# Patient Record
Sex: MALE | Race: WHITE | Employment: OTHER | URBAN - METROPOLITAN AREA
[De-identification: names, ages, dates, MRNs, and addresses within clinical notes are randomized per-mention and may not be internally consistent; named-entity substitution may affect disease eponyms.]

---

## 2017-04-04 ENCOUNTER — TELEPHONE (OUTPATIENT)
Dept: FAMILY MEDICINE CLINIC | Facility: CLINIC | Age: 67
End: 2017-04-04

## 2017-04-04 DIAGNOSIS — E29.1 MALE HYPOGONADISM: ICD-10-CM

## 2017-04-04 DIAGNOSIS — E87.6 HYPOKALEMIA: Primary | ICD-10-CM

## 2017-04-04 NOTE — TELEPHONE ENCOUNTER
Pt would like for Dr. Dorita Caban to placed required labs into the system. Pt must have labs done before his hormone levels can be checked. Pt would like to have these labs done before Friday please. Would like RN call back to confirm orders are ready.

## 2017-04-05 NOTE — TELEPHONE ENCOUNTER
JWVANI can you advise on message below? Pt wants to get labs drawn to check his hormone levels. Pt is not scheduled for an up coming appointment. LOV 09/08/2016. Would you like pt to follow up with you before placing labs?

## 2017-04-25 ENCOUNTER — APPOINTMENT (OUTPATIENT)
Dept: LAB | Age: 67
End: 2017-04-25
Attending: FAMILY MEDICINE
Payer: COMMERCIAL

## 2017-04-25 DIAGNOSIS — E29.1 MALE HYPOGONADISM: ICD-10-CM

## 2017-04-25 DIAGNOSIS — E87.6 HYPOKALEMIA: ICD-10-CM

## 2017-04-25 PROCEDURE — 80048 BASIC METABOLIC PNL TOTAL CA: CPT

## 2017-04-25 PROCEDURE — 36415 COLL VENOUS BLD VENIPUNCTURE: CPT

## 2017-04-25 PROCEDURE — 84403 ASSAY OF TOTAL TESTOSTERONE: CPT

## 2017-04-25 PROCEDURE — 84402 ASSAY OF FREE TESTOSTERONE: CPT

## 2017-04-27 ENCOUNTER — TELEPHONE (OUTPATIENT)
Dept: FAMILY MEDICINE CLINIC | Facility: CLINIC | Age: 67
End: 2017-04-27

## 2017-04-27 DIAGNOSIS — E87.6 HYPOKALEMIA: ICD-10-CM

## 2017-04-27 DIAGNOSIS — R73.9 HYPERGLYCEMIA: Primary | ICD-10-CM

## 2017-04-27 RX ORDER — POTASSIUM CHLORIDE 1500 MG/1
20 TABLET, FILM COATED, EXTENDED RELEASE ORAL DAILY
Qty: 30 TABLET | Refills: 5 | Status: SHIPPED | OUTPATIENT
Start: 2017-04-27 | End: 2019-01-26

## 2017-04-28 NOTE — TELEPHONE ENCOUNTER
Spoke with pt informed test results states he will  K-Dur. Made appt for annual physical on 05/09/17. BMP and HgbA1c tests ordered to be completed in one month. Pt verbalized understanding.

## 2017-04-28 NOTE — TELEPHONE ENCOUNTER
----- Message from Gregg Bates MD sent at 4/27/2017  9:18 AM CDT -----  Labs: potassium remains low at 3.1, should be 3.3 or higher and had been 3.2 8 mo ago.  He needs to be on a potassium supplement and I will send script to his pharmacy for K-Dur 2

## 2017-04-28 NOTE — TELEPHONE ENCOUNTER
[facility] [phone] today, then [phone] afterward. Thank you.

## 2017-05-09 ENCOUNTER — OFFICE VISIT (OUTPATIENT)
Dept: FAMILY MEDICINE CLINIC | Facility: CLINIC | Age: 67
End: 2017-05-09

## 2017-05-09 VITALS
TEMPERATURE: 99 F | DIASTOLIC BLOOD PRESSURE: 90 MMHG | HEIGHT: 66.5 IN | WEIGHT: 256 LBS | SYSTOLIC BLOOD PRESSURE: 154 MMHG | RESPIRATION RATE: 16 BRPM | HEART RATE: 88 BPM | BODY MASS INDEX: 40.66 KG/M2

## 2017-05-09 DIAGNOSIS — E78.00 PURE HYPERCHOLESTEROLEMIA: ICD-10-CM

## 2017-05-09 DIAGNOSIS — Z12.11 COLON CANCER SCREENING: ICD-10-CM

## 2017-05-09 DIAGNOSIS — I87.2 VENOUS INSUFFICIENCY (CHRONIC) (PERIPHERAL): ICD-10-CM

## 2017-05-09 DIAGNOSIS — I10 HTN (HYPERTENSION), BENIGN: ICD-10-CM

## 2017-05-09 DIAGNOSIS — K21.9 GASTROESOPHAGEAL REFLUX DISEASE, ESOPHAGITIS PRESENCE NOT SPECIFIED: ICD-10-CM

## 2017-05-09 DIAGNOSIS — H91.92 HEARING LOSS OF LEFT EAR, UNSPECIFIED HEARING LOSS TYPE: ICD-10-CM

## 2017-05-09 DIAGNOSIS — Z86.010 HISTORY OF COLON POLYPS: ICD-10-CM

## 2017-05-09 DIAGNOSIS — E29.1 MALE HYPOGONADISM: ICD-10-CM

## 2017-05-09 DIAGNOSIS — E66.9 OBESITY (BMI 35.0-39.9 WITHOUT COMORBIDITY): ICD-10-CM

## 2017-05-09 DIAGNOSIS — Z00.00 ANNUAL PHYSICAL EXAM: Primary | ICD-10-CM

## 2017-05-09 DIAGNOSIS — E87.6 HYPOKALEMIA: ICD-10-CM

## 2017-05-09 DIAGNOSIS — I10 ESSENTIAL HYPERTENSION: ICD-10-CM

## 2017-05-09 PROBLEM — Z86.0100 HISTORY OF COLON POLYPS: Status: ACTIVE | Noted: 2017-05-09

## 2017-05-09 PROCEDURE — 99397 PER PM REEVAL EST PAT 65+ YR: CPT | Performed by: FAMILY MEDICINE

## 2017-05-09 RX ORDER — AMLODIPINE BESYLATE 5 MG/1
TABLET ORAL
Qty: 180 TABLET | Refills: 3 | Status: SHIPPED | OUTPATIENT
Start: 2017-05-09 | End: 2018-02-20

## 2017-05-09 RX ORDER — TESTOSTERONE CYPIONATE 200 MG/ML
200 INJECTION INTRAMUSCULAR
Qty: 10 ML | Refills: 3 | Status: SHIPPED
Start: 2017-05-09 | End: 2017-07-25

## 2017-05-09 RX ORDER — HYDROCODONE BITARTRATE AND ACETAMINOPHEN 5; 325 MG/1; MG/1
1 TABLET ORAL EVERY 6 HOURS PRN
Qty: 40 TABLET | Refills: 0 | Status: SHIPPED | OUTPATIENT
Start: 2017-05-09 | End: 2017-11-21

## 2017-05-09 RX ORDER — VALSARTAN AND HYDROCHLOROTHIAZIDE 320; 25 MG/1; MG/1
1 TABLET, FILM COATED ORAL DAILY
Qty: 90 TABLET | Refills: 3 | Status: SHIPPED | OUTPATIENT
Start: 2017-05-09 | End: 2018-02-20

## 2017-05-09 RX ORDER — DIAZEPAM 5 MG/1
TABLET ORAL
Qty: 30 TABLET | Refills: 0 | Status: SHIPPED
Start: 2017-05-09 | End: 2018-02-20

## 2017-05-09 RX ORDER — METOPROLOL SUCCINATE 50 MG/1
50 TABLET, EXTENDED RELEASE ORAL
Qty: 90 TABLET | Refills: 3 | Status: SHIPPED | OUTPATIENT
Start: 2017-05-09 | End: 2018-02-20

## 2017-05-09 RX ORDER — OMEPRAZOLE 40 MG/1
40 CAPSULE, DELAYED RELEASE ORAL DAILY
Qty: 90 CAPSULE | Refills: 3 | Status: SHIPPED | OUTPATIENT
Start: 2017-05-09 | End: 2018-02-20

## 2017-05-09 RX ORDER — SIMVASTATIN 40 MG
TABLET ORAL
Qty: 90 TABLET | Refills: 3 | Status: SHIPPED | OUTPATIENT
Start: 2017-05-09 | End: 2018-02-20

## 2017-05-09 NOTE — PROGRESS NOTES
HPI:    Patient ID: Eward Ahumada is a 75year old male.   Patient presents with:  Physical  Medication Request  Patient presents with:  Physical  Medication Request    HPI  Hx allergic rhinitis, HLD, HTN, obesity, male hypogonadism, GERD, elevated BS, joe for next colonoscopy   Genitourinary: Positive for frequency and nocturia. Hx male hypogonadism, would like to resume testosterone injections  Nocturia x 1. Allergic/Immunologic: Positive for environmental allergies.    Neurological: Positive for t Rfl:    Calcium & Magnesium Carbonates 311-232 MG Oral Tab Take  by mouth. calcium 500 mg tablet Disp:  Rfl:    testosterone cypionate (DEPOTESTOTERONE) 200 MG/ML Intramuscular Oil inject 2 milliliter (400 MG)  by intramuscular route  every 2 weeks Disp: 1 (hypertension), benign  Venous insufficiency (chronic) (peripheral)  History of colon polyps  Hearing loss of left ear, unspecified hearing loss type  1. Annual physical exam  Labs already done and reviewed    2.  Essential hypertension  BP high today , but

## 2017-05-10 ENCOUNTER — TELEPHONE (OUTPATIENT)
Dept: FAMILY MEDICINE CLINIC | Facility: CLINIC | Age: 67
End: 2017-05-10

## 2017-05-10 NOTE — TELEPHONE ENCOUNTER
Medical Clarification Request received from [facility] for simvastatin and diazepam. Forms completed and signed by WERO, faxed back to [phone]. Forms sent for scanning.

## 2017-05-15 ENCOUNTER — TELEPHONE (OUTPATIENT)
Dept: FAMILY MEDICINE CLINIC | Facility: CLINIC | Age: 67
End: 2017-05-15

## 2017-05-15 NOTE — TELEPHONE ENCOUNTER
Medical Clarification request for testosterone from [facility], signed and faxed back to [phone].

## 2017-05-15 NOTE — TELEPHONE ENCOUNTER
Pt would like a refill on his testosterone medication. [facility], (listed) Pt is currently out of the mediation and states that doctor was going to send it to the pharmacy at the last appointment visit. Please, call pt with any questions.

## 2017-05-19 RX ORDER — TESTOSTERONE CYPIONATE 200 MG/ML
200 INJECTION INTRAMUSCULAR
Qty: 10 ML | Refills: 3 | Status: CANCELLED | OUTPATIENT
Start: 2017-05-19

## 2017-07-24 ENCOUNTER — TELEPHONE (OUTPATIENT)
Dept: FAMILY MEDICINE CLINIC | Facility: CLINIC | Age: 67
End: 2017-07-24

## 2017-07-24 NOTE — TELEPHONE ENCOUNTER
Genet Miguel needs a refill of:    •  Testosterone cypionate 200 MG/ML Intramuscular Solution, Inject 1 mL (200 mg total) into the muscle every 14 (fourteen) days. , Disp: 10 mL, Rfl: 3

## 2017-07-25 RX ORDER — TESTOSTERONE CYPIONATE 200 MG/ML
200 INJECTION INTRAMUSCULAR
Qty: 10 ML | Refills: 0 | Status: SHIPPED | OUTPATIENT
Start: 2017-07-25 | End: 2017-11-06

## 2017-07-25 NOTE — TELEPHONE ENCOUNTER
One refill approved. Please call to pharmacy. Patient needs to schedule office visit to establish care with new PCP for further refills.

## 2017-07-26 NOTE — TELEPHONE ENCOUNTER
Rx called into [facility] w/ pharmacist. Pt notified for further refills will need to establish with new pcp, Pt understands.

## 2017-10-19 ENCOUNTER — TELEPHONE (OUTPATIENT)
Dept: INTERNAL MEDICINE CLINIC | Facility: CLINIC | Age: 67
End: 2017-10-19

## 2017-11-02 NOTE — TELEPHONE ENCOUNTER
Called [facility] for status of prior auth for testosterone injections. Complete information given to pa rep. Decision in 4 days.

## 2017-11-06 RX ORDER — TESTOSTERONE CYPIONATE 200 MG/ML
200 INJECTION INTRAMUSCULAR
Qty: 10 ML | Refills: 0 | OUTPATIENT
Start: 2017-11-06 | End: 2018-02-20

## 2017-11-06 NOTE — TELEPHONE ENCOUNTER
Pt is requesting refill. Pt has appt on 11/21      Testosterone cypionate 200 MG/ML Intramuscular Solution Inject 1 mL (200 mg total) into the muscle every 14 (fourteen) days.  Disp: 10 mL Rfl: 0

## 2017-11-06 NOTE — TELEPHONE ENCOUNTER
Pt called in wanting to switch pharmacy to [facility] in [facility], chart has been updated. Please send medication to [facility] AND [facility] on file if approved.

## 2017-11-06 NOTE — TELEPHONE ENCOUNTER
LOV: 5/9/17 with DR. Glaser and has appt scheduled on 11/21 , LR: 7/25/17, script pended. Please advise.

## 2017-11-07 NOTE — TELEPHONE ENCOUNTER
Fax received from [facility] stating testosterone is approved for 6 months through 5/2/18. Pt contacted, stated he received 6 (1ml) vials from [employer].  Informed pt the rx is for 10 vials so he should contact [facility] when he needs additional vials

## 2017-11-21 ENCOUNTER — APPOINTMENT (OUTPATIENT)
Dept: LAB | Age: 67
End: 2017-11-21
Attending: FAMILY MEDICINE
Payer: COMMERCIAL

## 2017-11-21 ENCOUNTER — OFFICE VISIT (OUTPATIENT)
Dept: FAMILY MEDICINE CLINIC | Facility: CLINIC | Age: 67
End: 2017-11-21

## 2017-11-21 VITALS
WEIGHT: 251 LBS | BODY MASS INDEX: 40 KG/M2 | DIASTOLIC BLOOD PRESSURE: 80 MMHG | TEMPERATURE: 99 F | HEART RATE: 88 BPM | SYSTOLIC BLOOD PRESSURE: 150 MMHG

## 2017-11-21 DIAGNOSIS — Q80.0 ICHTHYOSIS VULGARIS: ICD-10-CM

## 2017-11-21 DIAGNOSIS — E87.6 HYPOKALEMIA: ICD-10-CM

## 2017-11-21 DIAGNOSIS — E78.5 HYPERLIPIDEMIA, UNSPECIFIED HYPERLIPIDEMIA TYPE: ICD-10-CM

## 2017-11-21 DIAGNOSIS — R73.9 HYPERGLYCEMIA: Primary | ICD-10-CM

## 2017-11-21 DIAGNOSIS — M54.5 CHRONIC BILATERAL LOW BACK PAIN, WITH SCIATICA PRESENCE UNSPECIFIED: ICD-10-CM

## 2017-11-21 DIAGNOSIS — R73.9 HYPERGLYCEMIA: ICD-10-CM

## 2017-11-21 DIAGNOSIS — N52.9 ERECTILE DYSFUNCTION, UNSPECIFIED ERECTILE DYSFUNCTION TYPE: ICD-10-CM

## 2017-11-21 DIAGNOSIS — G89.29 CHRONIC BILATERAL LOW BACK PAIN, WITH SCIATICA PRESENCE UNSPECIFIED: ICD-10-CM

## 2017-11-21 DIAGNOSIS — I10 ESSENTIAL HYPERTENSION: ICD-10-CM

## 2017-11-21 PROCEDURE — 99212 OFFICE O/P EST SF 10 MIN: CPT | Performed by: FAMILY MEDICINE

## 2017-11-21 PROCEDURE — 99214 OFFICE O/P EST MOD 30 MIN: CPT | Performed by: FAMILY MEDICINE

## 2017-11-21 PROCEDURE — 80048 BASIC METABOLIC PNL TOTAL CA: CPT

## 2017-11-21 PROCEDURE — 36415 COLL VENOUS BLD VENIPUNCTURE: CPT

## 2017-11-21 PROCEDURE — 83036 HEMOGLOBIN GLYCOSYLATED A1C: CPT

## 2017-11-21 RX ORDER — AMMONIUM LACTATE 12 G/100G
1 LOTION TOPICAL AS NEEDED
Qty: 1 BOTTLE | Refills: 0 | Status: SHIPPED | OUTPATIENT
Start: 2017-11-21 | End: 2017-11-21

## 2017-11-21 RX ORDER — AMMONIUM LACTATE 12 G/100G
1 LOTION TOPICAL AS NEEDED
Qty: 1 BOTTLE | Refills: 0 | Status: SHIPPED | OUTPATIENT
Start: 2017-11-21 | End: 2017-11-29

## 2017-11-21 RX ORDER — METHOCARBAMOL 500 MG/1
500 TABLET, FILM COATED ORAL 4 TIMES DAILY
Qty: 120 TABLET | Refills: 0 | Status: ON HOLD | OUTPATIENT
Start: 2017-11-21 | End: 2021-02-08

## 2017-11-21 NOTE — PATIENT INSTRUCTIONS
Monitor blood pressures and record at home. Limit salt intake. Recommend weight loss via daily exercising and consistent healthy dietary changes. Comply with medications. Medication reviewed and renewed where needed and appropriate.   Lac Hydrin 12 lotio

## 2017-11-22 ENCOUNTER — TELEPHONE (OUTPATIENT)
Dept: FAMILY MEDICINE CLINIC | Facility: CLINIC | Age: 67
End: 2017-11-22

## 2017-11-22 DIAGNOSIS — Q80.0 ICHTHYOSIS VULGARIS: ICD-10-CM

## 2017-11-22 NOTE — TELEPHONE ENCOUNTER
Pharm is calling state that they don't mix medication they received a prescription Tri-Mix Standard (PPP) Injection Solution

## 2017-11-24 NOTE — TELEPHONE ENCOUNTER
Please note/advise on pharmacy message below. Thank you.     Please respond to pool: MAHENDRA FM OPO LPN/HOLLY

## 2017-11-27 NOTE — TELEPHONE ENCOUNTER
Please contact patient and have him state specifically what he wants or tell him to check with his resource and let us know where his resource is getting this prescription from.

## 2017-11-27 NOTE — TELEPHONE ENCOUNTER
Noted Returned call and spoke with patient. She stated that she is having some major back pain that worsens when she bends over of strains and wanted to be worked in this afternoon. Informed patient that provider not her this afternoon and advised of urgent care. Patient stated that she may just wait till her appt Monday but if not she will def go to the urgent care when she gets off work today.

## 2017-11-27 NOTE — TELEPHONE ENCOUNTER
Reviewed pharm message and doctor's message with pt. Pt states he had talked to someone about this med several years ago. Pt stated put med on hold at this time.

## 2017-11-29 RX ORDER — AMMONIUM LACTATE 12 G/100G
1 LOTION TOPICAL 2 TIMES DAILY
Qty: 1 BOTTLE | Refills: 0 | Status: ON HOLD
Start: 2017-11-29 | End: 2021-02-08

## 2017-11-29 NOTE — TELEPHONE ENCOUNTER
Reviewed updated Ammonium Lactate 12% instructions with pharmacist, nothing further needed at this time.

## 2017-11-29 NOTE — PROGRESS NOTES
HPI:    Patient ID: Manish Diallo is a 75year old male. 75year old CM here for follow up on low potassium and for borderline blood sugar. Hypertension   This is a chronic problem. The current episode started more than 1 year ago.  The problem is methocarbamol 500 MG Oral Tab Take 1 tablet (500 mg total) by mouth 4 (four) times daily. Disp: 120 tablet Rfl: 0   ammonium lactate 12 % External Lotion Apply 1 Application topically as needed for Dry Skin.  Disp: 1 Bottle Rfl: 0   Tri-Mix Standard (PPP) I VIAGRA 100 MG Oral Tab TAKE 1 TABLET AS NEEDED DAILY 1 HOUR BEFORE SEXUAL ACTIVITY Disp: 15 tablet Rfl: 2   testosterone cypionate (DEPOTESTOTERONE) 200 MG/ML Intramuscular Oil inject 2 milliliter (400 MG)  by intramuscular route  every 2 weeks Disp: 10 mL Medication reviewed and renewed where needed and appropriate. - methocarbamol 500 MG Oral Tab; Take 1 tablet (500 mg total) by mouth 4 (four) times daily. Dispense: 120 tablet; Refill: 0    6.  Ichthyosis vulgaris  Prescribed  - ammonium lactate 12 % Exte

## 2017-12-07 ENCOUNTER — TELEPHONE (OUTPATIENT)
Dept: FAMILY MEDICINE CLINIC | Facility: CLINIC | Age: 67
End: 2017-12-07

## 2017-12-07 NOTE — TELEPHONE ENCOUNTER
----- Message from Stoney Egan DO sent at 11/29/2017 11:40 AM CST -----  A1c does not support diagnosis of diabetes, however it appears that he may be heading in that direction. Recommend dietary. See if patient is agreeable.  Also potassium is cons

## 2017-12-08 ENCOUNTER — OFFICE VISIT (OUTPATIENT)
Dept: FAMILY MEDICINE CLINIC | Facility: CLINIC | Age: 67
End: 2017-12-08

## 2017-12-08 ENCOUNTER — NURSE TRIAGE (OUTPATIENT)
Dept: FAMILY MEDICINE CLINIC | Facility: CLINIC | Age: 67
End: 2017-12-08

## 2017-12-08 VITALS
WEIGHT: 252.19 LBS | BODY MASS INDEX: 40.05 KG/M2 | TEMPERATURE: 99 F | HEART RATE: 96 BPM | RESPIRATION RATE: 18 BRPM | HEIGHT: 66.5 IN | DIASTOLIC BLOOD PRESSURE: 90 MMHG | SYSTOLIC BLOOD PRESSURE: 148 MMHG

## 2017-12-08 DIAGNOSIS — R04.0 EPISTAXIS: ICD-10-CM

## 2017-12-08 DIAGNOSIS — R04.2 COUGH WITH HEMOPTYSIS: ICD-10-CM

## 2017-12-08 DIAGNOSIS — J06.9 UPPER RESPIRATORY TRACT INFECTION, UNSPECIFIED TYPE: Primary | ICD-10-CM

## 2017-12-08 PROCEDURE — 99213 OFFICE O/P EST LOW 20 MIN: CPT | Performed by: FAMILY MEDICINE

## 2017-12-08 PROCEDURE — 99212 OFFICE O/P EST SF 10 MIN: CPT | Performed by: FAMILY MEDICINE

## 2017-12-08 NOTE — PROGRESS NOTES
HPI:    Patient ID: Priyanka Bhatt is a 75year old male. Cough   This is a new problem. The current episode started in the past 7 days. The problem has been waxing and waning. The cough is productive of sputum.  Associated symptoms include hemoptysis, n daily. Disp: 90 tablet Rfl: 3   Levocetirizine Dihydrochloride 5 MG Oral Tab Take 1 tablet (5 mg total) by mouth every evening.  Disp: 90 tablet Rfl: 3   BD LUER-KIM SYRINGE 22G X 1-1/2\" 3 ML Does not apply Misc USE 2X A MONTH FOR SELF INJECTION WITH TESTO antibiotic for sinusitis. No orders of the defined types were placed in this encounter.       Meds This Visit:  No prescriptions requested or ordered in this encounter    Imaging & Referrals:  None       PO#[id number]

## 2017-12-08 NOTE — TELEPHONE ENCOUNTER
Action Requested: Summary for Provider     []  Critical Lab, Recommendations Needed  [] Need Additional Advice  []   FYI    []   Need Orders  [] Need Medications Sent to Pharmacy  []  Other     SUMMARY: OV CREATED, cough, congestion, blood tinged sputum

## 2017-12-08 NOTE — TELEPHONE ENCOUNTER
Patient calling for acute encounter - advised pt of Dr Roth's note. Patient verbalized understanding and had no further questions.

## 2018-02-20 ENCOUNTER — OFFICE VISIT (OUTPATIENT)
Dept: FAMILY MEDICINE CLINIC | Facility: CLINIC | Age: 68
End: 2018-02-20

## 2018-02-20 VITALS — WEIGHT: 252 LBS | DIASTOLIC BLOOD PRESSURE: 90 MMHG | BODY MASS INDEX: 40 KG/M2 | SYSTOLIC BLOOD PRESSURE: 160 MMHG

## 2018-02-20 DIAGNOSIS — F41.9 ANXIETY: ICD-10-CM

## 2018-02-20 DIAGNOSIS — E29.1 HYPOTESTOSTERONEMIA IN MALE: ICD-10-CM

## 2018-02-20 DIAGNOSIS — I10 ESSENTIAL HYPERTENSION: Primary | ICD-10-CM

## 2018-02-20 DIAGNOSIS — N52.9 ERECTILE DYSFUNCTION, UNSPECIFIED ERECTILE DYSFUNCTION TYPE: ICD-10-CM

## 2018-02-20 DIAGNOSIS — E66.9 OBESITY (BMI 35.0-39.9 WITHOUT COMORBIDITY): ICD-10-CM

## 2018-02-20 DIAGNOSIS — E78.00 HYPERCHOLESTEROLEMIA: ICD-10-CM

## 2018-02-20 DIAGNOSIS — K21.9 GASTROESOPHAGEAL REFLUX DISEASE, ESOPHAGITIS PRESENCE NOT SPECIFIED: ICD-10-CM

## 2018-02-20 PROCEDURE — 99214 OFFICE O/P EST MOD 30 MIN: CPT | Performed by: FAMILY MEDICINE

## 2018-02-20 PROCEDURE — G0463 HOSPITAL OUTPT CLINIC VISIT: HCPCS | Performed by: FAMILY MEDICINE

## 2018-02-20 RX ORDER — METOPROLOL SUCCINATE 50 MG/1
50 TABLET, EXTENDED RELEASE ORAL
Qty: 90 TABLET | Refills: 3 | Status: SHIPPED | OUTPATIENT
Start: 2018-02-20 | End: 2018-06-14

## 2018-02-20 RX ORDER — SIMVASTATIN 40 MG
TABLET ORAL
Qty: 90 TABLET | Refills: 3 | Status: SHIPPED | OUTPATIENT
Start: 2018-02-20 | End: 2021-01-28 | Stop reason: DRUGHIGH

## 2018-02-20 RX ORDER — OMEPRAZOLE 40 MG/1
40 CAPSULE, DELAYED RELEASE ORAL DAILY
Qty: 90 CAPSULE | Refills: 3 | Status: SHIPPED | OUTPATIENT
Start: 2018-02-20 | End: 2019-02-22

## 2018-02-20 RX ORDER — AMLODIPINE BESYLATE 5 MG/1
TABLET ORAL
Qty: 180 TABLET | Refills: 3 | Status: SHIPPED | OUTPATIENT
Start: 2018-02-20 | End: 2018-06-14

## 2018-02-20 RX ORDER — DIAZEPAM 5 MG/1
TABLET ORAL
Qty: 30 TABLET | Refills: 0 | Status: SHIPPED | OUTPATIENT
Start: 2018-02-20 | End: 2018-11-08

## 2018-02-20 RX ORDER — TESTOSTERONE CYPIONATE 200 MG/ML
200 INJECTION INTRAMUSCULAR
Qty: 6 VIAL | Refills: 3 | Status: SHIPPED | OUTPATIENT
Start: 2018-02-20 | End: 2018-03-02

## 2018-02-20 RX ORDER — VALSARTAN AND HYDROCHLOROTHIAZIDE 320; 25 MG/1; MG/1
1 TABLET, FILM COATED ORAL DAILY
Qty: 90 TABLET | Refills: 3 | Status: SHIPPED | OUTPATIENT
Start: 2018-02-20 | End: 2018-06-27

## 2018-02-20 NOTE — PATIENT INSTRUCTIONS
Medication reviewed and renewed where needed and appropriate. Comply with medications. Recommend weight loss via daily exercising and consistent healthy dietary changes. Monitor blood pressures and record at home. Limit salt intake.

## 2018-02-23 ENCOUNTER — TELEPHONE (OUTPATIENT)
Dept: FAMILY MEDICINE CLINIC | Facility: CLINIC | Age: 68
End: 2018-02-23

## 2018-02-23 RX ORDER — SIMVASTATIN 20 MG
20 TABLET ORAL NIGHTLY
Qty: 90 TABLET | Refills: 1 | Status: SHIPPED
Start: 2018-02-23 | End: 2018-06-17

## 2018-02-23 NOTE — TELEPHONE ENCOUNTER
Per pharmacy on file they have question on rx med diazepam 5 MG Oral Tab sig, Ref# [id number]. Pls advise. ,

## 2018-02-23 NOTE — TELEPHONE ENCOUNTER
Disregard note below. Maximus Dalton from [facility] called indicated that per FDA recommended guidelines maximum dosage of 20mg is recommended for simvastatin when combined with amlodipine.  Wanted to know if still ok to dispense the simvastatin 40mg or if you wanted t

## 2018-02-28 NOTE — PROGRESS NOTES
HPI:    Patient ID: Rose Tinajero is a 75year old male. Hypertension   This is a chronic problem. The current episode started more than 1 year ago. The problem has been waxing and waning since onset. The problem is uncontrolled.  Associated symptoms in Prescriptions:  Testosterone cypionate 200 MG/ML Intramuscular Solution Inject 1 mL (200 mg total) into the muscle every 14 (fourteen) days.  Disp: 6 vial Rfl: 3   Syringe/Needle, Disp, (BD LUER-KIM SYRINGE) 22G X 1-1/2\" 3 ML Does not apply Misc USE 2X A M Oral Tab Take 1 tablet (20 mg total) by mouth nightly. Disp: 90 tablet Rfl: 1   Levocetirizine Dihydrochloride 5 MG Oral Tab Take 1 tablet (5 mg total) by mouth every evening.  Disp: 90 tablet Rfl: 3     Allergies:  Seasonal                     02/20/18  15 male  Prescribed  - Testosterone cypionate 200 MG/ML Intramuscular Solution; Inject 1 mL (200 mg total) into the muscle every 14 (fourteen) days.   Dispense: 6 vial; Refill: 3  - Syringe/Needle, Disp, (BD LUER-KIM SYRINGE) 22G X 1-1/2\" 3 ML Does not apply 0.6 mg/ ml      simvastatin 40 MG Oral Tab 90 tablet 3      Sig: TAKE 1 TABLET EVERY EVENING      Omeprazole 40 MG Oral Capsule Delayed Release 90 capsule 3      Sig: Take 1 capsule (40 mg total) by mouth daily.       Metoprolol Succinate ER 50 MG Oral Tabl

## 2018-03-02 ENCOUNTER — TELEPHONE (OUTPATIENT)
Dept: OTHER | Age: 68
End: 2018-03-02

## 2018-03-02 DIAGNOSIS — E29.1 HYPOTESTOSTERONEMIA IN MALE: ICD-10-CM

## 2018-03-02 RX ORDER — TESTOSTERONE CYPIONATE 200 MG/ML
200 INJECTION INTRAMUSCULAR
Qty: 6 VIAL | Refills: 3 | Status: SHIPPED | OUTPATIENT
Start: 2018-03-02 | End: 2018-11-08

## 2018-03-02 NOTE — TELEPHONE ENCOUNTER
Harshad Sainz from [employer] called to verify prescriptions for Testosterone, needles, and syringes was faxed from the [address] [place] [address] office.  States prescriptions were received with a blank fax cover sheet, no indication prescriptions were faxed from the office or by th

## 2018-05-09 ENCOUNTER — PATIENT OUTREACH (OUTPATIENT)
Dept: CASE MANAGEMENT | Age: 68
End: 2018-05-09

## 2018-05-09 NOTE — PROGRESS NOTES
Outreached to patient in regards to our Chronic Care Management program. Patient stated he has a lot going on right now and to check back with him within one month.  I informed patient I will mail out letter and follow up within one month and Patient agreed

## 2018-06-11 ENCOUNTER — TELEPHONE (OUTPATIENT)
Dept: FAMILY MEDICINE CLINIC | Facility: CLINIC | Age: 68
End: 2018-06-11

## 2018-06-11 DIAGNOSIS — I10 ESSENTIAL HYPERTENSION: ICD-10-CM

## 2018-06-11 RX ORDER — AMLODIPINE BESYLATE 5 MG/1
TABLET ORAL
Qty: 180 TABLET | Refills: 3 | OUTPATIENT
Start: 2018-06-11

## 2018-06-11 RX ORDER — VALSARTAN AND HYDROCHLOROTHIAZIDE 320; 25 MG/1; MG/1
1 TABLET, FILM COATED ORAL DAILY
Qty: 90 TABLET | Refills: 3 | OUTPATIENT
Start: 2018-06-11

## 2018-06-11 NOTE — TELEPHONE ENCOUNTER
Mariola Rdz needs a refill of:      •  Valsartan-Hydrochlorothiazide 320-25 MG Oral Tab, Take 1 tablet by mouth daily. , Disp: 90 tablet, Rfl: 3    •  AmLODIPine Besylate 5 MG Oral Tab, TAKE 1 TABLET TWICE DAILY, Disp: 180 tablet, Rfl: 3

## 2018-06-12 NOTE — PROGRESS NOTES
Outreached to patient in regards to letter mailed for enrollment to Chronic Care Management program. Left message for patient to return my call at ext. [phone]. Thank you.

## 2018-06-13 ENCOUNTER — TELEPHONE (OUTPATIENT)
Dept: INTERNAL MEDICINE CLINIC | Facility: CLINIC | Age: 68
End: 2018-06-13

## 2018-06-13 NOTE — TELEPHONE ENCOUNTER
Current Outpatient Prescriptions:   •  Testosterone cypionate 200 MG/ML Intramuscular Solution, Inject 1 mL (200 mg total) into the muscle every 14 (fourteen) days. , Disp: 6 vial, Rfl: 3

## 2018-06-14 DIAGNOSIS — I10 ESSENTIAL HYPERTENSION: ICD-10-CM

## 2018-06-14 RX ORDER — AMLODIPINE BESYLATE 5 MG/1
TABLET ORAL
Qty: 180 TABLET | Refills: 3 | Status: SHIPPED | OUTPATIENT
Start: 2018-06-14 | End: 2019-04-12

## 2018-06-14 RX ORDER — METOPROLOL SUCCINATE 50 MG/1
50 TABLET, EXTENDED RELEASE ORAL
Qty: 90 TABLET | Refills: 3 | Status: SHIPPED | OUTPATIENT
Start: 2018-06-14 | End: 2019-05-20

## 2018-06-14 NOTE — TELEPHONE ENCOUNTER
Patient received script on 03/02/18 for a one year supply. Contacted patient and he stated he does not need a refill for his testosterone. States he needs a refill for metoprolol and amlodipine.

## 2018-06-14 NOTE — TELEPHONE ENCOUNTER
Please advise on refill request.     Hypertensive Medications  Protocol Criteria:  · Appointment scheduled in the past 6 months or in the next 3 months  · BMP or CMP in the past 12 months  · Creatinine result < 2  Recent Outpatient Visits            3 adrianna

## 2018-06-18 RX ORDER — SIMVASTATIN 20 MG
TABLET ORAL
Qty: 90 TABLET | Refills: 1 | Status: SHIPPED | OUTPATIENT
Start: 2018-06-18 | End: 2018-12-07

## 2018-06-27 DIAGNOSIS — I10 ESSENTIAL HYPERTENSION: ICD-10-CM

## 2018-06-27 RX ORDER — VALSARTAN AND HYDROCHLOROTHIAZIDE 320; 25 MG/1; MG/1
1 TABLET, FILM COATED ORAL DAILY
Qty: 7 TABLET | Refills: 0 | Status: SHIPPED | OUTPATIENT
Start: 2018-06-27 | End: 2019-04-12

## 2018-09-04 DIAGNOSIS — Q80.0 ICHTHYOSIS VULGARIS: ICD-10-CM

## 2018-09-05 RX ORDER — AMMONIUM LACTATE 12 G/100G
LOTION TOPICAL
Qty: 225 G | Refills: 0 | Status: SHIPPED | OUTPATIENT
Start: 2018-09-05 | End: 2019-05-20

## 2018-09-05 NOTE — TELEPHONE ENCOUNTER
LOV: 2-20-18 Last Rx: 11-29-17    No protocol     Please advise in regards to refill request. Thank You

## 2018-11-01 ENCOUNTER — TELEPHONE (OUTPATIENT)
Dept: FAMILY MEDICINE CLINIC | Facility: CLINIC | Age: 68
End: 2018-11-01

## 2018-11-01 DIAGNOSIS — E29.1 HYPOTESTOSTERONEMIA IN MALE: ICD-10-CM

## 2018-11-01 NOTE — TELEPHONE ENCOUNTER
[facility] called, testosterone refill, sending to fax #[phone], gave them correct fax # for pharmacies for [facility] [phone] (verified twice), advised to send fax for refill and person agreed. Reference # [id number].

## 2018-11-08 DIAGNOSIS — E29.1 HYPOTESTOSTERONEMIA IN MALE: ICD-10-CM

## 2018-11-08 DIAGNOSIS — F41.9 ANXIETY: ICD-10-CM

## 2018-11-08 RX ORDER — TESTOSTERONE CYPIONATE 200 MG/ML
200 INJECTION INTRAMUSCULAR
Qty: 6 VIAL | Refills: 3 | Status: CANCELLED
Start: 2018-11-08

## 2018-11-10 NOTE — TELEPHONE ENCOUNTER
Review pended refill request as it does not fall under a protocol.     Last Rx: 3/2/18  LOV: 2/20/18

## 2018-11-12 RX ORDER — DIAZEPAM 5 MG/1
TABLET ORAL
Qty: 30 TABLET | Refills: 2 | Status: SHIPPED
Start: 2018-11-12 | End: 2021-05-28

## 2018-11-12 RX ORDER — TESTOSTERONE CYPIONATE 200 MG/ML
200 INJECTION INTRAMUSCULAR
Qty: 6 VIAL | Refills: 3 | Status: SHIPPED
Start: 2018-11-12 | End: 2019-05-23

## 2018-11-14 ENCOUNTER — PATIENT MESSAGE (OUTPATIENT)
Dept: FAMILY MEDICINE CLINIC | Facility: CLINIC | Age: 68
End: 2018-11-14

## 2018-11-15 ENCOUNTER — TELEPHONE (OUTPATIENT)
Dept: FAMILY MEDICINE CLINIC | Facility: CLINIC | Age: 68
End: 2018-11-15

## 2018-11-15 NOTE — TELEPHONE ENCOUNTER
Dr. Kankia Johnson:     Please advise on frequency of Diazepam, script currently states to take 1/2 tab as needed.

## 2018-11-15 NOTE — TELEPHONE ENCOUNTER
[facility] called. Need to know the frequency as how Pt Should take the   Diazepam 5 mg.     Contact them at [phone]    Reference # [id number]

## 2018-11-15 NOTE — TELEPHONE ENCOUNTER
From: Jolene Sainz  To: Wilfrido Lewis DO  Sent: 11/14/2018 9:52 AM CST  Subject: Prescription Question    Dr Springer Braxton,  I regret to inform you that 2 of the medications that you approved for refill [facility] did not receive.  The two meds not receiv

## 2018-11-15 NOTE — TELEPHONE ENCOUNTER
Contacted patient to clarify how he takes diazepam.   Per patient, he takes diazepam 1/2 tab daily as needed. Patient states he takes diazepam not often unless he really needs medication.      Called [facility] and informed pharmacy that diazepam 1/2 tab sh

## 2018-12-05 ENCOUNTER — TELEPHONE (OUTPATIENT)
Dept: FAMILY MEDICINE CLINIC | Facility: CLINIC | Age: 68
End: 2018-12-05

## 2018-12-06 NOTE — TELEPHONE ENCOUNTER
Pt is requesting an order for labs and Pneumococcal Ppsv23/Pcv13 65+ Years / Low And Medium Risk via [employer]. Please advise.

## 2018-12-06 NOTE — TELEPHONE ENCOUNTER
LMTCB pt requesting labs and vaccines. He has an upcoming [facility] appointment in January. Not sure if he is requesting this done prior to OV. Can transfer to samantha Patinos 106.

## 2018-12-07 ENCOUNTER — TELEPHONE (OUTPATIENT)
Dept: FAMILY MEDICINE CLINIC | Facility: CLINIC | Age: 68
End: 2018-12-07

## 2018-12-10 RX ORDER — SIMVASTATIN 20 MG
TABLET ORAL
Qty: 90 TABLET | Refills: 1 | Status: SHIPPED | OUTPATIENT
Start: 2018-12-10 | End: 2019-11-11

## 2019-01-07 ENCOUNTER — TELEPHONE (OUTPATIENT)
Dept: FAMILY MEDICINE CLINIC | Facility: CLINIC | Age: 69
End: 2019-01-07

## 2019-01-07 DIAGNOSIS — E29.1 HYPOTESTOSTERONEMIA IN MALE: ICD-10-CM

## 2019-01-07 DIAGNOSIS — E78.5 HYPERLIPIDEMIA, UNSPECIFIED HYPERLIPIDEMIA TYPE: ICD-10-CM

## 2019-01-07 DIAGNOSIS — Z00.00 ROUTINE PHYSICAL EXAMINATION: Primary | ICD-10-CM

## 2019-01-07 DIAGNOSIS — Z13.29 THYROID DISORDER SCREEN: ICD-10-CM

## 2019-01-07 DIAGNOSIS — Z12.5 PROSTATE CANCER SCREENING: ICD-10-CM

## 2019-01-07 DIAGNOSIS — I10 ESSENTIAL HYPERTENSION: ICD-10-CM

## 2019-01-07 NOTE — TELEPHONE ENCOUNTER
Patient states he wants his lab orders for his yearly exam.   He doesn't know what orders he needs, he states Dr. Carmen Rivera gives him order to have blood drawn so he can discuss results at his appointment on 1/17.      Also, would like to get the pneumonia i

## 2019-01-14 ENCOUNTER — LAB ENCOUNTER (OUTPATIENT)
Dept: LAB | Age: 69
End: 2019-01-14
Attending: FAMILY MEDICINE
Payer: MEDICARE

## 2019-01-14 DIAGNOSIS — E29.1 HYPOTESTOSTERONEMIA IN MALE: ICD-10-CM

## 2019-01-14 DIAGNOSIS — I10 ESSENTIAL HYPERTENSION: ICD-10-CM

## 2019-01-14 DIAGNOSIS — Z12.5 PROSTATE CANCER SCREENING: ICD-10-CM

## 2019-01-14 DIAGNOSIS — Z13.29 THYROID DISORDER SCREEN: ICD-10-CM

## 2019-01-14 DIAGNOSIS — E78.5 HYPERLIPIDEMIA, UNSPECIFIED HYPERLIPIDEMIA TYPE: ICD-10-CM

## 2019-01-14 LAB
ALBUMIN SERPL BCP-MCNC: 3.9 G/DL (ref 3.5–4.8)
ALBUMIN/GLOB SERPL: 1.3 {RATIO} (ref 1–2)
ALP SERPL-CCNC: 64 U/L (ref 32–100)
ALT SERPL-CCNC: 18 U/L (ref 17–63)
ANION GAP SERPL CALC-SCNC: 13 MMOL/L (ref 0–18)
AST SERPL-CCNC: 22 U/L (ref 15–41)
BASOPHILS # BLD: 0.1 K/UL (ref 0–0.2)
BASOPHILS NFR BLD: 1 %
BILIRUB SERPL-MCNC: 1.8 MG/DL (ref 0.3–1.2)
BILIRUB UR QL: NEGATIVE
BUN SERPL-MCNC: 17 MG/DL (ref 8–20)
BUN/CREAT SERPL: 15.2 (ref 10–20)
CALCIUM SERPL-MCNC: 8.9 MG/DL (ref 8.5–10.5)
CHLORIDE SERPL-SCNC: 95 MMOL/L (ref 95–110)
CHOLEST SERPL-MCNC: 215 MG/DL (ref 110–200)
CLARITY UR: CLEAR
CO2 SERPL-SCNC: 25 MMOL/L (ref 22–32)
COLOR UR: YELLOW
COMPLEXED PSA SERPL-MCNC: 2.38 NG/ML (ref 0.01–4)
CREAT SERPL-MCNC: 1.12 MG/DL (ref 0.5–1.5)
EOSINOPHIL # BLD: 0.2 K/UL (ref 0–0.7)
EOSINOPHIL NFR BLD: 2 %
ERYTHROCYTE [DISTWIDTH] IN BLOOD BY AUTOMATED COUNT: 14.2 % (ref 11–15)
GLOBULIN PLAS-MCNC: 3 G/DL (ref 2.5–3.7)
GLUCOSE SERPL-MCNC: 119 MG/DL (ref 70–99)
GLUCOSE UR-MCNC: NEGATIVE MG/DL
HCT VFR BLD AUTO: 51.1 % (ref 41–52)
HDLC SERPL-MCNC: 37 MG/DL
HGB BLD-MCNC: 17 G/DL (ref 13.5–17.5)
HGB UR QL STRIP.AUTO: NEGATIVE
LDLC SERPL CALC-MCNC: 140 MG/DL (ref 0–99)
LEUKOCYTE ESTERASE UR QL STRIP.AUTO: NEGATIVE
LYMPHOCYTES # BLD: 2.1 K/UL (ref 1–4)
LYMPHOCYTES NFR BLD: 17 %
MCH RBC QN AUTO: 28.4 PG (ref 27–32)
MCHC RBC AUTO-ENTMCNC: 33.2 G/DL (ref 32–37)
MCV RBC AUTO: 85.5 FL (ref 80–100)
MONOCYTES # BLD: 0.7 K/UL (ref 0–1)
MONOCYTES NFR BLD: 6 %
NEUTROPHILS # BLD AUTO: 8.9 K/UL (ref 1.8–7.7)
NEUTROPHILS NFR BLD: 75 %
NITRITE UR QL STRIP.AUTO: NEGATIVE
NONHDLC SERPL-MCNC: 178 MG/DL
OSMOLALITY UR CALC.SUM OF ELEC: 279 MOSM/KG (ref 275–295)
PATIENT FASTING: YES
PH UR: 6 [PH] (ref 5–8)
PLATELET # BLD AUTO: 203 K/UL (ref 140–400)
PMV BLD AUTO: 9.3 FL (ref 7.4–10.3)
POTASSIUM SERPL-SCNC: 3 MMOL/L (ref 3.3–5.1)
PROT SERPL-MCNC: 6.9 G/DL (ref 5.9–8.4)
PROT UR-MCNC: NEGATIVE MG/DL
PSA SERPL-MCNC: 2 NG/ML (ref 0–4)
RBC # BLD AUTO: 5.98 M/UL (ref 4.5–5.9)
SODIUM SERPL-SCNC: 133 MMOL/L (ref 136–144)
SP GR UR STRIP: 1.02 (ref 1–1.03)
TRIGL SERPL-MCNC: 192 MG/DL (ref 1–149)
TSH SERPL-ACNC: 1.14 UIU/ML (ref 0.45–5.33)
UROBILINOGEN UR STRIP-ACNC: <2
VIT C UR-MCNC: NEGATIVE MG/DL
WBC # BLD AUTO: 12 K/UL (ref 4–11)

## 2019-01-14 PROCEDURE — 85025 COMPLETE CBC W/AUTO DIFF WBC: CPT

## 2019-01-14 PROCEDURE — 84402 ASSAY OF FREE TESTOSTERONE: CPT

## 2019-01-14 PROCEDURE — 80053 COMPREHEN METABOLIC PANEL: CPT

## 2019-01-14 PROCEDURE — 81003 URINALYSIS AUTO W/O SCOPE: CPT

## 2019-01-14 PROCEDURE — 84403 ASSAY OF TOTAL TESTOSTERONE: CPT

## 2019-01-14 PROCEDURE — 80061 LIPID PANEL: CPT

## 2019-01-14 PROCEDURE — 36415 COLL VENOUS BLD VENIPUNCTURE: CPT

## 2019-01-14 PROCEDURE — 84443 ASSAY THYROID STIM HORMONE: CPT

## 2019-01-17 ENCOUNTER — OFFICE VISIT (OUTPATIENT)
Dept: FAMILY MEDICINE CLINIC | Facility: CLINIC | Age: 69
End: 2019-01-17
Payer: MEDICARE

## 2019-01-17 VITALS
BODY MASS INDEX: 38.75 KG/M2 | SYSTOLIC BLOOD PRESSURE: 146 MMHG | WEIGHT: 244 LBS | DIASTOLIC BLOOD PRESSURE: 86 MMHG | HEART RATE: 97 BPM | HEIGHT: 66.5 IN

## 2019-01-17 DIAGNOSIS — E78.5 HYPERLIPIDEMIA, UNSPECIFIED HYPERLIPIDEMIA TYPE: ICD-10-CM

## 2019-01-17 DIAGNOSIS — Z86.010 HISTORY OF COLON POLYPS: ICD-10-CM

## 2019-01-17 DIAGNOSIS — Z00.00 PHYSICAL EXAM, ANNUAL: Primary | ICD-10-CM

## 2019-01-17 DIAGNOSIS — Z00.00 ENCOUNTER FOR PREVENTIVE CARE: ICD-10-CM

## 2019-01-17 DIAGNOSIS — R73.9 HYPERGLYCEMIA: ICD-10-CM

## 2019-01-17 DIAGNOSIS — I10 ESSENTIAL HYPERTENSION: ICD-10-CM

## 2019-01-17 LAB
TESTOSTERONE, FREE, S: 15.7 NG/DL
TESTOSTERONE, TOTAL, S: 449 NG/DL

## 2019-01-17 PROCEDURE — G0463 HOSPITAL OUTPT CLINIC VISIT: HCPCS | Performed by: FAMILY MEDICINE

## 2019-01-17 PROCEDURE — 99214 OFFICE O/P EST MOD 30 MIN: CPT | Performed by: FAMILY MEDICINE

## 2019-01-17 PROCEDURE — 93005 ELECTROCARDIOGRAM TRACING: CPT | Performed by: FAMILY MEDICINE

## 2019-01-17 PROCEDURE — 93010 ELECTROCARDIOGRAM REPORT: CPT | Performed by: FAMILY MEDICINE

## 2019-01-17 RX ORDER — SIMVASTATIN 40 MG
40 TABLET ORAL NIGHTLY
Qty: 90 TABLET | Refills: 1 | Status: SHIPPED | OUTPATIENT
Start: 2019-01-17 | End: 2019-01-23

## 2019-01-17 NOTE — PROGRESS NOTES
HPI:    Patient ID: Betty lAlen is a 70year old male.     70year old  male here for complete preventive care physical and for status update on any confirmed chronic medical illnesses and follow up on any previous labs or procedures that were s Omeprazole 40 MG Oral Capsule Delayed Release Take 1 capsule (40 mg total) by mouth daily. Disp: 90 capsule Rfl: 3   Calcium & Magnesium Carbonates 311-232 MG Oral Tab Take 1 tablet by mouth daily.  calcium 500 mg tablet Disp: 90 tablet Rfl: 3   methocarb Nose: Nose normal.   Mouth/Throat: Oropharynx is clear and moist.   Neck: No thyromegaly present. Cardiovascular: Normal rate and regular rhythm. Exam reveals no gallop. Edema not present. Carotid bruit not present.   Pulmonary/Chest: Effort normal or if symptoms worsen or fail to improve.          PRISCILLA#[id number]

## 2019-01-17 NOTE — PATIENT INSTRUCTIONS
Potassium recommended (already on it and must comply). A1c sent to further assessment of elevated blood sugar. Medication reviewed and renewed where needed and appropriate. Comply with medications. Monitor blood pressures and record at home.  Limit salt

## 2019-01-22 ENCOUNTER — TELEPHONE (OUTPATIENT)
Dept: FAMILY MEDICINE CLINIC | Facility: CLINIC | Age: 69
End: 2019-01-22

## 2019-01-22 NOTE — TELEPHONE ENCOUNTER
Pharmacy calling back to ask if there is duplication of therapy they have filled simvastatin 20mg and have now received rx for simvastatin 40mg.  Chart reviewed, patient was seen in office 1/17/19 and simvastatin was increased to 40mg, directed them to disc

## 2019-01-23 ENCOUNTER — TELEPHONE (OUTPATIENT)
Dept: OTHER | Age: 69
End: 2019-01-23

## 2019-01-23 NOTE — TELEPHONE ENCOUNTER
Pt informed of FJR's recommendations below and pt agrees to stay on simvastatin 20 mg and work on diet and exercise. Called [facility] and informed to Emery Martinez., RANGEL, NIKITAR actually wants to keep  Simvastatin 20 mg active and d/c 40 mg. Med list updated.

## 2019-01-23 NOTE — TELEPHONE ENCOUNTER
Hold off on the increase. If the patient still has his 20 mg strength, then encourage him to continue with that and improve exercise frequency as well as improve dietary choices more consistently.

## 2019-01-23 NOTE — TELEPHONE ENCOUNTER
[facility] pharmacy calling for Clarification. Pt has order for Simvastatin 20 mg and Simvastatin 40 mg.    Per OV notes pt was recently increased to Simvastatin 40 mg. Ok to d/c Simvastatin 20 mg off of Med Record. Please advise.

## 2019-01-26 ENCOUNTER — PATIENT MESSAGE (OUTPATIENT)
Dept: FAMILY MEDICINE CLINIC | Facility: CLINIC | Age: 69
End: 2019-01-26

## 2019-01-26 NOTE — TELEPHONE ENCOUNTER
Please call patient on this, last on med list in 2017. Me   to Adwoa Dupont           1/26/19 10:15 AM   Lazaro Albert, We got a refill request from your pharmacy for potassium. It is not current on your medication list for us. Are you taking this?  Pl

## 2019-01-28 RX ORDER — POTASSIUM CHLORIDE 1500 MG/1
TABLET, FILM COATED, EXTENDED RELEASE ORAL
Qty: 90 TABLET | Refills: 1 | Status: SHIPPED | OUTPATIENT
Start: 2019-01-28 | End: 2021-01-28 | Stop reason: ALTCHOICE

## 2019-01-28 NOTE — TELEPHONE ENCOUNTER
Verified Potassium Cl refill request with pt. Pt states Dr. Jody Rascon informed him his potassium level was border line low and advised him to eat bananas. Pt thought he should be taking potassium so called pharmacy for a refill. Please advise.

## 2019-02-22 DIAGNOSIS — K21.9 GASTROESOPHAGEAL REFLUX DISEASE, ESOPHAGITIS PRESENCE NOT SPECIFIED: ICD-10-CM

## 2019-02-22 RX ORDER — OMEPRAZOLE 40 MG/1
CAPSULE, DELAYED RELEASE ORAL
Qty: 90 CAPSULE | Refills: 0 | Status: SHIPPED | OUTPATIENT
Start: 2019-02-22 | End: 2019-04-12

## 2019-04-12 DIAGNOSIS — K21.9 GASTROESOPHAGEAL REFLUX DISEASE, ESOPHAGITIS PRESENCE NOT SPECIFIED: ICD-10-CM

## 2019-04-12 DIAGNOSIS — I10 ESSENTIAL HYPERTENSION: ICD-10-CM

## 2019-04-13 RX ORDER — VALSARTAN AND HYDROCHLOROTHIAZIDE 320; 25 MG/1; MG/1
1 TABLET, FILM COATED ORAL DAILY
Qty: 90 TABLET | Refills: 1 | Status: SHIPPED | OUTPATIENT
Start: 2019-04-13 | End: 2019-11-11

## 2019-04-13 RX ORDER — OMEPRAZOLE 40 MG/1
CAPSULE, DELAYED RELEASE ORAL
Qty: 90 CAPSULE | Refills: 1 | Status: SHIPPED | OUTPATIENT
Start: 2019-04-13 | End: 2019-11-11

## 2019-04-13 RX ORDER — AMLODIPINE BESYLATE 5 MG/1
TABLET ORAL
Qty: 180 TABLET | Refills: 1 | Status: SHIPPED | OUTPATIENT
Start: 2019-04-13 | End: 2019-11-11

## 2019-05-20 DIAGNOSIS — I10 ESSENTIAL HYPERTENSION: ICD-10-CM

## 2019-05-20 DIAGNOSIS — Q80.0 ICHTHYOSIS VULGARIS: ICD-10-CM

## 2019-05-20 DIAGNOSIS — G89.29 CHRONIC BILATERAL LOW BACK PAIN: ICD-10-CM

## 2019-05-20 DIAGNOSIS — M54.50 CHRONIC BILATERAL LOW BACK PAIN: ICD-10-CM

## 2019-05-20 RX ORDER — METOPROLOL SUCCINATE 50 MG/1
TABLET, EXTENDED RELEASE ORAL
Qty: 90 TABLET | Refills: 1 | Status: SHIPPED | OUTPATIENT
Start: 2019-05-20 | End: 2020-02-11

## 2019-05-21 RX ORDER — AMMONIUM LACTATE 12 G/100G
LOTION TOPICAL
Qty: 225 G | Refills: 0 | Status: SHIPPED | OUTPATIENT
Start: 2019-05-21 | End: 2019-09-23

## 2019-05-21 NOTE — TELEPHONE ENCOUNTER
Review pended refill request as it does not fall under a protocol.   Requested Prescriptions     Pending Prescriptions Disp Refills   • METHOCARBAMOL 500 MG Oral Tab [Pharmacy Med Name: METHOCARBAMOL  500MG  TAB] 120 tablet 0     Sig: TAKE 1 TABLET BY MOUTH

## 2019-05-21 NOTE — TELEPHONE ENCOUNTER
Refill passed per [facility], [facility] protocol.   Hypertensive Medications  Protocol Criteria:  · Appointment scheduled in the past 6 months or in the next 3 months  · BMP or CMP in the past 12 months  · Creatinine result < 2  Recent Outpatient Visits

## 2019-05-21 NOTE — TELEPHONE ENCOUNTER
You can refill the ammonium lactate and Metoprolol. I am not comfortable refilling the muscle relaxer. He has not had a refill since 2017.   He would need to be seen

## 2019-05-22 RX ORDER — METHOCARBAMOL 500 MG/1
TABLET, FILM COATED ORAL
Qty: 120 TABLET | Refills: 0 | OUTPATIENT
Start: 2019-05-22

## 2019-05-22 NOTE — TELEPHONE ENCOUNTER
Spoke with pt and MD message given. Pt states \"I don't use it frequently, it is one of those drugs I like to have on hand in case I need it\"    Pt states no symptoms at this moment, will call to schedule OV if feels he needs the medication.

## 2019-05-22 NOTE — TELEPHONE ENCOUNTER
From: Faith Espinoza RN  To: Manish Diallo  Sent: 1/26/2019 10:15 AM CST  Subject: Refill request    Jed Clemens, We got a refill request from your pharmacy for potassium. It is not current on your medication list for us. Are you taking this?  Please

## 2019-05-23 ENCOUNTER — TELEPHONE (OUTPATIENT)
Dept: FAMILY MEDICINE CLINIC | Facility: CLINIC | Age: 69
End: 2019-05-23

## 2019-05-23 DIAGNOSIS — E29.1 HYPOTESTOSTERONEMIA IN MALE: ICD-10-CM

## 2019-05-23 RX ORDER — TESTOSTERONE CYPIONATE 200 MG/ML
200 INJECTION INTRAMUSCULAR
Qty: 6 VIAL | Refills: 0 | OUTPATIENT
Start: 2019-05-23 | End: 2019-09-23

## 2019-05-23 NOTE — TELEPHONE ENCOUNTER
Review pended refill request.  It does not fall under a protocol.      Requested Prescriptions     Pending Prescriptions Disp Refills   • Testosterone cypionate 200 MG/ML Intramuscular Solution 6 vial 3     Sig: Inject 1 mL (200 mg total) into the muscle ev

## 2019-05-23 NOTE — TELEPHONE ENCOUNTER
Pharmacy requesting verbal approval to to fill     Testosterone cypionate 200 MG/ML Intramuscular Solution Inject 1 mL (200 mg total) into the muscle every 14 (fourteen) days.  Disp: 6 vial Rfl: 3       Please advise

## 2019-09-23 DIAGNOSIS — E29.1 HYPOTESTOSTERONEMIA IN MALE: ICD-10-CM

## 2019-09-23 DIAGNOSIS — Q80.0 ICHTHYOSIS VULGARIS: ICD-10-CM

## 2019-09-23 RX ORDER — TESTOSTERONE CYPIONATE 200 MG/ML
INJECTION INTRAMUSCULAR
Qty: 6 ML | Refills: 0 | Status: SHIPPED | OUTPATIENT
Start: 2019-09-23 | End: 2019-12-25

## 2019-09-23 RX ORDER — AMMONIUM LACTATE 12 G/100G
LOTION TOPICAL
Qty: 225 G | Refills: 0 | Status: SHIPPED | OUTPATIENT
Start: 2019-09-23 | End: 2021-01-28

## 2019-09-24 NOTE — TELEPHONE ENCOUNTER
Review pended refill request as it does not fall under a protocol.     Requested Prescriptions     Pending Prescriptions Disp Refills   • AMMONIUM LACTATE 12 % External Lotion [Pharmacy Med Name: AMMONIUM  12%  LOT  LACTATE] 225 g 0     Sig: APPLY TO AFFECT

## 2019-09-24 NOTE — TELEPHONE ENCOUNTER
Review pended refill request as it does not fall under a protocol.     Requested Prescriptions     Pending Prescriptions Disp Refills   • TESTOSTERONE CYPIONATE 200 MG/ML Intramuscular Solution [Pharmacy Med Name: TESTOSTERONE CYP SDV 200MG/ML] 6 mL      Si

## 2019-10-06 ENCOUNTER — PATIENT MESSAGE (OUTPATIENT)
Dept: FAMILY MEDICINE CLINIC | Facility: CLINIC | Age: 69
End: 2019-10-06

## 2019-10-07 NOTE — TELEPHONE ENCOUNTER
From: Tommy Moore  To: Jose Delgadillo DO  Sent: 10/6/2019 4:05 PM CDT  Subject: Prescription Question    Blairlo Dr Christian Mcleod,  I have requested a refill for TESTOSTERONE CYPIONATE 200 MG/ML Intramuscular Solution.  And in the past 3 times this med wa

## 2019-10-10 NOTE — TELEPHONE ENCOUNTER
Called [facility] and spoke with Tyler Godoy. He advised that when this was faxed, it had no cover or information of whether it was from a doctor's office. Advised that Dr. Thalia Gonzales has ordered this and Tyler Godoy advised he would send it to patient as a rush order.  I have

## 2019-11-11 DIAGNOSIS — K21.9 GASTROESOPHAGEAL REFLUX DISEASE, ESOPHAGITIS PRESENCE NOT SPECIFIED: ICD-10-CM

## 2019-11-11 DIAGNOSIS — I10 ESSENTIAL HYPERTENSION: ICD-10-CM

## 2019-11-13 ENCOUNTER — LAB ENCOUNTER (OUTPATIENT)
Dept: LAB | Age: 69
End: 2019-11-13
Attending: FAMILY MEDICINE
Payer: MEDICARE

## 2019-11-13 DIAGNOSIS — Z13.220 LIPID SCREENING: ICD-10-CM

## 2019-11-13 DIAGNOSIS — I10 HYPERTENSION, ESSENTIAL: ICD-10-CM

## 2019-11-13 DIAGNOSIS — Z12.5 PROSTATE CANCER SCREENING: ICD-10-CM

## 2019-11-13 DIAGNOSIS — R73.9 HYPERGLYCEMIA: ICD-10-CM

## 2019-11-13 DIAGNOSIS — Z13.29 THYROID DISORDER SCREEN: ICD-10-CM

## 2019-11-13 DIAGNOSIS — Z00.00 ROUTINE PHYSICAL EXAMINATION: ICD-10-CM

## 2019-11-13 PROCEDURE — 83036 HEMOGLOBIN GLYCOSYLATED A1C: CPT

## 2019-11-13 PROCEDURE — 80053 COMPREHEN METABOLIC PANEL: CPT

## 2019-11-13 PROCEDURE — 85025 COMPLETE CBC W/AUTO DIFF WBC: CPT

## 2019-11-13 PROCEDURE — 36415 COLL VENOUS BLD VENIPUNCTURE: CPT

## 2019-11-13 PROCEDURE — 81003 URINALYSIS AUTO W/O SCOPE: CPT

## 2019-11-13 PROCEDURE — 84443 ASSAY THYROID STIM HORMONE: CPT

## 2019-11-13 PROCEDURE — 80061 LIPID PANEL: CPT

## 2019-11-14 RX ORDER — SIMVASTATIN 20 MG
TABLET ORAL
Qty: 90 TABLET | Refills: 1 | Status: SHIPPED | OUTPATIENT
Start: 2019-11-14 | End: 2020-05-19

## 2019-11-14 RX ORDER — OMEPRAZOLE 40 MG/1
CAPSULE, DELAYED RELEASE ORAL
Qty: 90 CAPSULE | Refills: 1 | Status: SHIPPED | OUTPATIENT
Start: 2019-11-14 | End: 2020-05-19

## 2019-11-14 RX ORDER — AMLODIPINE BESYLATE 5 MG/1
TABLET ORAL
Qty: 180 TABLET | Refills: 1 | Status: SHIPPED | OUTPATIENT
Start: 2019-11-14 | End: 2020-05-19

## 2019-11-14 RX ORDER — VALSARTAN AND HYDROCHLOROTHIAZIDE 320; 25 MG/1; MG/1
1 TABLET, FILM COATED ORAL DAILY
Qty: 90 TABLET | Refills: 1 | Status: SHIPPED | OUTPATIENT
Start: 2019-11-14 | End: 2020-05-19

## 2019-11-14 NOTE — TELEPHONE ENCOUNTER
Hypertensive Medications Refill passed per [facility], [facility] protocol.     Protocol Criteria:  · Appointment scheduled in the past 6 months or in the next 3 months  · BMP or CMP in the past 12 months  · Creatinine result < 2  Recent Outpatient Visits

## 2019-11-14 NOTE — TELEPHONE ENCOUNTER
Cholesterol Medications Refill passed per [facility], [facility] protocol.     Protocol Criteria:  · Appointment scheduled in the past 12 months or in the next 3 months  · ALT & LDL on file in the past 12 months  · ALT result < 80  · LDL result <130   Recent Outpa

## 2019-11-14 NOTE — TELEPHONE ENCOUNTER
Refill Protocol Appointment Criteria-  GI med Refill passed per [facility], [facility] protocol.       · Appointment scheduled in the past 12 months or in the next 3 months  Recent Outpatient Visits            10 months ago Physical exam, annual    [facility]

## 2019-11-15 ENCOUNTER — OFFICE VISIT (OUTPATIENT)
Dept: FAMILY MEDICINE CLINIC | Facility: CLINIC | Age: 69
End: 2019-11-15
Payer: MEDICARE

## 2019-11-15 VITALS
WEIGHT: 248 LBS | HEIGHT: 66.5 IN | SYSTOLIC BLOOD PRESSURE: 160 MMHG | RESPIRATION RATE: 17 BRPM | DIASTOLIC BLOOD PRESSURE: 90 MMHG | BODY MASS INDEX: 39.39 KG/M2

## 2019-11-15 DIAGNOSIS — E66.9 OBESITY (BMI 30-39.9): ICD-10-CM

## 2019-11-15 DIAGNOSIS — Z23 FLU VACCINE NEED: ICD-10-CM

## 2019-11-15 DIAGNOSIS — I10 ESSENTIAL HYPERTENSION: ICD-10-CM

## 2019-11-15 DIAGNOSIS — Z12.11 COLON CANCER SCREENING: ICD-10-CM

## 2019-11-15 DIAGNOSIS — E78.5 HYPERLIPIDEMIA, UNSPECIFIED HYPERLIPIDEMIA TYPE: ICD-10-CM

## 2019-11-15 DIAGNOSIS — R94.31 ABNORMAL FINDING ON EKG: Primary | ICD-10-CM

## 2019-11-15 DIAGNOSIS — Z23 NEED FOR TDAP VACCINATION: ICD-10-CM

## 2019-11-15 DIAGNOSIS — Z00.00 MEDICARE ANNUAL WELLNESS VISIT, INITIAL: ICD-10-CM

## 2019-11-15 PROCEDURE — G0438 PPPS, INITIAL VISIT: HCPCS | Performed by: FAMILY MEDICINE

## 2019-11-15 PROCEDURE — 99214 OFFICE O/P EST MOD 30 MIN: CPT | Performed by: FAMILY MEDICINE

## 2019-11-15 NOTE — PROGRESS NOTES
HPI:    Patient ID: Eloy Byrd is a 78year old male.     78year old  male here for medicare physical and for status update on any confirmed chronic medical illnesses and follow up on any previous labs or procedures that were suggestive or in Intramuscular Solution INJECT 1 ML (200MG)  INTRAMUSCULARLY EVERY 14  DAYS.  DISCARD UNUSED PORTION  AFTER FIRST USE 6 mL 0   • AMMONIUM LACTATE 12 % External Lotion APPLY TO AFFECTED AREA(S)  TOPICALLY TWO TIMES DAILY 225 g 0   • POTASSIUM CHLORIDE ER 20 M Update Health Maintenance if applicable    Flex Sigmoidoscopy Screen every 5 years No results found for this or any previous visit. No flowsheet data found. Fecal Occult Blood Annually No results found for: FOB, OCCULTSTOOL No flowsheet data found. Testing Annually No results found for this or any previous visit. No flowsheet data found.         General Health     In the past six months, have you lost more than 10 pounds without trying?: 2 - No    Has your appetite been poor?: No    Type of Diet: Othe problem hearing over the telephone:  Sometimes I have trouble following the conversations when two or more people are talking at the same time:  No   I have trouble understanding things on the TV:  Sometimes I have to strain to understand conversations:  N No bruits in the carotids. Carotid bruit not present. Pulmonary/Chest: Effort normal and breath sounds normal. No respiratory distress. Abdominal: Soft. Bowel sounds are normal. He exhibits no distension. There is no tenderness.    Neurological: He is

## 2019-11-15 NOTE — PATIENT INSTRUCTIONS
Medication reviewed and renewed where needed and appropriate. Comply with medications. Monitor blood pressures and record at home. Limit salt intake. Encouraged physical fitness and daily physical activity daily. Aqua exercises have been recommended.

## 2019-12-24 DIAGNOSIS — E29.1 HYPOTESTOSTERONEMIA IN MALE: ICD-10-CM

## 2019-12-25 NOTE — TELEPHONE ENCOUNTER
Review pended refill request as it does not fall under a protocol.   Requested Prescriptions     Pending Prescriptions Disp Refills   • TESTOSTERONE CYPIONATE 200 MG/ML Intramuscular Solution [Pharmacy Med Name: TESTOSTERONE CYP SDV 200MG/ML] 6 mL 0     Sig

## 2019-12-26 RX ORDER — TESTOSTERONE CYPIONATE 200 MG/ML
INJECTION INTRAMUSCULAR
Qty: 6 ML | Refills: 1 | Status: SHIPPED | OUTPATIENT
Start: 2019-12-26 | End: 2020-06-09

## 2020-02-11 DIAGNOSIS — I10 ESSENTIAL HYPERTENSION: ICD-10-CM

## 2020-02-11 RX ORDER — METOPROLOL SUCCINATE 50 MG/1
TABLET, EXTENDED RELEASE ORAL
Qty: 90 TABLET | Refills: 1 | Status: SHIPPED | OUTPATIENT
Start: 2020-02-11 | End: 2020-07-27

## 2020-03-14 DIAGNOSIS — E29.1 HYPOTESTOSTERONEMIA IN MALE: ICD-10-CM

## 2020-03-15 NOTE — TELEPHONE ENCOUNTER
Refill passed per [facility], [facility] protocol.     Requested Prescriptions   Pending Prescriptions Disp Refills   • Syringe/Needle, Disp, (BD LUER-KIM SYRINGE) 22G X 1-1/2\" 3 ML Does not apply Misc [Pharmacy Med Name: [id number]\"] 6 each 0

## 2020-05-19 DIAGNOSIS — I10 ESSENTIAL HYPERTENSION: ICD-10-CM

## 2020-05-19 DIAGNOSIS — K21.9 GASTROESOPHAGEAL REFLUX DISEASE, ESOPHAGITIS PRESENCE NOT SPECIFIED: ICD-10-CM

## 2020-05-19 RX ORDER — VALSARTAN AND HYDROCHLOROTHIAZIDE 320; 25 MG/1; MG/1
1 TABLET, FILM COATED ORAL DAILY
Qty: 90 TABLET | Refills: 1 | Status: SHIPPED | OUTPATIENT
Start: 2020-05-19 | End: 2020-10-25

## 2020-05-19 RX ORDER — SIMVASTATIN 20 MG
TABLET ORAL
Qty: 90 TABLET | Refills: 1 | Status: SHIPPED | OUTPATIENT
Start: 2020-05-19 | End: 2020-10-25

## 2020-05-19 RX ORDER — AMLODIPINE BESYLATE 5 MG/1
TABLET ORAL
Qty: 180 TABLET | Refills: 1 | Status: SHIPPED | OUTPATIENT
Start: 2020-05-19 | End: 2020-10-25

## 2020-05-19 RX ORDER — OMEPRAZOLE 40 MG/1
CAPSULE, DELAYED RELEASE ORAL
Qty: 90 CAPSULE | Refills: 1 | Status: SHIPPED | OUTPATIENT
Start: 2020-05-19 | End: 2020-10-25

## 2020-06-08 DIAGNOSIS — E29.1 HYPOTESTOSTERONEMIA IN MALE: ICD-10-CM

## 2020-06-09 RX ORDER — TESTOSTERONE CYPIONATE 200 MG/ML
INJECTION INTRAMUSCULAR
Qty: 6 ML | Refills: 0 | Status: SHIPPED | OUTPATIENT
Start: 2020-06-09 | End: 2020-09-07

## 2020-07-27 DIAGNOSIS — I10 ESSENTIAL HYPERTENSION: ICD-10-CM

## 2020-07-27 RX ORDER — METOPROLOL SUCCINATE 50 MG/1
TABLET, EXTENDED RELEASE ORAL
Qty: 90 TABLET | Refills: 3 | Status: SHIPPED | OUTPATIENT
Start: 2020-07-27 | End: 2021-01-28

## 2020-09-06 DIAGNOSIS — E29.1 HYPOTESTOSTERONEMIA IN MALE: ICD-10-CM

## 2020-09-07 RX ORDER — TESTOSTERONE CYPIONATE 200 MG/ML
INJECTION INTRAMUSCULAR
Qty: 6 ML | Refills: 0 | Status: SHIPPED | OUTPATIENT
Start: 2020-09-07 | End: 2020-12-08

## 2020-10-25 DIAGNOSIS — I10 ESSENTIAL HYPERTENSION: ICD-10-CM

## 2020-10-25 DIAGNOSIS — K21.9 GASTROESOPHAGEAL REFLUX DISEASE: ICD-10-CM

## 2020-10-25 RX ORDER — SIMVASTATIN 20 MG
TABLET ORAL
Qty: 90 TABLET | Refills: 3 | Status: ON HOLD | OUTPATIENT
Start: 2020-10-25 | End: 2021-02-08

## 2020-10-25 RX ORDER — OMEPRAZOLE 40 MG/1
CAPSULE, DELAYED RELEASE ORAL
Qty: 90 CAPSULE | Refills: 3 | Status: SHIPPED | OUTPATIENT
Start: 2020-10-25 | End: 2021-10-10

## 2020-10-25 RX ORDER — AMLODIPINE BESYLATE 5 MG/1
TABLET ORAL
Qty: 180 TABLET | Refills: 3 | Status: ON HOLD | OUTPATIENT
Start: 2020-10-25 | End: 2021-02-08

## 2020-10-25 RX ORDER — VALSARTAN AND HYDROCHLOROTHIAZIDE 320; 25 MG/1; MG/1
1 TABLET, FILM COATED ORAL DAILY
Qty: 90 TABLET | Refills: 3 | Status: SHIPPED | OUTPATIENT
Start: 2020-10-25 | End: 2021-10-10

## 2020-11-02 ENCOUNTER — HOSPITAL ENCOUNTER (OUTPATIENT)
Dept: CV DIAGNOSTICS | Facility: HOSPITAL | Age: 70
Discharge: HOME | End: 2020-11-02
Attending: FAMILY MEDICINE
Payer: MEDICARE

## 2020-11-02 DIAGNOSIS — R94.31 ABNORMAL FINDING ON EKG: ICD-10-CM

## 2020-11-02 DIAGNOSIS — I51.7 LVH (LEFT VENTRICULAR HYPERTROPHY): ICD-10-CM

## 2020-11-02 DIAGNOSIS — I77.89 ASCENDING AORTA ENLARGEMENT (HCC): ICD-10-CM

## 2020-11-02 DIAGNOSIS — I10 ESSENTIAL HYPERTENSION: Primary | ICD-10-CM

## 2020-11-02 PROCEDURE — 93306 TTE W/DOPPLER COMPLETE: CPT | Performed by: FAMILY MEDICINE

## 2020-12-02 DIAGNOSIS — E29.1 HYPOTESTOSTERONEMIA IN MALE: ICD-10-CM

## 2020-12-02 RX ORDER — SYRINGE WITH NEEDLE, 1 ML 25GX5/8"
SYRINGE, EMPTY DISPOSABLE MISCELLANEOUS
Qty: 6 EACH | Refills: 1 | Status: SHIPPED | OUTPATIENT
Start: 2020-12-02

## 2020-12-08 DIAGNOSIS — E29.1 HYPOTESTOSTERONEMIA IN MALE: ICD-10-CM

## 2020-12-08 RX ORDER — TESTOSTERONE CYPIONATE 200 MG/ML
INJECTION INTRAMUSCULAR
Qty: 6 ML | Refills: 0 | Status: ON HOLD | OUTPATIENT
Start: 2020-12-08 | End: 2021-02-08

## 2021-01-28 ENCOUNTER — OFFICE VISIT (OUTPATIENT)
Dept: CARDIOLOGY CLINIC | Facility: CLINIC | Age: 71
End: 2021-01-28
Payer: MEDICARE

## 2021-01-28 ENCOUNTER — TELEPHONE (OUTPATIENT)
Dept: CARDIOLOGY CLINIC | Facility: CLINIC | Age: 71
End: 2021-01-28

## 2021-01-28 VITALS
WEIGHT: 242 LBS | HEIGHT: 66.5 IN | BODY MASS INDEX: 38.43 KG/M2 | DIASTOLIC BLOOD PRESSURE: 98 MMHG | SYSTOLIC BLOOD PRESSURE: 169 MMHG | HEART RATE: 102 BPM

## 2021-01-28 DIAGNOSIS — E66.9 OBESITY (BMI 35.0-39.9 WITHOUT COMORBIDITY): ICD-10-CM

## 2021-01-28 DIAGNOSIS — E78.00 HYPERCHOLESTEROLEMIA: ICD-10-CM

## 2021-01-28 DIAGNOSIS — R94.31 ABNORMAL ECG: ICD-10-CM

## 2021-01-28 DIAGNOSIS — I42.9 CARDIOMYOPATHY, UNSPECIFIED TYPE (HCC): Primary | ICD-10-CM

## 2021-01-28 DIAGNOSIS — I10 ESSENTIAL HYPERTENSION: ICD-10-CM

## 2021-01-28 PROCEDURE — 93000 ELECTROCARDIOGRAM COMPLETE: CPT | Performed by: INTERNAL MEDICINE

## 2021-01-28 PROCEDURE — 99205 OFFICE O/P NEW HI 60 MIN: CPT | Performed by: INTERNAL MEDICINE

## 2021-01-28 RX ORDER — ASPIRIN 325 MG
325 TABLET ORAL
Status: ON HOLD | COMMUNITY
End: 2021-02-01

## 2021-01-28 RX ORDER — METOPROLOL SUCCINATE 50 MG/1
50 TABLET, EXTENDED RELEASE ORAL 2 TIMES DAILY
Qty: 90 TABLET | Refills: 3 | Status: ON HOLD | COMMUNITY
Start: 2021-01-28 | End: 2021-02-08

## 2021-01-28 NOTE — TELEPHONE ENCOUNTER
.. Procedure left heart cath w/ possible PCI  scheduled for 2/1/21  with Dr Heide Griffiths (ordered by Dr Corby Cole). Betasept given to patient with instructions. Pre Admission Testing instructions were provided. Directions to go to interventional radiology given.  Inf

## 2021-01-28 NOTE — H&P
The above referenced H&P was reviewed by Gala Guzman MD on 2/1/2021, the patient was examined and no significant changes have occurred in the patient's condition since the H&P was performed.   Risks and benefits were discussed, proceed with procedure as

## 2021-01-28 NOTE — H&P
[facility], [facility]    Cardiac Electrophysiology Consultation  2021    Name:  Hermilo Holt  : 1950    Date of consultation:   2021    Referring physician: Dr. Rajendra Jensen    Reason for Consultation:  Abnormal ECG, abnormal echocardiogram TESTOSTERONE CYPIONATE 200 MG/ML Intramuscular Solution, INJECT INTRAMUSCULARLY 1 ML (200 MG) EVERY 14 DAYS  (DISCARD UNUSED PORTION  AFTER FIRST USE), Disp: 6 mL, Rfl: 0    •  OMEPRAZOLE 40 MG Oral Capsule Delayed Release, TAKE 1 CAPSULE BY MOUTH  DAILY, E1 5.88 ug/ml  - Papaverine HCI 18mg/ ml  - Phentolamine Mesylate 0.6 mg/ ml, Disp: 1 mL, Rfl: 3    •  ammonium lactate 12 % External Lotion, Apply 1 Application topically 2 (two) times daily.  Apply to the affected area of skin., Disp: 1 Bottle, Rfl: 0 motor deficits. Laboratory Data:    ECG performed today, personally reviewed:  Sinus rhythm 95 bpm, right bundle branch block, nonspecific ST/T change    Echocardiogram November 2, 2020:  1. Left ventricle:  The cavity size was at the upper limits of     ECG  3. Essential hypertension  4. Hypercholesterolemia  5. Obesity (BMI 35.0-39.9 without comorbidity)    Mary Boyle is a 69year old with hypertension, hyperlipidemia, and severe obesity.   He has no prior history of myocardial infarction or heart f

## 2021-01-30 ENCOUNTER — NURSE ONLY (OUTPATIENT)
Dept: LAB | Facility: HOSPITAL | Age: 71
End: 2021-01-30
Attending: INTERNAL MEDICINE
Payer: MEDICARE

## 2021-01-30 ENCOUNTER — HOSPITAL ENCOUNTER (OUTPATIENT)
Dept: GENERAL RADIOLOGY | Facility: HOSPITAL | Age: 71
Discharge: HOME | End: 2021-01-30
Attending: INTERNAL MEDICINE
Payer: MEDICARE

## 2021-01-30 DIAGNOSIS — I42.9 CARDIOMYOPATHY, UNSPECIFIED TYPE (HCC): ICD-10-CM

## 2021-01-30 DIAGNOSIS — I10 ESSENTIAL HYPERTENSION: ICD-10-CM

## 2021-01-30 DIAGNOSIS — Z01.818 PREOP TESTING: ICD-10-CM

## 2021-01-30 DIAGNOSIS — R94.31 ABNORMAL ECG: ICD-10-CM

## 2021-01-30 DIAGNOSIS — E66.9 OBESITY (BMI 35.0-39.9 WITHOUT COMORBIDITY): ICD-10-CM

## 2021-01-30 DIAGNOSIS — E78.00 HYPERCHOLESTEROLEMIA: ICD-10-CM

## 2021-01-30 LAB
ANION GAP SERPL CALC-SCNC: 5 MMOL/L (ref 0–18)
BUN BLD-MCNC: 16 MG/DL (ref 7–18)
BUN/CREAT SERPL: 13.1 (ref 10–20)
CALCIUM BLD-MCNC: 9.3 MG/DL (ref 8.5–10.1)
CHLORIDE SERPL-SCNC: 102 MMOL/L (ref 98–112)
CO2 SERPL-SCNC: 34 MMOL/L (ref 21–32)
CREAT BLD-MCNC: 1.22 MG/DL
DEPRECATED RDW RBC AUTO: 41 FL (ref 35.1–46.3)
ERYTHROCYTE [DISTWIDTH] IN BLOOD BY AUTOMATED COUNT: 12.8 % (ref 11–15)
GLUCOSE BLD-MCNC: 109 MG/DL (ref 70–99)
HCT VFR BLD AUTO: 54.6 %
HGB BLD-MCNC: 18 G/DL
MCH RBC QN AUTO: 28.8 PG (ref 26–34)
MCHC RBC AUTO-ENTMCNC: 33 G/DL (ref 31–37)
MCV RBC AUTO: 87.5 FL
OSMOLALITY SERPL CALC.SUM OF ELEC: 294 MOSM/KG (ref 275–295)
PATIENT FASTING Y/N/NP: NO
PLATELET # BLD AUTO: 214 10(3)UL (ref 150–450)
POTASSIUM SERPL-SCNC: 3.4 MMOL/L (ref 3.5–5.1)
RBC # BLD AUTO: 6.24 X10(6)UL
SODIUM SERPL-SCNC: 141 MMOL/L (ref 136–145)
WBC # BLD AUTO: 9.3 X10(3) UL (ref 4–11)

## 2021-01-30 PROCEDURE — 71046 X-RAY EXAM CHEST 2 VIEWS: CPT | Performed by: INTERNAL MEDICINE

## 2021-01-30 PROCEDURE — 85060 BLOOD SMEAR INTERPRETATION: CPT

## 2021-01-30 PROCEDURE — 80048 BASIC METABOLIC PNL TOTAL CA: CPT

## 2021-01-30 PROCEDURE — 36415 COLL VENOUS BLD VENIPUNCTURE: CPT

## 2021-01-30 PROCEDURE — 85027 COMPLETE CBC AUTOMATED: CPT

## 2021-01-31 LAB — SARS-COV-2 RNA RESP QL NAA+PROBE: NOT DETECTED

## 2021-01-31 RX ORDER — POTASSIUM CHLORIDE 14.9 MG/ML
20 INJECTION INTRAVENOUS ONCE
Status: DISCONTINUED | OUTPATIENT
Start: 2021-01-31 | End: 2021-01-31

## 2021-01-31 RX ORDER — POTASSIUM CHLORIDE 14.9 MG/ML
20 INJECTION INTRAVENOUS ONCE
Status: COMPLETED | OUTPATIENT
Start: 2021-02-01 | End: 2021-02-01

## 2021-02-01 ENCOUNTER — APPOINTMENT (OUTPATIENT)
Dept: ULTRASOUND IMAGING | Facility: HOSPITAL | Age: 71
DRG: 229 | End: 2021-02-01
Attending: PHYSICIAN ASSISTANT
Payer: MEDICARE

## 2021-02-01 ENCOUNTER — HOSPITAL ENCOUNTER (OUTPATIENT)
Dept: INTERVENTIONAL RADIOLOGY/VASCULAR | Facility: HOSPITAL | Age: 71
Discharge: HOME | DRG: 229 | End: 2021-02-01
Attending: INTERNAL MEDICINE | Admitting: INTERNAL MEDICINE
Payer: MEDICARE

## 2021-02-01 ENCOUNTER — APPOINTMENT (OUTPATIENT)
Dept: CV DIAGNOSTICS | Facility: HOSPITAL | Age: 71
DRG: 229 | End: 2021-02-01
Attending: PHYSICIAN ASSISTANT
Payer: MEDICARE

## 2021-02-01 VITALS
SYSTOLIC BLOOD PRESSURE: 150 MMHG | HEART RATE: 75 BPM | OXYGEN SATURATION: 94 % | WEIGHT: 243 LBS | DIASTOLIC BLOOD PRESSURE: 95 MMHG | TEMPERATURE: 98 F | RESPIRATION RATE: 21 BRPM | BODY MASS INDEX: 39 KG/M2

## 2021-02-01 DIAGNOSIS — I21.9 MI (MYOCARDIAL INFARCTION) (HCC): ICD-10-CM

## 2021-02-01 DIAGNOSIS — Z23 NEED FOR VACCINATION: ICD-10-CM

## 2021-02-01 DIAGNOSIS — R07.9 CHEST PAIN: ICD-10-CM

## 2021-02-01 DIAGNOSIS — Z01.818 PREOP TESTING: Primary | ICD-10-CM

## 2021-02-01 PROCEDURE — B2151ZZ FLUOROSCOPY OF LEFT HEART USING LOW OSMOLAR CONTRAST: ICD-10-PCS | Performed by: INTERNAL MEDICINE

## 2021-02-01 PROCEDURE — 99152 MOD SED SAME PHYS/QHP 5/>YRS: CPT | Performed by: INTERNAL MEDICINE

## 2021-02-01 PROCEDURE — 93458 L HRT ARTERY/VENTRICLE ANGIO: CPT | Performed by: INTERNAL MEDICINE

## 2021-02-01 PROCEDURE — 93306 TTE W/DOPPLER COMPLETE: CPT | Performed by: PHYSICIAN ASSISTANT

## 2021-02-01 PROCEDURE — 99152 MOD SED SAME PHYS/QHP 5/>YRS: CPT

## 2021-02-01 PROCEDURE — 4A023N7 MEASUREMENT OF CARDIAC SAMPLING AND PRESSURE, LEFT HEART, PERCUTANEOUS APPROACH: ICD-10-PCS | Performed by: INTERNAL MEDICINE

## 2021-02-01 PROCEDURE — 93880 EXTRACRANIAL BILAT STUDY: CPT | Performed by: PHYSICIAN ASSISTANT

## 2021-02-01 PROCEDURE — 93458 L HRT ARTERY/VENTRICLE ANGIO: CPT

## 2021-02-01 PROCEDURE — 36415 COLL VENOUS BLD VENIPUNCTURE: CPT

## 2021-02-01 PROCEDURE — B2111ZZ FLUOROSCOPY OF MULTIPLE CORONARY ARTERIES USING LOW OSMOLAR CONTRAST: ICD-10-PCS | Performed by: INTERNAL MEDICINE

## 2021-02-01 RX ORDER — ASPIRIN 81 MG/1
81 TABLET ORAL DAILY
Qty: 30 TABLET | Refills: 3 | Status: SHIPPED | OUTPATIENT
Start: 2021-02-02

## 2021-02-01 RX ORDER — MIDAZOLAM HYDROCHLORIDE 1 MG/ML
INJECTION INTRAMUSCULAR; INTRAVENOUS
Status: COMPLETED
Start: 2021-02-01 | End: 2021-02-01

## 2021-02-01 RX ORDER — MIDAZOLAM HYDROCHLORIDE 1 MG/ML
INJECTION INTRAMUSCULAR; INTRAVENOUS
Status: DISCONTINUED
Start: 2021-02-01 | End: 2021-02-01 | Stop reason: WASHOUT

## 2021-02-01 RX ORDER — HEPARIN SODIUM 1000 [USP'U]/ML
INJECTION, SOLUTION INTRAVENOUS; SUBCUTANEOUS
Status: COMPLETED
Start: 2021-02-01 | End: 2021-02-01

## 2021-02-01 RX ORDER — NITROGLYCERIN 20 MG/100ML
INJECTION INTRAVENOUS
Status: COMPLETED
Start: 2021-02-01 | End: 2021-02-01

## 2021-02-01 RX ORDER — SODIUM CHLORIDE 9 MG/ML
INJECTION, SOLUTION INTRAVENOUS
Status: COMPLETED | OUTPATIENT
Start: 2021-02-01 | End: 2021-02-01

## 2021-02-01 RX ORDER — ASPIRIN 81 MG/1
81 TABLET ORAL DAILY
Status: DISCONTINUED | OUTPATIENT
Start: 2021-02-01 | End: 2021-02-01

## 2021-02-01 RX ORDER — LIDOCAINE HYDROCHLORIDE 20 MG/ML
INJECTION, SOLUTION EPIDURAL; INFILTRATION; INTRACAUDAL; PERINEURAL
Status: COMPLETED
Start: 2021-02-01 | End: 2021-02-01

## 2021-02-01 RX ORDER — VERAPAMIL HYDROCHLORIDE 2.5 MG/ML
INJECTION, SOLUTION INTRAVENOUS
Status: COMPLETED
Start: 2021-02-01 | End: 2021-02-01

## 2021-02-01 RX ADMIN — POTASSIUM CHLORIDE 20 MEQ: 14.9 INJECTION INTRAVENOUS at 09:41:00

## 2021-02-01 RX ADMIN — SODIUM CHLORIDE: 9 INJECTION, SOLUTION INTRAVENOUS at 09:30:00

## 2021-02-01 NOTE — H&P (VIEW-ONLY)
[facility]    Report of Consultation    Reggie Lopez Patient Status:  Outpatient in a Bed    1950 MRN [id number]   Location [facility] Attending Gary Arshad MD   Hosp Day # 0 PCP Gaby Marshall Maternal Grandmother    • Stroke Maternal Grandmother         CVA (stroke)       Social History  Social History    Tobacco Use      Smoking status: Never Smoker      Smokeless tobacco: Never Used    Alcohol use: Yes      Comment: wine, 1 glass, occasionall methocarbamol 500 MG Oral Tab, Take 1 tablet (500 mg total) by mouth 4 (four) times daily.     •  VIAGRA 100 MG Oral Tab, TAKE 1 TABLET AS NEEDED DAILY 1 HOUR BEFORE SEXUAL ACTIVITY        Allergies    Seasonal                    Review of Systems:   Kermit pathological lesions ulcerations or wounds  Neurologic exam is grossly normal with motor and sensory intact and no obvious cranial nerve abnormalities.     Results:     Laboratory Data:  Lab Results   Component Value Date    WBC 9.3 01/30/2021    HGB 18.0 ( the mammary to this and will plan to bypass the LAD with a vein in multiple locations. His RCA and OM targets appear appropriate. He needs a repeat echo and a carotid duplex to complete his preoperative workup.   I will plan for his operation this Jonny

## 2021-02-01 NOTE — CONSULTS
[facility] HOSP - [facility]    Report of Consultation    Lianet Reaves Patient Status:  Outpatient in a Bed    1950 MRN [id number]   Location [facility] Attending Mary Grace Apodaca MD   Hosp Day # 0 PCP Yam[facility]x [facility] Maternal Grandmother    • Stroke Maternal Grandmother         CVA (stroke)       Social History  Social History    Tobacco Use      Smoking status: Never Smoker      Smokeless tobacco: Never Used    Alcohol use: Yes      Comment: wine, 1 glass, occasionall methocarbamol 500 MG Oral Tab, Take 1 tablet (500 mg total) by mouth 4 (four) times daily.     •  VIAGRA 100 MG Oral Tab, TAKE 1 TABLET AS NEEDED DAILY 1 HOUR BEFORE SEXUAL ACTIVITY        Allergies    Seasonal                    Review of Systems:   Kermit pathological lesions ulcerations or wounds  Neurologic exam is grossly normal with motor and sensory intact and no obvious cranial nerve abnormalities.     Results:     Laboratory Data:  Lab Results   Component Value Date    WBC 9.3 01/30/2021    HGB 18.0 ( the mammary to this and will plan to bypass the LAD with a vein in multiple locations. His RCA and OM targets appear appropriate. He needs a repeat echo and a carotid duplex to complete his preoperative workup.   I will plan for his operation this Jonny

## 2021-02-01 NOTE — PROCEDURES
Cardiologist: Jayleen Khanna MD  Primary Proceduralist: Jayleen Khanna MD  Procedure Performed: LHC, COR and LV  Date of Procedure: 2/1/2021   Indication: Unstable angina, LV dysfunction    Summary of findings: Severe triple-vessel disease, diffuse disease versed and fentanyl was given. Patient was assessed and monitoring of oxygen, heart rate and blood pressure by nurse and myself during the exam 17 minutes.       Elian May MD  02/01/21

## 2021-02-02 ENCOUNTER — TELEPHONE (OUTPATIENT)
Dept: CARDIOLOGY CLINIC | Facility: CLINIC | Age: 71
End: 2021-02-02

## 2021-02-02 NOTE — TELEPHONE ENCOUNTER
Pre procedure labs CBC, BMP  Remigio Flores, MODE  P Em Cardio Clinical Staff             Pre cath labs, low potassium eat high potassium foods      K+ 3.4 CBC stable for patient  Procedure was done 2/1. IV potassium was given.  mychart message to anuj

## 2021-02-03 ENCOUNTER — APPOINTMENT (OUTPATIENT)
Dept: LAB | Facility: HOSPITAL | Age: 71
End: 2021-02-03
Attending: THORACIC SURGERY (CARDIOTHORACIC VASCULAR SURGERY)
Payer: MEDICARE

## 2021-02-03 ENCOUNTER — ANESTHESIA EVENT (OUTPATIENT)
Dept: SURGERY | Facility: HOSPITAL | Age: 71
DRG: 229 | End: 2021-02-03
Payer: MEDICARE

## 2021-02-03 LAB
ALBUMIN SERPL-MCNC: 3.9 G/DL (ref 3.4–5)
ALBUMIN/GLOB SERPL: 1 {RATIO} (ref 1–2)
ALP LIVER SERPL-CCNC: 85 U/L
ALT SERPL-CCNC: 21 U/L
ANION GAP SERPL CALC-SCNC: 6 MMOL/L (ref 0–18)
ANTIBODY SCREEN: NEGATIVE
APTT PPP: 32.4 SECONDS (ref 23.2–35.3)
AST SERPL-CCNC: 21 U/L (ref 15–37)
BACTERIA UR QL AUTO: NEGATIVE /HPF
BASOPHILS # BLD AUTO: 0.07 X10(3) UL (ref 0–0.2)
BASOPHILS NFR BLD AUTO: 0.7 %
BILIRUB SERPL-MCNC: 1.4 MG/DL (ref 0.1–2)
BILIRUB UR QL: NEGATIVE
BUN BLD-MCNC: 15 MG/DL (ref 7–18)
BUN/CREAT SERPL: 12.7 (ref 10–20)
CALCIUM BLD-MCNC: 8.9 MG/DL (ref 8.5–10.1)
CHLORIDE SERPL-SCNC: 104 MMOL/L (ref 98–112)
CLARITY UR: CLEAR
CO2 SERPL-SCNC: 28 MMOL/L (ref 21–32)
COLOR UR: YELLOW
CREAT BLD-MCNC: 1.18 MG/DL
DEPRECATED RDW RBC AUTO: 40.7 FL (ref 35.1–46.3)
EOSINOPHIL # BLD AUTO: 0.14 X10(3) UL (ref 0–0.7)
EOSINOPHIL NFR BLD AUTO: 1.4 %
ERYTHROCYTE [DISTWIDTH] IN BLOOD BY AUTOMATED COUNT: 13.1 % (ref 11–15)
EST. AVERAGE GLUCOSE BLD GHB EST-MCNC: 114 MG/DL (ref 68–126)
GLOBULIN PLAS-MCNC: 4 G/DL (ref 2.8–4.4)
GLUCOSE BLD-MCNC: 136 MG/DL (ref 70–99)
GLUCOSE UR-MCNC: NEGATIVE MG/DL
HAV IGM SER QL: 2 MG/DL (ref 1.6–2.6)
HBA1C MFR BLD HPLC: 5.6 % (ref ?–5.7)
HCT VFR BLD AUTO: 56.4 %
HGB BLD-MCNC: 18.8 G/DL
HGB UR QL STRIP.AUTO: NEGATIVE
IMM GRANULOCYTES # BLD AUTO: 0.04 X10(3) UL (ref 0–1)
IMM GRANULOCYTES NFR BLD: 0.4 %
INR BLD: 0.91 (ref 0.9–1.2)
KETONES UR-MCNC: NEGATIVE MG/DL
LEUKOCYTE ESTERASE UR QL STRIP.AUTO: NEGATIVE
LYMPHOCYTES # BLD AUTO: 1.82 X10(3) UL (ref 1–4)
LYMPHOCYTES NFR BLD AUTO: 17.8 %
M PROTEIN MFR SERPL ELPH: 7.9 G/DL (ref 6.4–8.2)
MCH RBC QN AUTO: 29.1 PG (ref 26–34)
MCHC RBC AUTO-ENTMCNC: 33.3 G/DL (ref 31–37)
MCV RBC AUTO: 87.4 FL
MONOCYTES # BLD AUTO: 0.64 X10(3) UL (ref 0.1–1)
MONOCYTES NFR BLD AUTO: 6.3 %
MRSA DNA SPEC QL NAA+PROBE: NEGATIVE
NEUTROPHILS # BLD AUTO: 7.53 X10 (3) UL (ref 1.5–7.7)
NEUTROPHILS # BLD AUTO: 7.53 X10(3) UL (ref 1.5–7.7)
NEUTROPHILS NFR BLD AUTO: 73.4 %
NITRITE UR QL STRIP.AUTO: NEGATIVE
OSMOLALITY SERPL CALC.SUM OF ELEC: 289 MOSM/KG (ref 275–295)
PH UR: 7 [PH] (ref 5–8)
PLATELET # BLD AUTO: 108 10(3)UL (ref 150–450)
POTASSIUM SERPL-SCNC: 3.4 MMOL/L (ref 3.5–5.1)
PROT UR-MCNC: 30 MG/DL
PROTHROMBIN TIME: 12.1 SECONDS (ref 11.8–14.5)
RBC # BLD AUTO: 6.45 X10(6)UL
RBC #/AREA URNS AUTO: 3 /HPF
RH BLOOD TYPE: POSITIVE
SARS-COV-2 RNA RESP QL NAA+PROBE: NOT DETECTED
SODIUM SERPL-SCNC: 138 MMOL/L (ref 136–145)
SP GR UR STRIP: 1.02 (ref 1–1.03)
UROBILINOGEN UR STRIP-ACNC: 2
WBC # BLD AUTO: 10.2 X10(3) UL (ref 4–11)
WBC #/AREA URNS AUTO: <1 /HPF

## 2021-02-03 NOTE — CM/SW NOTE
02/03/21 1000   CM/SW Referral Data   Referral Source Physician   Reason for Referral Discharge planning   Informant Patient   Patient Info   Advanced directives?  Yes   Patient's Mental Status Alert;Oriented   Patient's [address]   Number o

## 2021-02-03 NOTE — ANESTHESIA PREPROCEDURE EVALUATION
Anesthesia PreOp Note    HPI:     Olivia Ohara is a 69year old male who presents for preoperative consultation requested by: Rancho Valenzuela MD    Date of Surgery: 2/4/2021    Procedure(s):   HEART CORONARY ARTERY BYPASS GRAFT  Indication: Atherosclerosis hyperlipidemia    • Pneumonia due to organism     15-20 yrs ago   • Problems with swallowing    • Unspecified essential hypertension    • Visual impairment     glasses       Past Surgical History:   Procedure Laterality Date   • CARPAL TUNNEL RELEASE Garrett Phentolamine Mesylate 0.6 mg/ ml, Disp: 1 mL, Rfl: 3    •  ammonium lactate 12 % External Lotion, Apply 1 Application topically 2 (two) times daily. Apply to the affected area of skin. (Patient taking differently: Apply 1 Application topically as needed.  A connections        Talks on phone: Not on file        Gets together: Not on file        Attends [employer] service: Not on file        Active member of club or organization: Not on file        Attends meetings of clubs or organizations: Not on file        R distance: >3 FB  Neck ROM: full  Dental - normal exam     Pulmonary - negative ROS and normal exam   Cardiovascular - normal exam  (+) hypertension, CAD, angina with exertion,     Neuro/Psych    (+)  anxiety/panic attacks,        GI/Hepatic/Renal    (+) GE

## 2021-02-03 NOTE — PROGRESS NOTES
Pt having CABG on 2/4 with Dr. Rose Mary Marrero. Preadmission testing performed today with pt and life partner. Written and verbal information provided re:perioperative expectations with all questions answered. Consents obtained. Dr. Rose Mary Marrero informed of CBC results.  No

## 2021-02-04 ENCOUNTER — APPOINTMENT (OUTPATIENT)
Dept: GENERAL RADIOLOGY | Facility: HOSPITAL | Age: 71
DRG: 229 | End: 2021-02-04
Attending: NURSE PRACTITIONER
Payer: MEDICARE

## 2021-02-04 ENCOUNTER — HOSPITAL ENCOUNTER (INPATIENT)
Facility: HOSPITAL | Age: 71
LOS: 4 days | Discharge: HOME HEALTH | DRG: 229 | End: 2021-02-08
Attending: THORACIC SURGERY (CARDIOTHORACIC VASCULAR SURGERY) | Admitting: THORACIC SURGERY (CARDIOTHORACIC VASCULAR SURGERY)
Payer: MEDICARE

## 2021-02-04 ENCOUNTER — ANESTHESIA (OUTPATIENT)
Dept: SURGERY | Facility: HOSPITAL | Age: 71
DRG: 229 | End: 2021-02-04
Payer: MEDICARE

## 2021-02-04 DIAGNOSIS — M54.50 CHRONIC BILATERAL LOW BACK PAIN: ICD-10-CM

## 2021-02-04 DIAGNOSIS — Q80.0 ICHTHYOSIS VULGARIS: ICD-10-CM

## 2021-02-04 DIAGNOSIS — I25.119 ATHEROSCLEROSIS OF NATIVE CORONARY ARTERY OF NATIVE HEART WITH ANGINA PECTORIS (HCC): ICD-10-CM

## 2021-02-04 DIAGNOSIS — E29.1 HYPOTESTOSTERONEMIA IN MALE: ICD-10-CM

## 2021-02-04 DIAGNOSIS — G89.29 CHRONIC BILATERAL LOW BACK PAIN: ICD-10-CM

## 2021-02-04 PROBLEM — I25.10 CORONARY ARTERY DISEASE INVOLVING NATIVE CORONARY ARTERY OF NATIVE HEART: Chronic | Status: ACTIVE | Noted: 2021-02-04

## 2021-02-04 LAB
ANION GAP SERPL CALC-SCNC: 5 MMOL/L (ref 0–18)
APTT PPP: 37.4 SECONDS (ref 23.2–35.3)
BASE EXCESS BLD CALC-SCNC: -1.5 MMOL/L (ref ?–2)
BASE EXCESS BLD CALC-SCNC: -1.6 MMOL/L (ref ?–2)
BUN BLD-MCNC: 18 MG/DL (ref 7–18)
BUN/CREAT SERPL: 12.4 (ref 10–20)
CA-I BLD-SCNC: 1.05 MMOL/L (ref 0.95–1.32)
CA-I BLD-SCNC: 1.12 MMOL/L (ref 0.95–1.32)
CALCIUM BLD-MCNC: 7.7 MG/DL (ref 8.5–10.1)
CHLORIDE SERPL-SCNC: 109 MMOL/L (ref 98–112)
CO2 SERPL-SCNC: 26 MMOL/L (ref 21–32)
COHGB MFR BLD: 1.8 % (ref 0–1.5)
COHGB MFR BLD: 2.1 % (ref 0–1.5)
CREAT BLD-MCNC: 1.45 MG/DL
DEPRECATED RDW RBC AUTO: 39.8 FL (ref 35.1–46.3)
ERYTHROCYTE [DISTWIDTH] IN BLOOD BY AUTOMATED COUNT: 12.7 % (ref 11–15)
FIBRINOGEN PPP-MCNC: 232 MG/DL (ref 176–491)
GLUCOSE BLD-MCNC: 123 MG/DL (ref 70–99)
GLUCOSE BLD-MCNC: 143 MG/DL (ref 70–99)
GLUCOSE BLD-MCNC: 159 MG/DL (ref 70–99)
GLUCOSE BLD-MCNC: 161 MG/DL (ref 70–99)
GLUCOSE BLD-MCNC: 172 MG/DL (ref 70–99)
GLUCOSE BLDC GLUCOMTR-MCNC: 102 MG/DL (ref 70–99)
GLUCOSE BLDC GLUCOMTR-MCNC: 104 MG/DL (ref 70–99)
GLUCOSE BLDC GLUCOMTR-MCNC: 107 MG/DL (ref 70–99)
GLUCOSE BLDC GLUCOMTR-MCNC: 113 MG/DL (ref 70–99)
GLUCOSE BLDC GLUCOMTR-MCNC: 114 MG/DL (ref 70–99)
GLUCOSE BLDC GLUCOMTR-MCNC: 120 MG/DL (ref 70–99)
GLUCOSE BLDC GLUCOMTR-MCNC: 121 MG/DL (ref 70–99)
GLUCOSE BLDC GLUCOMTR-MCNC: 124 MG/DL (ref 70–99)
GLUCOSE BLDC GLUCOMTR-MCNC: 127 MG/DL (ref 70–99)
GLUCOSE BLDC GLUCOMTR-MCNC: 128 MG/DL (ref 70–99)
GLUCOSE BLDC GLUCOMTR-MCNC: 136 MG/DL (ref 70–99)
GLUCOSE BLDC GLUCOMTR-MCNC: 138 MG/DL (ref 70–99)
GLUCOSE BLDC GLUCOMTR-MCNC: 154 MG/DL (ref 70–99)
HAV IGM SER QL: 3.1 MG/DL (ref 1.6–2.6)
HCO3 BLDA-SCNC: 23.6 MEQ/L (ref 21–27)
HCO3 BLDA-SCNC: 23.7 MEQ/L (ref 21–27)
HCT VFR BLD AUTO: 45.3 %
HGB BLD-MCNC: 14.4 G/DL (ref 13.5–17.5)
HGB BLD-MCNC: 15 G/DL
HGB BLD-MCNC: 15.5 G/DL (ref 13.5–17.5)
INR BLD: 1.34 (ref 0.9–1.2)
ISTAT ACTIVATED CLOTTING TIME: 125 SECONDS (ref 125–137)
ISTAT ACTIVATED CLOTTING TIME: 125 SECONDS (ref 125–137)
ISTAT ACTIVATED CLOTTING TIME: 422 SECONDS (ref 125–137)
ISTAT ACTIVATED CLOTTING TIME: 472 SECONDS (ref 125–137)
ISTAT ACTIVATED CLOTTING TIME: 489 SECONDS (ref 125–137)
ISTAT ACTIVATED CLOTTING TIME: <125 SECONDS (ref 125–137)
ISTAT ACTIVATED CLOTTING TIME: <125 SECONDS (ref 125–137)
ISTAT BLOOD GAS BASE EXCESS: 0 MMOL/L (ref ?–30)
ISTAT BLOOD GAS BASE EXCESS: 1 MMOL/L (ref ?–30)
ISTAT BLOOD GAS BASE EXCESS: 1 MMOL/L (ref ?–30)
ISTAT BLOOD GAS BASE EXCESS: 5 MMOL/L (ref ?–30)
ISTAT BLOOD GAS HCO3: 25.5 MEQ/L (ref 22–26)
ISTAT BLOOD GAS HCO3: 26 MEQ/L (ref 22–26)
ISTAT BLOOD GAS HCO3: 26.1 MEQ/L (ref 22–26)
ISTAT BLOOD GAS HCO3: 29.3 MEQ/L (ref 22–26)
ISTAT BLOOD GAS O2 SATURATION: 100 % (ref 92–100)
ISTAT BLOOD GAS PCO2: 44.2 MMHG (ref 35–45)
ISTAT BLOOD GAS PCO2: 44.9 MMHG (ref 35–45)
ISTAT BLOOD GAS PCO2: 45.9 MMHG (ref 35–45)
ISTAT BLOOD GAS PCO2: 46.8 MMHG (ref 35–45)
ISTAT BLOOD GAS PH: 7.36 (ref 7.35–7.45)
ISTAT BLOOD GAS PH: 7.36 (ref 7.35–7.45)
ISTAT BLOOD GAS PH: 7.38 (ref 7.35–7.45)
ISTAT BLOOD GAS PH: 7.41 (ref 7.35–7.45)
ISTAT BLOOD GAS PO2: 211 MMHG (ref 80–105)
ISTAT BLOOD GAS PO2: 266 MMHG (ref 80–105)
ISTAT BLOOD GAS PO2: 351 MMHG (ref 80–105)
ISTAT BLOOD GAS PO2: 363 MMHG (ref 80–105)
ISTAT BLOOD GAS TCO2: 27 MMOL/L (ref 22–32)
ISTAT BLOOD GAS TCO2: 27 MMOL/L (ref 22–32)
ISTAT BLOOD GAS TCO2: 28 MMOL/L (ref 22–32)
ISTAT BLOOD GAS TCO2: 31 MMOL/L (ref 22–32)
ISTAT HEMATOCRIT: 36 %
ISTAT HEMATOCRIT: 37 %
ISTAT HEMATOCRIT: 38 %
ISTAT HEMATOCRIT: 52 %
ISTAT IONIZED CALCIUM: 1.08 MMOL/L (ref 1.12–1.32)
ISTAT IONIZED CALCIUM: 1.09 MMOL/L (ref 1.12–1.32)
ISTAT IONIZED CALCIUM: 1.09 MMOL/L (ref 1.12–1.32)
ISTAT IONIZED CALCIUM: 1.16 MMOL/L (ref 1.12–1.32)
ISTAT POTASSIUM: 3 MMOL/L (ref 3.6–5.1)
ISTAT POTASSIUM: 4.2 MMOL/L (ref 3.6–5.1)
ISTAT POTASSIUM: 4.3 MMOL/L (ref 3.6–5.1)
ISTAT POTASSIUM: 4.6 MMOL/L (ref 3.6–5.1)
ISTAT SODIUM: 139 MMOL/L (ref 136–145)
ISTAT SODIUM: 139 MMOL/L (ref 136–145)
ISTAT SODIUM: 140 MMOL/L (ref 136–145)
ISTAT SODIUM: 140 MMOL/L (ref 136–145)
LACTIC ACID (BLOOD GAS): 1.9 MMOL/L (ref 0.5–2.2)
LACTIC ACID (BLOOD GAS): 2 MMOL/L (ref 0.5–2.2)
MCH RBC QN AUTO: 28.7 PG (ref 26–34)
MCHC RBC AUTO-ENTMCNC: 33.1 G/DL (ref 31–37)
MCV RBC AUTO: 86.6 FL
METHGB MFR BLD: 1 % SAT (ref 0.4–1.5)
METHGB MFR BLD: 1.2 % SAT (ref 0.4–1.5)
O2 CT BLD-SCNC: 19.6 VOL% (ref 15–23)
O2 CT BLD-SCNC: 20.4 VOL% (ref 15–23)
O2/TOTAL GAS SETTING VFR VENT: 40 %
O2/TOTAL GAS SETTING VFR VENT: 40 %
OSMOLALITY SERPL CALC.SUM OF ELEC: 296 MOSM/KG (ref 275–295)
PCO2 BLDA: 37 MM HG (ref 35–45)
PCO2 BLDA: 44 MM HG (ref 35–45)
PEEP SETTING VENT: 10 CM H2O
PEEP SETTING VENT: 5 CM H2O
PH BLDA: 7.35 [PH] (ref 7.35–7.45)
PH BLDA: 7.4 [PH] (ref 7.35–7.45)
PLATELET # BLD AUTO: 197 10(3)UL (ref 150–450)
PO2 BLDA: 128 MM HG (ref 80–100)
PO2 BLDA: 86 MM HG (ref 80–100)
POTASSIUM (BLOOD GAS): 3.9 MMOL/L (ref 3.3–5.1)
POTASSIUM (BLOOD GAS): 4.1 MMOL/L (ref 3.3–5.1)
POTASSIUM SERPL-SCNC: 3.6 MMOL/L (ref 3.5–5.1)
PRESSURE SUPPORT SETTING VENT: 10 CM H2O
PRESSURE SUPPORT SETTING VENT: 5 CM H2O
PROTHROMBIN TIME: 16.3 SECONDS (ref 11.8–14.5)
PUNCTURE CHARGE: NO
PUNCTURE CHARGE: NO
RBC # BLD AUTO: 5.23 X10(6)UL
RESP RATE: 14 BPM
SAO2 % BLDA: 96.7 % (ref 94–100)
SAO2 % BLDA: 98.7 % (ref 94–100)
SODIUM (BLOOD GAS): 135 MMOL/L (ref 135–145)
SODIUM (BLOOD GAS): 138 MMOL/L (ref 135–145)
SODIUM SERPL-SCNC: 140 MMOL/L (ref 136–145)
SPECIMEN VOL 24H UR: 550 ML
WBC # BLD AUTO: 26.5 X10(3) UL (ref 4–11)

## 2021-02-04 PROCEDURE — B24BZZ4 ULTRASONOGRAPHY OF HEART WITH AORTA, TRANSESOPHAGEAL: ICD-10-PCS | Performed by: STUDENT IN AN ORGANIZED HEALTH CARE EDUCATION/TRAINING PROGRAM

## 2021-02-04 PROCEDURE — 99221 1ST HOSP IP/OBS SF/LOW 40: CPT | Performed by: INTERNAL MEDICINE

## 2021-02-04 PROCEDURE — 5A1221Z PERFORMANCE OF CARDIAC OUTPUT, CONTINUOUS: ICD-10-PCS | Performed by: THORACIC SURGERY (CARDIOTHORACIC VASCULAR SURGERY)

## 2021-02-04 PROCEDURE — 36430 TRANSFUSION BLD/BLD COMPNT: CPT | Performed by: STUDENT IN AN ORGANIZED HEALTH CARE EDUCATION/TRAINING PROGRAM

## 2021-02-04 PROCEDURE — 02C00ZZ EXTIRPATION OF MATTER FROM CORONARY ARTERY, ONE ARTERY, OPEN APPROACH: ICD-10-PCS | Performed by: THORACIC SURGERY (CARDIOTHORACIC VASCULAR SURGERY)

## 2021-02-04 PROCEDURE — 76942 ECHO GUIDE FOR BIOPSY: CPT | Performed by: STUDENT IN AN ORGANIZED HEALTH CARE EDUCATION/TRAINING PROGRAM

## 2021-02-04 PROCEDURE — 021309W BYPASS CORONARY ARTERY, FOUR OR MORE ARTERIES FROM AORTA WITH AUTOLOGOUS VENOUS TISSUE, OPEN APPROACH: ICD-10-PCS | Performed by: THORACIC SURGERY (CARDIOTHORACIC VASCULAR SURGERY)

## 2021-02-04 PROCEDURE — 99232 SBSQ HOSP IP/OBS MODERATE 35: CPT | Performed by: INTERNAL MEDICINE

## 2021-02-04 PROCEDURE — 93312 ECHO TRANSESOPHAGEAL: CPT | Performed by: STUDENT IN AN ORGANIZED HEALTH CARE EDUCATION/TRAINING PROGRAM

## 2021-02-04 PROCEDURE — 71045 X-RAY EXAM CHEST 1 VIEW: CPT | Performed by: NURSE PRACTITIONER

## 2021-02-04 PROCEDURE — 06BQ4ZZ EXCISION OF LEFT SAPHENOUS VEIN, PERCUTANEOUS ENDOSCOPIC APPROACH: ICD-10-PCS | Performed by: THORACIC SURGERY (CARDIOTHORACIC VASCULAR SURGERY)

## 2021-02-04 PROCEDURE — 02100Z9 BYPASS CORONARY ARTERY, ONE ARTERY FROM LEFT INTERNAL MAMMARY, OPEN APPROACH: ICD-10-PCS | Performed by: THORACIC SURGERY (CARDIOTHORACIC VASCULAR SURGERY)

## 2021-02-04 DEVICE — IMPLANTABLE DEVICE
Type: IMPLANTABLE DEVICE | Site: CHEST | Status: FUNCTIONAL
Brand: STERNALOCK® BLU SYSTEM

## 2021-02-04 RX ORDER — DOBUTAMINE HYDROCHLORIDE 100 MG/100ML
INJECTION INTRAVENOUS CONTINUOUS PRN
Status: DISCONTINUED | OUTPATIENT
Start: 2021-02-04 | End: 2021-02-04 | Stop reason: SURG

## 2021-02-04 RX ORDER — SODIUM PHOSPHATE, DIBASIC AND SODIUM PHOSPHATE, MONOBASIC 7; 19 G/133ML; G/133ML
1 ENEMA RECTAL ONCE AS NEEDED
Status: DISCONTINUED | OUTPATIENT
Start: 2021-02-04 | End: 2021-02-04

## 2021-02-04 RX ORDER — ENOXAPARIN SODIUM 100 MG/ML
40 INJECTION SUBCUTANEOUS DAILY
Status: DISCONTINUED | OUTPATIENT
Start: 2021-02-05 | End: 2021-02-04

## 2021-02-04 RX ORDER — CHLORHEXIDINE GLUCONATE 0.12 MG/ML
15 RINSE ORAL
Status: DISCONTINUED | OUTPATIENT
Start: 2021-02-04 | End: 2021-02-04

## 2021-02-04 RX ORDER — DEXMEDETOMIDINE HYDROCHLORIDE 4 UG/ML
INJECTION, SOLUTION INTRAVENOUS CONTINUOUS
Status: DISCONTINUED | OUTPATIENT
Start: 2021-02-04 | End: 2021-02-04

## 2021-02-04 RX ORDER — BISACODYL 10 MG
10 SUPPOSITORY, RECTAL RECTAL
Status: DISCONTINUED | OUTPATIENT
Start: 2021-02-04 | End: 2021-02-04

## 2021-02-04 RX ORDER — DEXMEDETOMIDINE HYDROCHLORIDE 4 UG/ML
INJECTION, SOLUTION INTRAVENOUS CONTINUOUS PRN
Status: DISCONTINUED | OUTPATIENT
Start: 2021-02-04 | End: 2021-02-04 | Stop reason: SURG

## 2021-02-04 RX ORDER — ACETAMINOPHEN 325 MG/1
650 TABLET ORAL EVERY 4 HOURS PRN
Status: DISCONTINUED | OUTPATIENT
Start: 2021-02-04 | End: 2021-02-08

## 2021-02-04 RX ORDER — ASCORBIC ACID 500 MG
1000 TABLET ORAL ONCE
Status: COMPLETED | OUTPATIENT
Start: 2021-02-04 | End: 2021-02-04

## 2021-02-04 RX ORDER — ACETAMINOPHEN 160 MG/5ML
650 SOLUTION ORAL EVERY 6 HOURS PRN
Status: DISCONTINUED | OUTPATIENT
Start: 2021-02-04 | End: 2021-02-08

## 2021-02-04 RX ORDER — SODIUM CHLORIDE 9 MG/ML
INJECTION, SOLUTION INTRAVENOUS CONTINUOUS
Status: DISCONTINUED | OUTPATIENT
Start: 2021-02-04 | End: 2021-02-08

## 2021-02-04 RX ORDER — CEFAZOLIN SODIUM/WATER 2 G/20 ML
2 SYRINGE (ML) INTRAVENOUS
Status: COMPLETED | OUTPATIENT
Start: 2021-02-04 | End: 2021-02-04

## 2021-02-04 RX ORDER — NEOSTIGMINE METHYLSULFATE 1 MG/ML
3 INJECTION INTRAVENOUS ONCE
Status: COMPLETED | OUTPATIENT
Start: 2021-02-04 | End: 2021-02-04

## 2021-02-04 RX ORDER — METOCLOPRAMIDE 10 MG/1
10 TABLET ORAL ONCE
Status: COMPLETED | OUTPATIENT
Start: 2021-02-04 | End: 2021-02-04

## 2021-02-04 RX ORDER — CHLORHEXIDINE GLUCONATE 0.12 MG/ML
15 RINSE ORAL 2 TIMES DAILY
Status: DISCONTINUED | OUTPATIENT
Start: 2021-02-04 | End: 2021-02-08

## 2021-02-04 RX ORDER — GLYCOPYRROLATE 0.2 MG/ML
0.6 INJECTION, SOLUTION INTRAMUSCULAR; INTRAVENOUS ONCE
Status: COMPLETED | OUTPATIENT
Start: 2021-02-04 | End: 2021-02-04

## 2021-02-04 RX ORDER — MORPHINE SULFATE 4 MG/ML
2 INJECTION, SOLUTION INTRAMUSCULAR; INTRAVENOUS EVERY 2 HOUR PRN
Status: DISCONTINUED | OUTPATIENT
Start: 2021-02-04 | End: 2021-02-08

## 2021-02-04 RX ORDER — LORAZEPAM 1 MG/1
1 TABLET ORAL ONCE
Status: COMPLETED | OUTPATIENT
Start: 2021-02-04 | End: 2021-02-04

## 2021-02-04 RX ORDER — BISACODYL 10 MG
10 SUPPOSITORY, RECTAL RECTAL
Status: DISCONTINUED | OUTPATIENT
Start: 2021-02-04 | End: 2021-02-08

## 2021-02-04 RX ORDER — MILRINONE LACTATE 0.2 MG/ML
INJECTION, SOLUTION INTRAVENOUS AS NEEDED
Status: DISCONTINUED | OUTPATIENT
Start: 2021-02-04 | End: 2021-02-08

## 2021-02-04 RX ORDER — MORPHINE SULFATE 4 MG/ML
8 INJECTION, SOLUTION INTRAMUSCULAR; INTRAVENOUS EVERY 2 HOUR PRN
Status: DISCONTINUED | OUTPATIENT
Start: 2021-02-04 | End: 2021-02-08

## 2021-02-04 RX ORDER — DEXTROSE AND SODIUM CHLORIDE 5; .9 G/100ML; G/100ML
INJECTION, SOLUTION INTRAVENOUS CONTINUOUS
Status: DISCONTINUED | OUTPATIENT
Start: 2021-02-04 | End: 2021-02-05

## 2021-02-04 RX ORDER — DEXMEDETOMIDINE HYDROCHLORIDE 4 UG/ML
INJECTION, SOLUTION INTRAVENOUS CONTINUOUS
Status: DISCONTINUED | OUTPATIENT
Start: 2021-02-04 | End: 2021-02-06

## 2021-02-04 RX ORDER — MAGNESIUM SULFATE 1 G/100ML
1 INJECTION INTRAVENOUS AS NEEDED
Status: DISCONTINUED | OUTPATIENT
Start: 2021-02-04 | End: 2021-02-08

## 2021-02-04 RX ORDER — PHENYLEPHRINE HCL 10 MG/ML
VIAL (ML) INJECTION AS NEEDED
Status: DISCONTINUED | OUTPATIENT
Start: 2021-02-04 | End: 2021-02-04 | Stop reason: SURG

## 2021-02-04 RX ORDER — DOBUTAMINE HYDROCHLORIDE 200 MG/100ML
INJECTION INTRAVENOUS CONTINUOUS PRN
Status: DISCONTINUED | OUTPATIENT
Start: 2021-02-04 | End: 2021-02-08

## 2021-02-04 RX ORDER — ASCORBIC ACID 500 MG
500 TABLET ORAL 3 TIMES DAILY
Status: DISCONTINUED | OUTPATIENT
Start: 2021-02-05 | End: 2021-02-08

## 2021-02-04 RX ORDER — CLOPIDOGREL BISULFATE 75 MG/1
75 TABLET ORAL DAILY
Status: DISCONTINUED | OUTPATIENT
Start: 2021-02-05 | End: 2021-02-08

## 2021-02-04 RX ORDER — ROCURONIUM BROMIDE 10 MG/ML
INJECTION, SOLUTION INTRAVENOUS AS NEEDED
Status: DISCONTINUED | OUTPATIENT
Start: 2021-02-04 | End: 2021-02-04 | Stop reason: SURG

## 2021-02-04 RX ORDER — ONDANSETRON 2 MG/ML
4 INJECTION INTRAMUSCULAR; INTRAVENOUS EVERY 6 HOURS PRN
Status: DISCONTINUED | OUTPATIENT
Start: 2021-02-04 | End: 2021-02-08

## 2021-02-04 RX ORDER — MIDAZOLAM HYDROCHLORIDE 1 MG/ML
INJECTION INTRAMUSCULAR; INTRAVENOUS AS NEEDED
Status: DISCONTINUED | OUTPATIENT
Start: 2021-02-04 | End: 2021-02-04 | Stop reason: SURG

## 2021-02-04 RX ORDER — FAMOTIDINE 10 MG/ML
20 INJECTION, SOLUTION INTRAVENOUS 2 TIMES DAILY
Status: DISCONTINUED | OUTPATIENT
Start: 2021-02-04 | End: 2021-02-05

## 2021-02-04 RX ORDER — HEPARIN SODIUM 5000 [USP'U]/ML
5000 INJECTION, SOLUTION INTRAVENOUS; SUBCUTANEOUS EVERY 12 HOURS SCHEDULED
Status: DISCONTINUED | OUTPATIENT
Start: 2021-02-05 | End: 2021-02-08

## 2021-02-04 RX ORDER — ACETAMINOPHEN 10 MG/ML
1000 INJECTION, SOLUTION INTRAVENOUS EVERY 8 HOURS
Status: COMPLETED | OUTPATIENT
Start: 2021-02-04 | End: 2021-02-05

## 2021-02-04 RX ORDER — POLYETHYLENE GLYCOL 3350 17 G/17G
17 POWDER, FOR SOLUTION ORAL DAILY PRN
Status: DISCONTINUED | OUTPATIENT
Start: 2021-02-04 | End: 2021-02-08

## 2021-02-04 RX ORDER — POLYETHYLENE GLYCOL 3350 17 G/17G
17 POWDER, FOR SOLUTION ORAL DAILY PRN
Status: DISCONTINUED | OUTPATIENT
Start: 2021-02-04 | End: 2021-02-04

## 2021-02-04 RX ORDER — VANCOMYCIN HYDROCHLORIDE 1 G/20ML
INJECTION, POWDER, LYOPHILIZED, FOR SOLUTION INTRAVENOUS AS NEEDED
Status: DISCONTINUED | OUTPATIENT
Start: 2021-02-04 | End: 2021-02-04 | Stop reason: HOSPADM

## 2021-02-04 RX ORDER — ACETAMINOPHEN 650 MG/1
650 SUPPOSITORY RECTAL EVERY 6 HOURS PRN
Status: DISCONTINUED | OUTPATIENT
Start: 2021-02-04 | End: 2021-02-08

## 2021-02-04 RX ORDER — CALCIUM CHLORIDE 100 MG/ML
INJECTION INTRAVENOUS; INTRAVENTRICULAR AS NEEDED
Status: DISCONTINUED | OUTPATIENT
Start: 2021-02-04 | End: 2021-02-04 | Stop reason: SURG

## 2021-02-04 RX ORDER — POTASSIUM CHLORIDE 29.8 MG/ML
40 INJECTION INTRAVENOUS AS NEEDED
Status: DISCONTINUED | OUTPATIENT
Start: 2021-02-04 | End: 2021-02-08

## 2021-02-04 RX ORDER — DEXTROSE MONOHYDRATE 25 G/50ML
50 INJECTION, SOLUTION INTRAVENOUS
Status: DISCONTINUED | OUTPATIENT
Start: 2021-02-04 | End: 2021-02-08

## 2021-02-04 RX ORDER — DOXEPIN HYDROCHLORIDE 50 MG/1
1 CAPSULE ORAL DAILY
Status: DISCONTINUED | OUTPATIENT
Start: 2021-02-05 | End: 2021-02-08

## 2021-02-04 RX ORDER — SENNOSIDES 8.6 MG
8.6 TABLET ORAL 2 TIMES DAILY
Status: DISCONTINUED | OUTPATIENT
Start: 2021-02-04 | End: 2021-02-08

## 2021-02-04 RX ORDER — DOCUSATE SODIUM 100 MG/1
100 CAPSULE, LIQUID FILLED ORAL 2 TIMES DAILY
Status: DISCONTINUED | OUTPATIENT
Start: 2021-02-04 | End: 2021-02-04

## 2021-02-04 RX ORDER — LIDOCAINE HYDROCHLORIDE 10 MG/ML
INJECTION, SOLUTION EPIDURAL; INFILTRATION; INTRACAUDAL; PERINEURAL AS NEEDED
Status: DISCONTINUED | OUTPATIENT
Start: 2021-02-04 | End: 2021-02-04 | Stop reason: SURG

## 2021-02-04 RX ORDER — MORPHINE SULFATE 2 MG/ML
2 INJECTION, SOLUTION INTRAMUSCULAR; INTRAVENOUS ONCE
Status: COMPLETED | OUTPATIENT
Start: 2021-02-04 | End: 2021-02-04

## 2021-02-04 RX ORDER — ASPIRIN 81 MG/1
81 TABLET ORAL DAILY
Status: DISCONTINUED | OUTPATIENT
Start: 2021-02-05 | End: 2021-02-08

## 2021-02-04 RX ORDER — ACETAMINOPHEN 325 MG/1
650 TABLET ORAL EVERY 6 HOURS PRN
Status: DISCONTINUED | OUTPATIENT
Start: 2021-02-04 | End: 2021-02-08

## 2021-02-04 RX ORDER — METOCLOPRAMIDE HYDROCHLORIDE 5 MG/ML
10 INJECTION INTRAMUSCULAR; INTRAVENOUS EVERY 6 HOURS
Status: DISCONTINUED | OUTPATIENT
Start: 2021-02-04 | End: 2021-02-05

## 2021-02-04 RX ORDER — FAMOTIDINE 20 MG/1
20 TABLET ORAL 2 TIMES DAILY
Status: DISCONTINUED | OUTPATIENT
Start: 2021-02-04 | End: 2021-02-05

## 2021-02-04 RX ORDER — MAGNESIUM SULFATE HEPTAHYDRATE 40 MG/ML
2 INJECTION, SOLUTION INTRAVENOUS AS NEEDED
Status: DISCONTINUED | OUTPATIENT
Start: 2021-02-04 | End: 2021-02-08

## 2021-02-04 RX ORDER — PROTAMINE SULFATE 10 MG/ML
INJECTION, SOLUTION INTRAVENOUS AS NEEDED
Status: DISCONTINUED | OUTPATIENT
Start: 2021-02-04 | End: 2021-02-04 | Stop reason: SURG

## 2021-02-04 RX ORDER — MORPHINE SULFATE 4 MG/ML
4 INJECTION, SOLUTION INTRAMUSCULAR; INTRAVENOUS EVERY 2 HOUR PRN
Status: DISCONTINUED | OUTPATIENT
Start: 2021-02-04 | End: 2021-02-08

## 2021-02-04 RX ORDER — HYDROCODONE BITARTRATE AND ACETAMINOPHEN 5; 325 MG/1; MG/1
1 TABLET ORAL EVERY 4 HOURS PRN
Status: DISCONTINUED | OUTPATIENT
Start: 2021-02-04 | End: 2021-02-08

## 2021-02-04 RX ORDER — ALBUMIN, HUMAN INJ 5% 5 %
250 SOLUTION INTRAVENOUS ONCE AS NEEDED
Status: DISCONTINUED | OUTPATIENT
Start: 2021-02-04 | End: 2021-02-08

## 2021-02-04 RX ORDER — HYDROCODONE BITARTRATE AND ACETAMINOPHEN 5; 325 MG/1; MG/1
2 TABLET ORAL EVERY 4 HOURS PRN
Status: DISCONTINUED | OUTPATIENT
Start: 2021-02-04 | End: 2021-02-08

## 2021-02-04 RX ORDER — FAMOTIDINE 20 MG/1
20 TABLET ORAL ONCE
Status: COMPLETED | OUTPATIENT
Start: 2021-02-04 | End: 2021-02-04

## 2021-02-04 RX ORDER — SODIUM CHLORIDE 9 MG/ML
83 INJECTION, SOLUTION INTRAVENOUS CONTINUOUS
Status: DISCONTINUED | OUTPATIENT
Start: 2021-02-04 | End: 2021-02-08

## 2021-02-04 RX ORDER — MELATONIN
3 NIGHTLY PRN
Status: DISCONTINUED | OUTPATIENT
Start: 2021-02-04 | End: 2021-02-08

## 2021-02-04 RX ORDER — SODIUM PHOSPHATE, DIBASIC AND SODIUM PHOSPHATE, MONOBASIC 7; 19 G/133ML; G/133ML
1 ENEMA RECTAL ONCE AS NEEDED
Status: DISCONTINUED | OUTPATIENT
Start: 2021-02-04 | End: 2021-02-08

## 2021-02-04 RX ORDER — HEPARIN SODIUM 1000 [USP'U]/ML
INJECTION, SOLUTION INTRAVENOUS; SUBCUTANEOUS AS NEEDED
Status: DISCONTINUED | OUTPATIENT
Start: 2021-02-04 | End: 2021-02-04 | Stop reason: SURG

## 2021-02-04 RX ORDER — CEFAZOLIN SODIUM/WATER 2 G/20 ML
2 SYRINGE (ML) INTRAVENOUS EVERY 8 HOURS
Status: COMPLETED | OUTPATIENT
Start: 2021-02-04 | End: 2021-02-05

## 2021-02-04 RX ORDER — POTASSIUM CHLORIDE 14.9 MG/ML
20 INJECTION INTRAVENOUS AS NEEDED
Status: DISCONTINUED | OUTPATIENT
Start: 2021-02-04 | End: 2021-02-08

## 2021-02-04 RX ORDER — NITROGLYCERIN 20 MG/100ML
INJECTION INTRAVENOUS CONTINUOUS PRN
Status: DISCONTINUED | OUTPATIENT
Start: 2021-02-04 | End: 2021-02-08

## 2021-02-04 RX ORDER — SODIUM CHLORIDE 9 MG/ML
INJECTION, SOLUTION INTRAVENOUS CONTINUOUS PRN
Status: DISCONTINUED | OUTPATIENT
Start: 2021-02-04 | End: 2021-02-04 | Stop reason: SURG

## 2021-02-04 RX ADMIN — HEPARIN SODIUM 43000 UNITS: 1000 INJECTION, SOLUTION INTRAVENOUS; SUBCUTANEOUS at 08:50:00

## 2021-02-04 RX ADMIN — SODIUM CHLORIDE: 9 INJECTION, SOLUTION INTRAVENOUS at 08:01:00

## 2021-02-04 RX ADMIN — SODIUM CHLORIDE: 9 INJECTION, SOLUTION INTRAVENOUS at 09:12:00

## 2021-02-04 RX ADMIN — ROCURONIUM BROMIDE 50 MG: 10 INJECTION, SOLUTION INTRAVENOUS at 10:44:00

## 2021-02-04 RX ADMIN — DEXMEDETOMIDINE HYDROCHLORIDE 1 MCG/KG/HR: 4 INJECTION, SOLUTION INTRAVENOUS at 07:41:00

## 2021-02-04 RX ADMIN — MIDAZOLAM HYDROCHLORIDE 2 MG: 1 INJECTION INTRAMUSCULAR; INTRAVENOUS at 06:57:00

## 2021-02-04 RX ADMIN — MIDAZOLAM HYDROCHLORIDE 2 MG: 1 INJECTION INTRAMUSCULAR; INTRAVENOUS at 10:44:00

## 2021-02-04 RX ADMIN — PROTAMINE SULFATE 450 MG: 10 INJECTION, SOLUTION INTRAVENOUS at 11:02:00

## 2021-02-04 RX ADMIN — CALCIUM CHLORIDE 0.25 G: 100 INJECTION INTRAVENOUS; INTRAVENTRICULAR at 11:26:00

## 2021-02-04 RX ADMIN — CEFAZOLIN SODIUM/WATER 2 G: 2 G/20 ML SYRINGE (ML) INTRAVENOUS at 07:21:00

## 2021-02-04 RX ADMIN — DOBUTAMINE HYDROCHLORIDE 2.5 MCG/KG/MIN: 100 INJECTION INTRAVENOUS at 11:41:00

## 2021-02-04 RX ADMIN — PHENYLEPHRINE HCL 100 MCG: 10 MG/ML VIAL (ML) INJECTION at 07:09:00

## 2021-02-04 RX ADMIN — ROCURONIUM BROMIDE 100 MG: 10 INJECTION, SOLUTION INTRAVENOUS at 07:04:00

## 2021-02-04 RX ADMIN — PHENYLEPHRINE HCL 100 MCG: 10 MG/ML VIAL (ML) INJECTION at 07:51:00

## 2021-02-04 RX ADMIN — PHENYLEPHRINE HCL 200 MCG: 10 MG/ML VIAL (ML) INJECTION at 09:13:00

## 2021-02-04 RX ADMIN — LIDOCAINE HYDROCHLORIDE 50 MG: 10 INJECTION, SOLUTION EPIDURAL; INFILTRATION; INTRACAUDAL; PERINEURAL at 07:03:00

## 2021-02-04 RX ADMIN — SODIUM CHLORIDE: 9 INJECTION, SOLUTION INTRAVENOUS at 07:18:00

## 2021-02-04 RX ADMIN — ROCURONIUM BROMIDE 50 MG: 10 INJECTION, SOLUTION INTRAVENOUS at 08:33:00

## 2021-02-04 RX ADMIN — PHENYLEPHRINE HCL 100 MCG: 10 MG/ML VIAL (ML) INJECTION at 09:11:00

## 2021-02-04 RX ADMIN — DOBUTAMINE HYDROCHLORIDE 5 MCG/KG/MIN: 100 INJECTION INTRAVENOUS at 10:15:00

## 2021-02-04 NOTE — ANESTHESIA PROCEDURE NOTES
Procedure Performed: GUSTAVO     Start Time:  2/4/2021 7:20 AM       End Time:      Preanesthesia Checklist:  Patient identified, IV assessed, risks and benefits discussed, monitors and equipment assessed, procedure being performed at surgeon's request and ane

## 2021-02-04 NOTE — PLAN OF CARE
Pt extubated 1840. Tolerated well. Weaning dobutamine. Saturating well on 3L nc. Neuro intact. No c/o pain.      Problem: Patient Centered Care  Goal: Patient preferences are identified and integrated in the patient's plan of care  Description: Intervention indicated by assessment.  - Educate pt/family on patient safety including physical limitations  - Instruct pt to call for assistance with activity based on assessment  - Modify environment to reduce risk of injury  - Provide assistive devices as appropriat

## 2021-02-04 NOTE — ANESTHESIA PROCEDURE NOTES
Arterial Line  Performed by: Gracie Stanley MD  Authorized by: Gracie Stanley MD     General Information and Staff    Procedure Start:  2/4/2021 7:00 AM  Procedure End:  2/4/2021 7:05 AM  Anesthesiologist:  Gracie Stanley MD  Performed By:  Katharine Bai

## 2021-02-04 NOTE — OPERATIVE REPORT
[facility] OPERATING ROOM  Operative Note     Ana Robledo Location: OR   CSN [id number] MRN [id number]   Admission Date 2/4/2021 Operation Date 2/4/2021   Attending Physician Lachelle Richards MD Operating Physician Lashay Hernandez MD      Preoperative D INC   N/A 16 Implanted   SCREW BN 2.4MM 20MM STERNALOCK - [id number]  SCREW BN 2.4MM 20MM STERNALOCK   BI[employer]   N/A 8 Implanted        Specimen: None     Drains: CT # 32 x 2     Condition: Critical     Estimated Blood Loss: N/A  All cell saver was re throughout the cross-clamp. The inferior wall was exposed and the PDA identified and dissected out with a [place] blade. An arteriotomy was created and extended with [place]aylor scissors.   The vein was brought onto the field, trimmed and spatulated, and an end- scissors. The mammary was retrieved from the left chest, trimmed and spatulated, and an end-to-side anastomosis between the mammary and diagonal was completed using 7-0 Prolene.   The anastomosis was tested for flow with a Doppler probe on the distal diago

## 2021-02-04 NOTE — ANESTHESIA PROCEDURE NOTES
Central Line  Performed by: Danny Coronado MD  Authorized by: Danny Coronado MD     General Information and Staff    Procedure Start:  2/4/2021 7:10 AM  Procedure End:  2/4/2021 7:25 AM  Anesthesiologist:  Danny Coronado MD  Performed by:   Anesthesiolog

## 2021-02-04 NOTE — PROGRESS NOTES
[facility]  Progress Note    Meldon Music Patient Status:  Inpatient    1950 MRN [id number]   Location [facility] 2W/SW Attending Cruz Keller MD   Hosp Day # 0 PCP Yobany Campos DO     Assessment:    1.  CAD.   Presentation Regular rate and rhythm, S1, S2 normal, no murmur or rub. Lungs: Clear anterolaterally without wheezes, rales, rhonchi. Abdomen: Soft, non-tender. Extremities: Without clubbing, cyanosis or edema. Peripheral pulses are 2+. Skin: Warm and dry.

## 2021-02-04 NOTE — ANESTHESIA POSTPROCEDURE EVALUATION
Patient: Jocelynn Ramos    Procedure Summary     Date: 02/04/21 Room / Location: [facility] OR 18 / [facility] OR    Anesthesia Start: 3217 Anesthesia Stop: 6272    Procedure: HEART CORONARY ARTERY BYPASS GRAFT (N/A ) Diagnosis:       Atherosclerosis of fish

## 2021-02-04 NOTE — RESPIRATORY THERAPY NOTE
Received pt open heart pt from OR intubated with 8.0 secured at [email or web address]. Pt place on the vent with the following settings: SIMV/14/550/+10, PS 10, 40%. ABG was done, see the results below. Pt is sedated, no weaning for now.        Results for Costa Koroma

## 2021-02-04 NOTE — ANESTHESIA PROCEDURE NOTES
Airway  Date/Time: 2/4/2021 7:05 AM  Urgency: Elective    Airway not difficult    General Information and Staff    Patient location during procedure: OR  Anesthesiologist: Ronni Hughes MD  Performed: anesthesiologist     Indications and Patient Conditio

## 2021-02-04 NOTE — CONSULTS
[facility] HOSP - [facility]    Report of Consultation    Kalina Mckeon Patient Status:  Inpatient    1950 MRN [id number]   Location [facility] 2W/SW Attending oYrdy Redd MD   Hosp Day # 0 PCP Cynthia Vázquez DO     Date of Admission: Concern  Caffeine Concern        Yes    Comment:coffee, soda, 3 cups daily    Social History Narrative    None on file            Current Medications:    •  0.9% NaCl infusion, , Intravenous, Continuous    •  0.9% NaCl infusion, 83 mL/hr, Intravenous, Cont Enema (FLEET) 7-19 GM/118ML enema 133 mL, 1 enema, Rectal, Once PRN    •  ondansetron HCl (ZOFRAN) injection 4 mg, 4 mg, Intravenous, Q6H PRN    •  Metoclopramide HCl (REGLAN) injection 10 mg, 10 mg, Intravenous, Q6H    •  famoTIDine (PEPCID) tab 20 mg, 20 HYDROcodone-acetaminophen (NORCO) 5-325 MG per tab 1 tablet, 1 tablet, Oral, Q4H PRN    Or    •  HYDROcodone-acetaminophen (NORCO) 5-325 MG per tab 2 tablet, 2 tablet, Oral, Q4H PRN    •  morphINE sulfate (PF) 4 MG/ML injection 2 mg, 2 mg, Intravenous, Q2H differently: Apply 1 Application topically as needed. Apply to the affected area of skin. )    •  methocarbamol 500 MG Oral Tab, Take 1 tablet (500 mg total) by mouth 4 (four) times daily.     •  VIAGRA 100 MG Oral Tab, TAKE 1 TABLET AS NEEDED DAILY 1 HOUR left lower lobe chest tube. Mild bibasilar atelectasis. No pneumothorax. Dictated by (CST): Courtney Evans MD on 2/04/2021 at 12:55 PM     Finalized by (CST): Courtney Evans MD on 2/04/2021 at 12:58 PM            Assessment   1.   POD #0 status post CA

## 2021-02-04 NOTE — PROGRESS NOTES
Pt received from OR approx 1240. Sedated on ventilator. Pt connected to monitor. VSS. Drips on arrival: dobutamine, precedex, Amicar, Propofol, levo paused, insulin paused . BPs above MD parameters Nitroglycerin gtt started.   CT output reported to CV surg

## 2021-02-05 ENCOUNTER — APPOINTMENT (OUTPATIENT)
Dept: GENERAL RADIOLOGY | Facility: HOSPITAL | Age: 71
DRG: 229 | End: 2021-02-05
Attending: NURSE PRACTITIONER
Payer: MEDICARE

## 2021-02-05 LAB
ANION GAP SERPL CALC-SCNC: 6 MMOL/L (ref 0–18)
BASOPHILS # BLD AUTO: 0.02 X10(3) UL (ref 0–0.2)
BASOPHILS NFR BLD AUTO: 0.1 %
BLOOD TYPE BARCODE: 5100
BUN BLD-MCNC: 28 MG/DL (ref 7–18)
BUN/CREAT SERPL: 18.2 (ref 10–20)
CALCIUM BLD-MCNC: 7.6 MG/DL (ref 8.5–10.1)
CHLORIDE SERPL-SCNC: 114 MMOL/L (ref 98–112)
CO2 SERPL-SCNC: 25 MMOL/L (ref 21–32)
CREAT BLD-MCNC: 1.54 MG/DL
DEPRECATED RDW RBC AUTO: 41.2 FL (ref 35.1–46.3)
EOSINOPHIL # BLD AUTO: 0 X10(3) UL (ref 0–0.7)
EOSINOPHIL NFR BLD AUTO: 0 %
ERYTHROCYTE [DISTWIDTH] IN BLOOD BY AUTOMATED COUNT: 12.8 % (ref 11–15)
GLUCOSE BLD-MCNC: 131 MG/DL (ref 70–99)
GLUCOSE BLDC GLUCOMTR-MCNC: 114 MG/DL (ref 70–99)
GLUCOSE BLDC GLUCOMTR-MCNC: 118 MG/DL (ref 70–99)
GLUCOSE BLDC GLUCOMTR-MCNC: 120 MG/DL (ref 70–99)
GLUCOSE BLDC GLUCOMTR-MCNC: 120 MG/DL (ref 70–99)
GLUCOSE BLDC GLUCOMTR-MCNC: 124 MG/DL (ref 70–99)
GLUCOSE BLDC GLUCOMTR-MCNC: 125 MG/DL (ref 70–99)
GLUCOSE BLDC GLUCOMTR-MCNC: 125 MG/DL (ref 70–99)
GLUCOSE BLDC GLUCOMTR-MCNC: 126 MG/DL (ref 70–99)
GLUCOSE BLDC GLUCOMTR-MCNC: 129 MG/DL (ref 70–99)
GLUCOSE BLDC GLUCOMTR-MCNC: 129 MG/DL (ref 70–99)
GLUCOSE BLDC GLUCOMTR-MCNC: 130 MG/DL (ref 70–99)
GLUCOSE BLDC GLUCOMTR-MCNC: 131 MG/DL (ref 70–99)
GLUCOSE BLDC GLUCOMTR-MCNC: 131 MG/DL (ref 70–99)
GLUCOSE BLDC GLUCOMTR-MCNC: 133 MG/DL (ref 70–99)
GLUCOSE BLDC GLUCOMTR-MCNC: 134 MG/DL (ref 70–99)
GLUCOSE BLDC GLUCOMTR-MCNC: 135 MG/DL (ref 70–99)
GLUCOSE BLDC GLUCOMTR-MCNC: 138 MG/DL (ref 70–99)
GLUCOSE BLDC GLUCOMTR-MCNC: 147 MG/DL (ref 70–99)
HAV IGM SER QL: 2.6 MG/DL (ref 1.6–2.6)
HCT VFR BLD AUTO: 41.7 %
HGB BLD-MCNC: 13.7 G/DL
IMM GRANULOCYTES # BLD AUTO: 0.12 X10(3) UL (ref 0–1)
IMM GRANULOCYTES NFR BLD: 0.6 %
LYMPHOCYTES # BLD AUTO: 0.67 X10(3) UL (ref 1–4)
LYMPHOCYTES NFR BLD AUTO: 3.1 %
MCH RBC QN AUTO: 28.7 PG (ref 26–34)
MCHC RBC AUTO-ENTMCNC: 32.9 G/DL (ref 31–37)
MCV RBC AUTO: 87.4 FL
MONOCYTES # BLD AUTO: 1.24 X10(3) UL (ref 0.1–1)
MONOCYTES NFR BLD AUTO: 5.7 %
NEUTROPHILS # BLD AUTO: 19.65 X10 (3) UL (ref 1.5–7.7)
NEUTROPHILS # BLD AUTO: 19.65 X10(3) UL (ref 1.5–7.7)
NEUTROPHILS NFR BLD AUTO: 90.5 %
OSMOLALITY SERPL CALC.SUM OF ELEC: 307 MOSM/KG (ref 275–295)
PLATELET # BLD AUTO: 174 10(3)UL (ref 150–450)
POTASSIUM SERPL-SCNC: 3.7 MMOL/L (ref 3.5–5.1)
RBC # BLD AUTO: 4.77 X10(6)UL
SODIUM SERPL-SCNC: 145 MMOL/L (ref 136–145)
WBC # BLD AUTO: 21.7 X10(3) UL (ref 4–11)

## 2021-02-05 PROCEDURE — 71045 X-RAY EXAM CHEST 1 VIEW: CPT | Performed by: NURSE PRACTITIONER

## 2021-02-05 PROCEDURE — 99232 SBSQ HOSP IP/OBS MODERATE 35: CPT | Performed by: INTERNAL MEDICINE

## 2021-02-05 RX ORDER — VALSARTAN 320 MG/1
320 TABLET ORAL DAILY
Status: DISCONTINUED | OUTPATIENT
Start: 2021-02-05 | End: 2021-02-08

## 2021-02-05 RX ORDER — FAMOTIDINE 20 MG/1
20 TABLET ORAL DAILY
Status: DISCONTINUED | OUTPATIENT
Start: 2021-02-06 | End: 2021-02-08

## 2021-02-05 RX ORDER — METOCLOPRAMIDE HYDROCHLORIDE 5 MG/ML
5 INJECTION INTRAMUSCULAR; INTRAVENOUS EVERY 6 HOURS
Status: DISCONTINUED | OUTPATIENT
Start: 2021-02-05 | End: 2021-02-08

## 2021-02-05 RX ORDER — ATORVASTATIN CALCIUM 40 MG/1
40 TABLET, FILM COATED ORAL NIGHTLY
Status: DISCONTINUED | OUTPATIENT
Start: 2021-02-05 | End: 2021-02-08

## 2021-02-05 RX ORDER — ATORVASTATIN CALCIUM 10 MG/1
10 TABLET, FILM COATED ORAL NIGHTLY
Refills: 3 | Status: DISCONTINUED | OUTPATIENT
Start: 2021-02-05 | End: 2021-02-05

## 2021-02-05 RX ORDER — FAMOTIDINE 10 MG/ML
20 INJECTION, SOLUTION INTRAVENOUS DAILY
Status: DISCONTINUED | OUTPATIENT
Start: 2021-02-06 | End: 2021-02-08

## 2021-02-05 NOTE — RESPIRATORY THERAPY NOTE
Received intubated pt, on following vent settings SIMV/VC 12/550/+10/PS 10/40%. Pt is stable and tolerating well. @8806 RN wean down PEEP to 5 cmH2O  @ 1747 pt was placed on CPAP/PS 5/5/40%, tolerating well.   @1825 spontaneous parameters obtained:    RR 1

## 2021-02-05 NOTE — CARDIAC REHAB
Cardiac Rehab Phase I    Activity:   Chair: Yes   Ambulation:    Assistive Device:    Distance:  feet   Assistance needed:    Patient tolerated activity: . Shower Date:  Tolerated Shower Activity .     Education:  Handouts provided and reviewed: [address]

## 2021-02-05 NOTE — PROGRESS NOTES
[facility] Pharmacy Note:  Renal Dose Adjustment    Mary Boyle has been prescribed famotidine (PEPCID) 20 mg intravenously every 12 hours. Estimated Creatinine Clearance: 39.7 mL/min (A) (based on SCr of 1.54 mg/dL (H)).     Calculated creatinine clearanc

## 2021-02-05 NOTE — PLAN OF CARE
Problem: Patient Centered Care  Goal: Patient preferences are identified and integrated in the patient's plan of care  Description: Interventions:  - What would you like us to know as we care for you?   - Provide timely, complete, and accurate informatio 197.0        Problem: SAFETY ADULT - FALL  Goal: Free from fall injury  Description: INTERVENTIONS:  - Assess pt frequently for physical needs  - Identify cognitive and physical deficits and behaviors that affect risk of falls.   - [place] fall precaution Patient's Short Term Goal:     Interventions:   -   - See additional Care Plan goals for specific interventions  Outcome: Progressing       Problem: CARDIOVASCULAR - ADULT  Goal: Maintains optimal cardiac output and hemodynamic stability  Description: INTE instruct to report SOB or any respiratory difficulty  - Respiratory Therapy support as indicated  - Manage/alleviate anxiety  - Monitor for signs/symptoms of CO2 retention  Outcome: Progressing     On 5L NC. SpO2 range: 90-93%. Clear breaths sounds.  IS don --    BUN 16 15 18  --    CREATSERUM 1.22 1.18 1.45*  --    GFRAA 69 71 56*  --    GFRNAA 59* 62 48*  --    CA 9.3 8.9 7.7*  --     138 140  --    K 3.4* 3.4*  --  3.6    104 109  --    CO2 34.0* 28.0 26.0  --      Potassium replaced per order. injury  Description: (Example usage: patient with low platelets)  INTERVENTIONS:  - Avoid intramuscular injections, enemas and rectal medication administration  - Ensure safe mobilization of patient  - Hold pressure on venipuncture sites to achieve adequat

## 2021-02-05 NOTE — CM/SW NOTE
Received MD Order for 1554 Surgeons   Met with patient during preadmission and pt agreeable to [facility] and to referral to [facility]. Sent referral to [facility] liaison, Darylene Flash, via secure [employer]. F2F entered. Addendum [phone]  [facility] unable to accept.   Refe

## 2021-02-05 NOTE — PROGRESS NOTES
[facility]    Cardiology Progress Note    Paddy Milder Patient Status:  Inpatient    1950 MRN [id number]   Location [facility] 2W/SW Attending Demetria Ohara MD   Hosp Day # 1 PCP Celeste Hernandez DO     2021    Genet S1, S2 normal. No murmur, pericardial rub, S3.  Lungs: Clear without wheezes, rales, rhonchi. Normal excursions and effort. Abdomen: Soft, non-tender. BS-present. Extremities: Without clubbing, cyanosis or edema. Peripheral pulses are 2+. , left leg wra 50 mcg, 50 mcg, Intravenous, Q30 Min PRN    •  acetaminophen (TYLENOL) tab 650 mg, 650 mg, Oral, Q6H PRN    Or    •  acetaminophen (TYLENOL) 160 MG/5ML oral liquid 650 mg, 650 mg, Oral, Q6H PRN    Or    •  acetaminophen (TYLENOL) 650 MG rectal suppository premix infusion, 0.375-0.75 mcg/kg/min, Intravenous, PRN    •  potassium chloride IVPB premix 20 mEq, 20 mEq, Intravenous, PRN    Or    •  potassium chloride IVPB premix 40 mEq, 40 mEq, Intravenous, PRN    •  Magnesium Sulfate in D5W IVPB premix 1 g, 1 g, present, no significant murmur  Chest: Air entry bilateral decreased  Extremity: No significant edema    Assessment:  History of CAD status post CABG  Hypertension  Hyperlipidemia      Plan:  Agree with uptitrating medications as mentioned above          P

## 2021-02-05 NOTE — PROGRESS NOTES
[facility] - [facility]    Progress Note    Andre Rico Patient Status:  Inpatient    1950 MRN [id number]   Location [facility] 2W/SW Attending Ramses Pham MD   Hosp Day # 1 PCP Lia Simon DO     Subjective:  Pt.  Sitting Warm, dry, no LE edema bilat  Pulses: 2+ bilat DP  Skin: sternotomy incision drsg: CDI w/TPW intact/left leg ACE wrap intact  Neurological:  AAOx3, MAEW    Assessment/Plan:  S/P CABG x 5 POD # 1  Encourage pulm toilet: IS- able to perform approx 750cc w/IS

## 2021-02-05 NOTE — CM/SW NOTE
Referral for [facility] sent in Aidin. Order/F2F attached in Aidin.

## 2021-02-05 NOTE — PROGRESS NOTES
[facility]     Progress Note        Nadine London Patient Status:  Inpatient    1950 MRN [id number]   Location [facility] 2W/SW Attending Sultana Cobos MD   Hosp Day # 1 PCP Thresea Cheadle Lana Cress, DO       Subjective:   Chanelle Lorenzana (TYLENOL) tab 650 mg, 650 mg, Oral, Q6H PRN    Or    •  acetaminophen (TYLENOL) 160 MG/5ML oral liquid 650 mg, 650 mg, Oral, Q6H PRN    Or    •  acetaminophen (TYLENOL) 650 MG rectal suppository 650 mg, 650 mg, Rectal, Q6H PRN    •  docusate sodium (COLACE potassium chloride IVPB premix 20 mEq, 20 mEq, Intravenous, PRN    Or    •  potassium chloride IVPB premix 40 mEq, 40 mEq, Intravenous, PRN    •  Magnesium Sulfate in D5W IVPB premix 1 g, 1 g, Intravenous, PRN    •  Magnesium Sulfate IVPB premix SOLN 2 g, nitroprusside (NIPRIDE) 50 mg in D5W infusion     • dexmedetomidine 1 mcg/kg/hr (02/04/21 1300)       Physical Exam  Constitutional: no acute distress  Eyes: PERRL  ENT: nares pateint  Cardio: RRR, S1 S2  Respiratory: Faint basilar crackles  GI: abdomen so -------------------------- Sinus Rhythm -Left atrial enlargement.  -Inferior infarct -probably not recent. - Negative T-waves -Possible Anterolateral ischemia.  ABNORMAL When compared with ECG of 02/04/2021 13:54:57 No significant changes have occurred Inocencia

## 2021-02-05 NOTE — PROGRESS NOTES
[facility] Pharmacy Note:  Renal Dose Adjustment for Metoclopramide (REGLAN)    Poonam Man has been prescribed Metoclopramide (REGLAN) 10 mg every 6 hours. Estimated Creatinine Clearance: 39.7 mL/min (A) (based on SCr of 1.54 mg/dL (H)).     Calculated cr

## 2021-02-06 LAB
ANION GAP SERPL CALC-SCNC: 6 MMOL/L (ref 0–18)
BLOOD TYPE BARCODE: 5100
BUN BLD-MCNC: 33 MG/DL (ref 7–18)
BUN/CREAT SERPL: 28.7 (ref 10–20)
CALCIUM BLD-MCNC: 8 MG/DL (ref 8.5–10.1)
CHLORIDE SERPL-SCNC: 108 MMOL/L (ref 98–112)
CO2 SERPL-SCNC: 26 MMOL/L (ref 21–32)
CREAT BLD-MCNC: 1.15 MG/DL
GLUCOSE BLD-MCNC: 178 MG/DL (ref 70–99)
GLUCOSE BLDC GLUCOMTR-MCNC: 127 MG/DL (ref 70–99)
GLUCOSE BLDC GLUCOMTR-MCNC: 133 MG/DL (ref 70–99)
GLUCOSE BLDC GLUCOMTR-MCNC: 133 MG/DL (ref 70–99)
GLUCOSE BLDC GLUCOMTR-MCNC: 180 MG/DL (ref 70–99)
OSMOLALITY SERPL CALC.SUM OF ELEC: 302 MOSM/KG (ref 275–295)
POTASSIUM SERPL-SCNC: 3.7 MMOL/L (ref 3.5–5.1)
SODIUM SERPL-SCNC: 140 MMOL/L (ref 136–145)

## 2021-02-06 PROCEDURE — 99232 SBSQ HOSP IP/OBS MODERATE 35: CPT | Performed by: INTERNAL MEDICINE

## 2021-02-06 PROCEDURE — 99233 SBSQ HOSP IP/OBS HIGH 50: CPT | Performed by: INTERNAL MEDICINE

## 2021-02-06 PROCEDURE — 99233 SBSQ HOSP IP/OBS HIGH 50: CPT | Performed by: HOSPITALIST

## 2021-02-06 RX ORDER — FUROSEMIDE 10 MG/ML
20 INJECTION INTRAMUSCULAR; INTRAVENOUS ONCE
Status: COMPLETED | OUTPATIENT
Start: 2021-02-06 | End: 2021-02-06

## 2021-02-06 RX ORDER — POTASSIUM CHLORIDE 20 MEQ/1
20 TABLET, EXTENDED RELEASE ORAL ONCE
Status: COMPLETED | OUTPATIENT
Start: 2021-02-06 | End: 2021-02-06

## 2021-02-06 RX ORDER — DIAZEPAM 5 MG/1
2.5 TABLET ORAL 2 TIMES DAILY PRN
Status: DISCONTINUED | OUTPATIENT
Start: 2021-02-06 | End: 2021-02-08

## 2021-02-06 NOTE — OCCUPATIONAL THERAPY NOTE
OCCUPATIONAL THERAPY EVALUATION - INPATIENT     Room Number: 317/573-E  Evaluation Date: 2/6/2021  Type of Evaluation: Initial  Presenting Problem: Pt s/p CABGx5 2/4/2021    Physician Order: IP Consult to Occupational Therapy  Reason for Therapy: ADL/IADL documentation in the [facility] '6 clicks' Inpatient Daily Activity Short Form. Research supports that patients with this level of impairment may benefit from home with 24 hr A & HH therapies. PPE worn includes gloves, surgical mask and goggles.  Patient wo Walk-in shower; Shower chair  Other Equipment: None    Occupation/Status: Retired     Drives: Yes  Patient Regularly Uses: Glasses    Stairs in Home: 6 ALLEN  Use of Assistive Device(s): n/a     Prior Level of [place]: Independent    SUBJECTIVE  'Id like POC, & recommendations   Patient End of Session: Up in chair;Needs met;Call light within reach; With [facility] staff; Family present; All patient questions and concerns addressed;RN aware of session/findings    OT Goals  Patients self stated goal is: return home

## 2021-02-06 NOTE — PROGRESS NOTES
[facility]    Cardiology Progress Note    Seaford Quant Patient Status:  Inpatient    1950 MRN [id number]   Location [facility] 2W/SW Attending Colletta Falco, MD   Hosp Day # 2 PCP Oneyda Wilson DO         Assessment a BID   • mupirocin  1 Application Nasal BID   • Chlorhexidine Gluconate  15 mL Mouth/Throat BID   • multivitamin  1 tablet Oral Daily   • ascorbic acid  500 mg Oral TID   • Clopidogrel Bisulfate  75 mg Oral Daily   • aspirin  81 mg Oral Daily   • Senna  8.6 pneumothorax.     Dictated by (CST): Darshana Espitia MD on 2/04/2021 at 12:55 PM     Finalized by (CST): Darshana Espitia MD on 2/04/2021 at 12:58 PM          Ekg 12-lead    Result Date: 2/5/2021  ECG Report  Interpretation  -------------------------- Sinus Rh

## 2021-02-06 NOTE — PROGRESS NOTES
[facility]    Progress Note    Antoni Zuniga Patient Status:  Inpatient    1950 MRN [id number]   Location [facility] 2W/SW Attending Richar De Oliveira MD   Hosp Day # 2 PCP mAber Rodriguez DO     PCU transfer admiss well    D/w pt, family at bedside and RN        Results:     Lab Results   Component Value Date    WBC 21.7 (H) 02/05/2021    HGB 13.7 02/05/2021    HCT 41.7 02/05/2021    .0 02/05/2021    CREATSERUM 1.15 02/06/2021    BUN 33 (H) 02/06/2021    NA 14

## 2021-02-06 NOTE — DIETARY NOTE
Educated pt and María Elena Card on cardiac diet. Very good understanding and already fairly good compliance. Emphasized benefits of wt loss. Handout and contact information provided.       [address], [address] [facility]   Clinical Dietitian [id number]

## 2021-02-06 NOTE — PROGRESS NOTES
[facility]    Progress Note    Rosemary Toro Patient Status:  Inpatient    1950 MRN [id number]   Location [facility] 2W/SW Attending Prince Erin MD   Hosp Day # 2 PCP Maila Braga DO       Subjective:   Tasha Petersen normal strength and tone.    Psychiatric: calm  Sternum Incision dressing C/D/I; Left leg ace wrap removed leg incision C/D/I  Pulmonary:  clear to auscultation bilaterally with slight ly diminished RLL; CT 70cc/12 hours No air leak noted  Cardiovascular: S BILT 1.4 02/03/2021    TP 7.9 02/03/2021    AST 21 02/03/2021    ALT 21 02/03/2021    PTT 37.4 (H) 02/04/2021    INR 1.34 (H) 02/04/2021    TSH 1.290 11/13/2019    PSA 2.0 01/14/2019    MG 2.6 02/05/2021       Xr Chest Ap Portable  (cpt=71045)    Result Anterolateral ischemia.  ABNORMAL No previous ECGs available Electronically signed on 02/04/2021 at 14:08 by MD Nakita Gray NP  2/6/2021

## 2021-02-06 NOTE — PLAN OF CARE
Rico and Ignacia removed today. Transitioned off insulin and nitro gtt. Currently tolerating 3L NC. ISD goal in 1850 and he achieves 750-1000 x10 qhr. Pain controlled with PRN medication. Walked 5ft in room today.      Problem: Patient Centered Care  Goal: Pa and physical deficits and behaviors that affect risk of falls.   - [place] fall precautions as indicated by assessment.  - Educate pt/family on patient safety including physical limitations  - Instruct pt to call for assistance with activity based on asse Monitor for bleeding, hypotension and signs of decreased cardiac output  - Evaluate effectiveness of vasoactive medications to optimize hemodynamic stability  - Monitor arterial and/or venous puncture sites for bleeding and/or hematoma  - Assess quality of Establish a toileting routine/schedule  - Consider collaborating with pharmacy to review patient's medication profile  Outcome: Progressing     Problem: METABOLIC/FLUID AND ELECTROLYTES - ADULT  Goal: Glucose maintained within prescribed range  Description dressing/incision, wound bed, drain sites and surrounding tissue  - Implement wound care per orders  - Initiate isolation precautions as appropriate  - Initiate Pressure Ulcer prevention bundle as indicated  Outcome: Progressing     Problem: HEMATOLOGIC -

## 2021-02-06 NOTE — PLAN OF CARE
Problem: Patient Centered Care  Goal: Patient preferences are identified and integrated in the patient's plan of care  Description: Interventions:  - What would you like us to know as we care for you?   - Provide timely, complete, and accurate informatio fall injury  Description: INTERVENTIONS:  - Assess pt frequently for physical needs  - Identify cognitive and physical deficits and behaviors that affect risk of falls.   - [place] fall precautions as indicated by assessment.  - Educate pt/family on patie goals for specific interventions  Outcome: Progressing     Problem: CARDIOVASCULAR - ADULT  Goal: Maintains optimal cardiac output and hemodynamic stability  Description: INTERVENTIONS:  - Monitor vital signs, rhythm, and trends  - Monitor for bleeding, hy difficulty  - Respiratory Therapy support as indicated  - Manage/alleviate anxiety  - Monitor for signs/symptoms of CO2 retention  Outcome: Progressing  Note: On 3L NC. SpO2 range: 90-95%. Weaning O2 per order. Diminished breath sounds. IS done.       Probl for signs and symptoms of volume excess or deficit  - Monitor intake, output and patient weight  - Monitor urine specific gravity, serum osmolarity and serum sodium as indicated or ordered  - Monitor response to interventions for patient's volume status, i sites to achieve adequate hemostasis  - Assess for signs and symptoms of internal bleeding  - Monitor lab trends  - Patient is to report abnormal signs of bleeding to staff  - Avoid use of toothpicks and dental floss  - Use electric shaver for shaving  - U

## 2021-02-06 NOTE — PROGRESS NOTES
[facility]    Progress Note    [facility] Patient Status:  Inpatient    1950 MRN [id number]   Location [facility] 2W/SW Attending Vaughn Silver MD   Hosp Day # 2 PCP Karen Longoria DO        Subjective: 21. 7 (H) 02/05/2021    HGB 13.7 02/05/2021    HCT 41.7 02/05/2021    .0 02/05/2021    CREATSERUM 1.15 02/06/2021    BUN 33 (H) 02/06/2021     02/06/2021    K 3.7 02/06/2021     02/06/2021    CO2 26.0 02/06/2021     (H) 02/06/2021 ECG of 02/04/2021 13:54:57 No significant changes have occurred Electronically signed on 02/05/2021 at 07:15 by MD Neil Hamm.  Jimena Huber MD  2/6/2021

## 2021-02-06 NOTE — PLAN OF CARE
Problem: Patient Centered Care  Goal: Patient preferences are identified and integrated in the patient's plan of care  Description: Interventions:  - What would you like us to know as we care for you? Have a life partner at home.  6 stairs into house  - P including physical limitations  - Instruct pt to call for assistance with activity based on assessment  - Modify environment to reduce risk of injury  - Provide assistive devices as appropriate  - Consider OT/PT consult to assist with strengthening/mobilit hemodynamic stability  - Monitor arterial and/or venous puncture sites for bleeding and/or hematoma  - Assess quality of pulses, skin color and temperature  - Assess for signs of decreased coronary artery perfusion - ex.  Angina  - Evaluate fluid balance, a Progressing     Problem: METABOLIC/FLUID AND ELECTROLYTES - ADULT  Goal: Glucose maintained within prescribed range  Description: INTERVENTIONS:  - Monitor Blood Glucose as ordered  - Assess for signs and symptoms of hyperglycemia and hypoglycemia  - Admin appropriate  - Initiate Pressure Ulcer prevention bundle as indicated  Outcome: Progressing     Problem: HEMATOLOGIC - ADULT  Goal: Maintains hematologic stability  Description: INTERVENTIONS  - Assess for signs and symptoms of bleeding or hemorrhage  - Mo

## 2021-02-06 NOTE — PHYSICAL THERAPY NOTE
PHYSICAL THERAPY EVALUATION - INPATIENT     Room Number: 720/916-N  Evaluation Date: 2/6/2021  Type of Evaluation: Initial   Physician Order: PT Eval and Treat    Presenting Problem: CABG x 5 on 2/4  Reason for Therapy: Mobility Dysfunction and Discharge PHYSICAL THERAPY MEDICAL/SOCIAL HISTORY     History related to current admission: Per H&P: \"Patient is a 69year old male with hypertension, hyperlipidemia, obesity, and hypogonadism who presents for an angiogram.  The patient states for the last anahi Layout: Able to live on main level(6 ALLEN, rail on both sides)  Stairs to Enter : 6  Railing: Yes  Stairs to Bedroom: 0       Lives With: (Partner)     Patient Owned Equipment: Rolling walker       Prior Level of Randall: Pt was independent w/ ADLs and training  Transfer training    Patient End of Session: Up in chair;Needs met;Call light within reach;RN aware of session/findings; All patient questions and concerns addressed; Family present    CURRENT GOALS    Goals to be met by: 2/20/21  Patient Goal Telma

## 2021-02-07 ENCOUNTER — APPOINTMENT (OUTPATIENT)
Dept: GENERAL RADIOLOGY | Facility: HOSPITAL | Age: 71
DRG: 229 | End: 2021-02-07
Attending: NURSE PRACTITIONER
Payer: MEDICARE

## 2021-02-07 LAB
ANION GAP SERPL CALC-SCNC: 7 MMOL/L (ref 0–18)
BUN BLD-MCNC: 35 MG/DL (ref 7–18)
BUN/CREAT SERPL: 34.3 (ref 10–20)
CALCIUM BLD-MCNC: 8.6 MG/DL (ref 8.5–10.1)
CHLORIDE SERPL-SCNC: 104 MMOL/L (ref 98–112)
CO2 SERPL-SCNC: 29 MMOL/L (ref 21–32)
CREAT BLD-MCNC: 1.02 MG/DL
GLUCOSE BLD-MCNC: 120 MG/DL (ref 70–99)
GLUCOSE BLDC GLUCOMTR-MCNC: 110 MG/DL (ref 70–99)
GLUCOSE BLDC GLUCOMTR-MCNC: 111 MG/DL (ref 70–99)
GLUCOSE BLDC GLUCOMTR-MCNC: 123 MG/DL (ref 70–99)
GLUCOSE BLDC GLUCOMTR-MCNC: 129 MG/DL (ref 70–99)
OSMOLALITY SERPL CALC.SUM OF ELEC: 299 MOSM/KG (ref 275–295)
POTASSIUM SERPL-SCNC: 3.5 MMOL/L (ref 3.5–5.1)
SODIUM SERPL-SCNC: 140 MMOL/L (ref 136–145)

## 2021-02-07 PROCEDURE — 71046 X-RAY EXAM CHEST 2 VIEWS: CPT | Performed by: NURSE PRACTITIONER

## 2021-02-07 PROCEDURE — 99232 SBSQ HOSP IP/OBS MODERATE 35: CPT | Performed by: INTERNAL MEDICINE

## 2021-02-07 PROCEDURE — 99231 SBSQ HOSP IP/OBS SF/LOW 25: CPT | Performed by: HOSPITALIST

## 2021-02-07 PROCEDURE — 99233 SBSQ HOSP IP/OBS HIGH 50: CPT | Performed by: INTERNAL MEDICINE

## 2021-02-07 RX ORDER — METOPROLOL TARTRATE 50 MG/1
50 TABLET, FILM COATED ORAL ONCE
Status: DISCONTINUED | OUTPATIENT
Start: 2021-02-07 | End: 2021-02-08

## 2021-02-07 RX ORDER — POTASSIUM CHLORIDE 20 MEQ/1
20 TABLET, EXTENDED RELEASE ORAL ONCE
Status: COMPLETED | OUTPATIENT
Start: 2021-02-07 | End: 2021-02-07

## 2021-02-07 RX ORDER — FUROSEMIDE 10 MG/ML
20 INJECTION INTRAMUSCULAR; INTRAVENOUS
Status: COMPLETED | OUTPATIENT
Start: 2021-02-07 | End: 2021-02-07

## 2021-02-07 RX ORDER — HYDROCHLOROTHIAZIDE 25 MG/1
25 TABLET ORAL DAILY
Status: DISCONTINUED | OUTPATIENT
Start: 2021-02-07 | End: 2021-02-08

## 2021-02-07 NOTE — PROGRESS NOTES
[facility]    Cardiology Progress Note    Onetha Drafts Patient Status:  Inpatient    1950 MRN [id number]   Location [facility] 2W/SW Attending Meryle Guarneri, MD   Hosp Day # 3 PCP DO Genet Doyle 320 mg Oral Daily   • atorvastatin  40 mg Oral Nightly   • insulin detemir  15 Units Subcutaneous Daily   • Insulin Aspart Pen  1-5 Units Subcutaneous TID CC   • docusate sodium  100 mg Oral BID   • mupirocin  1 Application Nasal BID   • Chlorhexidine Gluc

## 2021-02-07 NOTE — PHYSICAL THERAPY NOTE
PHYSICAL THERAPY TREATMENT NOTE - INPATIENT     Room Number: 558/488-X       Presenting Problem: CABG x 5 on 2/4    Problem List  Active Problems:    Coronary artery disease involving native coronary artery of native heart    Atherosclerosis of native cristine TOLERANCE   On 1.5 L O2 NC, pt remained between 93-95% O2 saturation with activity, no c/o SOB                      O2 WALK                  AM-PAC '6-Clicks' INPATIENT SHORT FORM - BASIC MOBILITY  How much difficulty does the patient currently have. ..  - stand   Goal #3 Patient is able to ambulate 150 feet with assist device: walker - rolling at assistance level: modified independent   Goal #3   Current Status  SBA ambulation x 200' with R.W.   Goal #4 Patient will negotiate 6 stairs/one curb w/ assistive

## 2021-02-07 NOTE — PROGRESS NOTES
[facility]    Progress Note    Antoni Zuniga Patient Status:  Inpatient    1950 MRN [id number]   Location [facility] 2W/SW Attending Richar De Oliveira MD   Hosp Day # 3 PCP Meliton Fray Romana Eden, DO        Subjective: 02/07/2021     02/07/2021    K 3.5 02/07/2021     02/07/2021    CO2 29.0 02/07/2021     (H) 02/07/2021    CA 8.6 02/07/2021    ALB 3.9 02/03/2021    ALKPHO 85 02/03/2021    BILT 1.4 02/03/2021    TP 7.9 02/03/2021    AST 21 02/03/2021

## 2021-02-07 NOTE — PROGRESS NOTES
[facility]    Progress Note    Allyssaquincy Jaquez Patient Status:  Inpatient    1950 MRN [id number]   Location [facility] 2W/SW Attending Desire Claros MD   Hosp Day # 3 PCP Tressia Elizabeth Griselda Puller,        Subjective:   Marely Pena Psychiatric: calm  Sternum Incision dressing C/D/I; Left leg incision COLLINS C/D/I  Pulmonary:  clear to auscultation bilaterally  diminished RLL  Cardiovascular: S1, S2 normal, no murmur, click, rub or gallop, regular rate and rhythm  Abdominal: soft, non- (cpt=71046)    Result Date: 2/7/2021  CONCLUSION:  1. Postoperative changes again noted from a recent CABG. Lines/tubes have been removed. No pneumothorax. 2. A moderate left pleural effusion is unchanged; a trace right pleural effusion is new.   These fi

## 2021-02-07 NOTE — PLAN OF CARE
Problem: Patient Centered Care  Goal: Patient preferences are identified and integrated in the patient's plan of care  Description: Interventions:  - What would you like us to know as we care for you?   - Provide timely, complete, and accurate informatio for assistance with activity based on assessment  - Modify environment to reduce risk of injury  - Provide assistive devices as appropriate  - Consider OT/PT consult to assist with strengthening/mobility  - Encourage toileting schedule  Outcome: Progressin bleeding and/or hematoma  - Assess quality of pulses, skin color and temperature  - Assess for signs of decreased coronary artery perfusion - ex.  Angina  - Evaluate fluid balance, assess for edema, trend weights  Outcome: Progressing  Goal: Absence of card maintained within prescribed range  Description: INTERVENTIONS:  - Monitor Blood Glucose as ordered  - Assess for signs and symptoms of hyperglycemia and hypoglycemia  - Administer ordered medications to maintain glucose within target range  - Assess barri Progressing     Problem: HEMATOLOGIC - ADULT  Goal: Maintains hematologic stability  Description: INTERVENTIONS  - Assess for signs and symptoms of bleeding or hemorrhage  - Monitor labs and vital signs for trends  - Administer supportive blood products/fa

## 2021-02-07 NOTE — PLAN OF CARE
Problem: Patient Centered Care  Goal: Patient preferences are identified and integrated in the patient's plan of care  Description: Interventions:  - What would you like us to know as we care for you?   - Provide timely, complete, and accurate informatio for assistance with activity based on assessment  - Modify environment to reduce risk of injury  - Provide assistive devices as appropriate  - Consider OT/PT consult to assist with strengthening/mobility  - Encourage toileting schedule  Outcome: Progressin bleeding and/or hematoma  - Assess quality of pulses, skin color and temperature  - Assess for signs of decreased coronary artery perfusion - ex.  Angina  - Evaluate fluid balance, assess for edema, trend weights  Outcome: Progressing  Goal: Absence of card maintained within prescribed range  Description: INTERVENTIONS:  - Monitor Blood Glucose as ordered  - Assess for signs and symptoms of hyperglycemia and hypoglycemia  - Administer ordered medications to maintain glucose within target range  - Assess barri Progressing     Problem: HEMATOLOGIC - ADULT  Goal: Maintains hematologic stability  Description: INTERVENTIONS  - Assess for signs and symptoms of bleeding or hemorrhage  - Monitor labs and vital signs for trends  - Administer supportive blood products/fa tooth brush  - Limit straining and forceful nose blowing  Outcome: Progressing    Pt A&O x4 , up in chair on pms, partner [address] visiting, pt is pleasant and cooperative, up to br several times for bm but only flatus, voided in toilet, wants to sleep in [place]

## 2021-02-07 NOTE — PROGRESS NOTES
[place] [facility]    Progress Note    Paddy Milder Patient Status:  Inpatient    1950 MRN [id number]   Location [facility] 2W/SW Attending Tracy Lloyd MD   Hosp Day # 3 PCP Celeste Hernandez DO       Pertinent chart, MG 2.6 02/05/2021       Xr Chest Pa + Lat Chest (cpt=71046)    Result Date: 2/7/2021  CONCLUSION:  1. Postoperative changes again noted from a recent CABG. Lines/tubes have been removed. No pneumothorax.  2. A moderate left pleural effusion is unchange

## 2021-02-08 VITALS
BODY MASS INDEX: 37.87 KG/M2 | TEMPERATURE: 98 F | SYSTOLIC BLOOD PRESSURE: 108 MMHG | HEIGHT: 66 IN | RESPIRATION RATE: 19 BRPM | DIASTOLIC BLOOD PRESSURE: 64 MMHG | OXYGEN SATURATION: 96 % | HEART RATE: 97 BPM | WEIGHT: 235.63 LBS

## 2021-02-08 LAB
BASOPHILS # BLD AUTO: 0.03 X10(3) UL (ref 0–0.2)
BASOPHILS NFR BLD AUTO: 0.2 %
DEPRECATED RDW RBC AUTO: 42.6 FL (ref 35.1–46.3)
EOSINOPHIL # BLD AUTO: 0.21 X10(3) UL (ref 0–0.7)
EOSINOPHIL NFR BLD AUTO: 1.3 %
ERYTHROCYTE [DISTWIDTH] IN BLOOD BY AUTOMATED COUNT: 13.2 % (ref 11–15)
GLUCOSE BLDC GLUCOMTR-MCNC: 106 MG/DL (ref 70–99)
GLUCOSE BLDC GLUCOMTR-MCNC: 125 MG/DL (ref 70–99)
HAV IGM SER QL: 2.4 MG/DL (ref 1.6–2.6)
HCT VFR BLD AUTO: 38.5 %
HGB BLD-MCNC: 12.7 G/DL
IMM GRANULOCYTES # BLD AUTO: 0.07 X10(3) UL (ref 0–1)
IMM GRANULOCYTES NFR BLD: 0.4 %
LYMPHOCYTES # BLD AUTO: 1.45 X10(3) UL (ref 1–4)
LYMPHOCYTES NFR BLD AUTO: 9.2 %
MCH RBC QN AUTO: 29.1 PG (ref 26–34)
MCHC RBC AUTO-ENTMCNC: 33 G/DL (ref 31–37)
MCV RBC AUTO: 88.1 FL
MONOCYTES # BLD AUTO: 1.26 X10(3) UL (ref 0.1–1)
MONOCYTES NFR BLD AUTO: 8 %
NEUTROPHILS # BLD AUTO: 12.71 X10 (3) UL (ref 1.5–7.7)
NEUTROPHILS # BLD AUTO: 12.71 X10(3) UL (ref 1.5–7.7)
NEUTROPHILS NFR BLD AUTO: 80.9 %
PLATELET # BLD AUTO: 230 10(3)UL (ref 150–450)
RBC # BLD AUTO: 4.37 X10(6)UL
WBC # BLD AUTO: 15.7 X10(3) UL (ref 4–11)

## 2021-02-08 PROCEDURE — 99238 HOSP IP/OBS DSCHRG MGMT 30/<: CPT | Performed by: HOSPITALIST

## 2021-02-08 PROCEDURE — 99232 SBSQ HOSP IP/OBS MODERATE 35: CPT | Performed by: INTERNAL MEDICINE

## 2021-02-08 PROCEDURE — 99232 SBSQ HOSP IP/OBS MODERATE 35: CPT | Performed by: NURSE PRACTITIONER

## 2021-02-08 RX ORDER — TESTOSTERONE CYPIONATE 200 MG/ML
200 INJECTION INTRAMUSCULAR
Qty: 6 ML | Refills: 0 | Status: ON HOLD | OUTPATIENT
Start: 2021-02-08 | End: 2021-02-22

## 2021-02-08 RX ORDER — POTASSIUM CHLORIDE 20 MEQ/1
20 TABLET, EXTENDED RELEASE ORAL ONCE
Status: COMPLETED | OUTPATIENT
Start: 2021-02-08 | End: 2021-02-08

## 2021-02-08 RX ORDER — HYDROCODONE BITARTRATE AND ACETAMINOPHEN 5; 325 MG/1; MG/1
2 TABLET ORAL EVERY 4 HOURS PRN
Qty: 30 TABLET | Refills: 0 | Status: SHIPPED | OUTPATIENT
Start: 2021-02-08 | End: 2021-05-28

## 2021-02-08 RX ORDER — AMMONIUM LACTATE 12 G/100G
1 LOTION TOPICAL AS NEEDED
Status: SHIPPED | COMMUNITY
Start: 2021-02-08 | End: 2021-05-28

## 2021-02-08 RX ORDER — POTASSIUM CHLORIDE 20 MEQ/1
20 TABLET, EXTENDED RELEASE ORAL DAILY
Qty: 3 TABLET | Refills: 0 | Status: SHIPPED | OUTPATIENT
Start: 2021-02-08 | End: 2021-02-11

## 2021-02-08 RX ORDER — METHOCARBAMOL 500 MG/1
500 TABLET, FILM COATED ORAL 4 TIMES DAILY PRN
Refills: 0 | Status: SHIPPED | COMMUNITY
Start: 2021-02-08 | End: 2021-05-28

## 2021-02-08 RX ORDER — ASCORBIC ACID 500 MG
500 TABLET ORAL 3 TIMES DAILY
Qty: 90 TABLET | Refills: 0 | Status: SHIPPED | OUTPATIENT
Start: 2021-02-08 | End: 2021-05-28

## 2021-02-08 RX ORDER — FUROSEMIDE 20 MG/1
20 TABLET ORAL 2 TIMES DAILY
Qty: 6 TABLET | Refills: 0 | Status: SHIPPED | OUTPATIENT
Start: 2021-02-08 | End: 2021-02-11

## 2021-02-08 RX ORDER — POLYETHYLENE GLYCOL 3350 17 G/17G
17 POWDER, FOR SOLUTION ORAL DAILY PRN
Refills: 0 | Status: SHIPPED | COMMUNITY
Start: 2021-02-08 | End: 2021-05-28

## 2021-02-08 RX ORDER — METOPROLOL TARTRATE 100 MG/1
100 TABLET ORAL
Qty: 60 TABLET | Refills: 0 | Status: SHIPPED | OUTPATIENT
Start: 2021-02-08 | End: 2021-03-08

## 2021-02-08 RX ORDER — METOPROLOL TARTRATE 100 MG/1
100 TABLET ORAL
Status: DISCONTINUED | OUTPATIENT
Start: 2021-02-08 | End: 2021-02-08

## 2021-02-08 RX ORDER — FUROSEMIDE 10 MG/ML
20 INJECTION INTRAMUSCULAR; INTRAVENOUS ONCE
Status: COMPLETED | OUTPATIENT
Start: 2021-02-08 | End: 2021-02-08

## 2021-02-08 RX ORDER — ATORVASTATIN CALCIUM 40 MG/1
40 TABLET, FILM COATED ORAL NIGHTLY
Qty: 30 TABLET | Refills: 5 | Status: SHIPPED | OUTPATIENT
Start: 2021-02-08 | End: 2021-05-13

## 2021-02-08 RX ORDER — CLOPIDOGREL BISULFATE 75 MG/1
75 TABLET ORAL DAILY
Qty: 90 TABLET | Refills: 0 | Status: SHIPPED | OUTPATIENT
Start: 2021-02-09 | End: 2021-03-08

## 2021-02-08 NOTE — CM/SW NOTE
02/08/21 1100   Discharge disposition   Expected discharge disposition Home-Health   Name of Facillity/Home Care/Hospice   [facility]   Discharge transportation Private car     [facility]   Met with patient at t

## 2021-02-08 NOTE — PROGRESS NOTES
[place] [facility] - [facility]    Progress Note    Latia Mae Patient Status:  Inpatient    1950 MRN [id number]   Location [facility] 2W/SW Attending Grisel Cosme MD   Hosp Day # 4 PCP Juan Ramon Whalen DO     Subjective:  Pt. to pt. Regarding recovery care including incision care  Will review [place]  prior to narcotic prescription written    Addendum @ 11:30am  Wgt slightly above pre op; trending downward post op. + LE edema noted with mod left pleural effusion noted on CXR yest

## 2021-02-08 NOTE — PROGRESS NOTES
[facility] - [facility]    Cardiology Progress Note    Nadine London Patient Status:  Inpatient    1950 MRN [id number]   Location [facility] 2W/SW Attending Sultana Cobos MD   Hosp Day # 4 PCP Maurine Bloch,      2021    Genet JVD, carotids 2+, no bruits. Cardiac: Regular rate and rhythm. S1, S2 normal. No murmur, pericardial rub, S3.  Lungs: Clear without wheezes, rales, rhonchi. Normal excursions and effort. Abdomen: Soft, non-tender. BS-present.   Extremities: Without clubb flexpen 1-5 Units, 1-5 Units, Subcutaneous, TID CC    •  acetaminophen (TYLENOL) tab 650 mg, 650 mg, Oral, Q6H PRN    Or    •  acetaminophen (TYLENOL) 160 MG/5ML oral liquid 650 mg, 650 mg, Oral, Q6H PRN    Or    •  acetaminophen (TYLENOL) 650 MG rectal lowry HYDROcodone-acetaminophen (NORCO) 5-325 MG per tab 1 tablet, 1 tablet, Oral, Q4H PRN    Or    •  HYDROcodone-acetaminophen (NORCO) 5-325 MG per tab 2 tablet, 2 tablet, Oral, Q4H PRN    •  morphINE sulfate (PF) 4 MG/ML injection 2 mg, 2 mg, Intravenous, Q2H

## 2021-02-08 NOTE — PHYSICAL THERAPY NOTE
PHYSICAL THERAPY TREATMENT NOTE - INPATIENT     Room Number: 394/434-E       Presenting Problem: CABG x 5 on 2/4    Problem List  Active Problems:    Coronary artery disease involving native coronary artery of native heart    Atherosclerosis of native cristine Static Sitting: Good  Dynamic Sitting: Good           Static Standing: Good  Dynamic Standing: Fair -    AM-PAC '6-Clicks' INPATIENT SHORT F Current Status 200ft with rolling walker SBA   Goal #4 Patient will negotiate 6 stairs/one curb w/ assistive device and supervision   Goal #4   Current Status Not tested    Goal #5 Patient to demonstrate independence with home activity/exercise instructi

## 2021-02-08 NOTE — PLAN OF CARE
Problem: Patient Centered Care  Goal: Patient preferences are identified and integrated in the patient's plan of care  Description: Interventions:  - What would you like us to know as we care for you?     - Provide timely, complete, and accurate informat call for assistance with activity based on assessment  - Modify environment to reduce risk of injury  - Provide assistive devices as appropriate  - Consider OT/PT consult to assist with strengthening/mobility  - Encourage toileting schedule  Outcome: Progr sites for bleeding and/or hematoma  - Assess quality of pulses, skin color and temperature  - Assess for signs of decreased coronary artery perfusion - ex.  Angina  - Evaluate fluid balance, assess for edema, trend weights  Outcome: Progressing  Goal: Absen Glucose maintained within prescribed range  Description: INTERVENTIONS:  - Monitor Blood Glucose as ordered  - Assess for signs and symptoms of hyperglycemia and hypoglycemia  - Administer ordered medications to maintain glucose within target range  - Asse Progressing     Problem: HEMATOLOGIC - ADULT  Goal: Maintains hematologic stability  Description: INTERVENTIONS  - Assess for signs and symptoms of bleeding or hemorrhage  - Monitor labs and vital signs for trends  - Administer supportive blood products/fa

## 2021-02-08 NOTE — OCCUPATIONAL THERAPY NOTE
OCCUPATIONAL THERAPY TREATMENT NOTE - INPATIENT    Room Number: 598/606-L         Presenting Problem: Pt s/p CABGx5 2/4/2021     Problem List  Active Problems:    Coronary artery disease involving native coronary artery of native heart    Atherosclerosis o using toilet, bedpan or urinal? : A Little  -   Putting on and taking off regular upper body clothing?: A Little  -   Taking care of personal grooming such as brushing teeth?: A Little  -   Eating meals?: None    AM-PAC Score:  Score: 19  Approx Degree of

## 2021-02-08 NOTE — CM/SW NOTE
BPCI Bundled Advanced Medicare Program    Plan of care reviewed for care coordination and discharge planning. Noted pt falls under  BPCI/Medicare program, with DRG Coronary Artery Bypass Graft ([address])    [address] Proposal:  Home Health.   Pt to discharge

## 2021-02-08 NOTE — PROGRESS NOTES
Pulmonary/Critical Care Follow Up Note    HPI:   Arthuro Gallop is a 69year old male with No chief complaint on file.       PCP Moe Mckenzie DO  Admission Attending Florencio Valenzuela MD    Hospital Day #4    Mild pain  No sob  Ready for shower and home g, Oral, Q15 Min PRN **OR** Glucose-Vitamin C (DEX-4) chewable tab 4 tablet, 4 tablet, Oral, Q15 Min PRN **OR** dextrose 50 % injection 50 mL, 50 mL, Intravenous, Q15 Min PRN **OR** glucose (DEX4) oral liquid 30 g, 30 g, Oral, Q15 Min PRN **OR** Glucose-Vi 8.6 mg, 8.6 mg, Oral, BID  •  Heparin Sodium (Porcine) 5000 UNIT/ML injection 5,000 Units, 5,000 Units, Subcutaneous, Q12H [facility]      Allergies:    Seasonal                        PHYSICAL EXAM:   Blood pressure 154/90, pulse 105, temperature 98 °F (36.7 °C)

## 2021-02-08 NOTE — CARDIAC REHAB
Cardiac Rehab Phase I    Activity:   Chair: Yes   Ambulation: Yes   Assistive Device: Walker   Distance: 200 feet   Assistance needed: No   Patient tolerated activity: Well. Shower Date: 2/8/21 Tolerated Shower Activity Well.     Education:  Rishabh Gardner

## 2021-02-08 NOTE — PLAN OF CARE
Adriana Clintvikas showered today and was educated on post-op wound care and mobility. AVS reviewed with Adriana Adkins and his partner. No further questions at this time. He left via personal vehicle with all belongings and in no s/s of distress.      Problem: Patient Centered cognitive and physical deficits and behaviors that affect risk of falls.   - [place] fall precautions as indicated by assessment.  - Educate pt/family on patient safety including physical limitations  - Instruct pt to call for assistance with activity bas trends  - Monitor for bleeding, hypotension and signs of decreased cardiac output  - Evaluate effectiveness of vasoactive medications to optimize hemodynamic stability  - Monitor arterial and/or venous puncture sites for bleeding and/or hematoma  - Assess needed  - Establish a toileting routine/schedule  - Consider collaborating with pharmacy to review patient's medication profile  Outcome: Completed     Problem: METABOLIC/FLUID AND ELECTROLYTES - ADULT  Goal: Glucose maintained within prescribed range  Yayo dressing/incision, wound bed, drain sites and surrounding tissue  - Implement wound care per orders  - Initiate isolation precautions as appropriate  - Initiate Pressure Ulcer prevention bundle as indicated  Outcome: Completed     Problem: HEMATOLOGIC - AD

## 2021-02-09 ENCOUNTER — PATIENT OUTREACH (OUTPATIENT)
Dept: CASE MANAGEMENT | Age: 71
End: 2021-02-09

## 2021-02-09 DIAGNOSIS — I25.119 ATHEROSCLEROSIS OF NATIVE CORONARY ARTERY OF NATIVE HEART WITH ANGINA PECTORIS (HCC): ICD-10-CM

## 2021-02-09 DIAGNOSIS — I10 HYPERTENSION, BENIGN: ICD-10-CM

## 2021-02-09 DIAGNOSIS — E78.00 HYPERCHOLESTEROLEMIA: ICD-10-CM

## 2021-02-09 DIAGNOSIS — Z02.9 ENCOUNTERS FOR UNSPECIFIED ADMINISTRATIVE PURPOSE: ICD-10-CM

## 2021-02-09 DIAGNOSIS — I25.10 CORONARY ARTERY DISEASE INVOLVING NATIVE CORONARY ARTERY OF NATIVE HEART WITHOUT ANGINA PECTORIS: Chronic | ICD-10-CM

## 2021-02-09 PROCEDURE — 1111F DSCHRG MED/CURRENT MED MERGE: CPT

## 2021-02-09 NOTE — PROGRESS NOTES
Initial Post Discharge Follow Up   Discharge Date: 2/8/21  Contact Date: 2/9/2021    Consent Verification:  Assessment Completed With: Patient  HIPAA Verified? Yes    Discharge Dx:      Atherosclerosis of native coronary artery of native heart with ulices your discharge instructions reviewed with you? yes  • How well were your discharge instructions explained to you?   o On a scale of 1-5   1- Very Poor and 5- Very well   o Very well  • Do you have any questions about your discharge instructions?   No  • Bef 1 tablet (20 mg total) by mouth 2 (two) times daily for 3 days. 6 tablet 0   • Potassium Chloride ER 20 MEQ Oral Tab CR Take 1 tablet (20 mEq total) by mouth daily for 3 days.  3 tablet 0   • Testosterone cypionate 200 MG/ML Intramuscular Solution Inject 1 had not heard from [facility] as yet. NCESTELLA placed a call to [facility] to make sure that they are aware that patient was discharged home last night. NELSON was informed that the nurse will be calling the patient to set up a time to go out to the patient house. [phone]  [phone]          PCP TCM/HFU appointment: scheduled at D/C within 7-14 days no     NCM Reviewed/scheduled/rescheduled PCP TCM/HFU appointment with pt:  Yes, NCM confirmed patient had a HFU scheduled on 3/1/2021, NCM rescheduled to a TCM HF

## 2021-02-12 ENCOUNTER — OFFICE VISIT (OUTPATIENT)
Dept: CARDIOLOGY CLINIC | Facility: CLINIC | Age: 71
End: 2021-02-12
Payer: MEDICARE

## 2021-02-12 VITALS
BODY MASS INDEX: 37.77 KG/M2 | SYSTOLIC BLOOD PRESSURE: 124 MMHG | HEART RATE: 86 BPM | DIASTOLIC BLOOD PRESSURE: 75 MMHG | WEIGHT: 235 LBS | OXYGEN SATURATION: 95 % | HEIGHT: 66 IN | RESPIRATION RATE: 18 BRPM

## 2021-02-12 DIAGNOSIS — I25.10 CORONARY ARTERY DISEASE INVOLVING NATIVE CORONARY ARTERY OF NATIVE HEART WITHOUT ANGINA PECTORIS: Primary | ICD-10-CM

## 2021-02-12 PROCEDURE — 99214 OFFICE O/P EST MOD 30 MIN: CPT | Performed by: NURSE PRACTITIONER

## 2021-02-12 RX ORDER — POTASSIUM CHLORIDE 20 MEQ/1
20 TABLET, EXTENDED RELEASE ORAL 2 TIMES DAILY
COMMUNITY
End: 2021-05-28

## 2021-02-12 RX ORDER — FUROSEMIDE 20 MG/1
20 TABLET ORAL 2 TIMES DAILY
COMMUNITY
End: 2021-05-28

## 2021-02-12 NOTE — PROGRESS NOTES
[facility] is a 69year old male. Patient presents with: Follow - Up  Cardiomyopathy  CAD: CABG X5    HPI:   Patient comes in  today for hospital follow-up .  He sees Dr. Sean Lyman he was initially seen for abnormal echocardiogram which showed some mild LV total) by mouth 2x Daily(Beta Blocker). Do not take if BP less than 100, heart rate less than 60 60 tablet 0   • aspirin 81 MG Oral Tab EC Take 1 tablet (81 mg total) by mouth daily.  30 tablet 3   • Calcium Carbonate-Vitamin D (CALCIUM 600 + D OR) Take by tobacco: Never Used    Alcohol use: Yes      Frequency: 2-4 times a month      Comment: wine, 1 glass, occasionally    Drug use: No       REVIEW OF SYSTEMS:   GENERAL HEALTH: feels well otherwise  SKIN: denies any unusual skin lesions or rashes  RESPIRATOR

## 2021-02-15 ENCOUNTER — TELEPHONE (OUTPATIENT)
Dept: FAMILY MEDICINE CLINIC | Facility: CLINIC | Age: 71
End: 2021-02-15

## 2021-02-15 ENCOUNTER — TELEPHONE (OUTPATIENT)
Dept: CASE MANAGEMENT | Facility: HOSPITAL | Age: 71
End: 2021-02-15

## 2021-02-15 NOTE — DISCHARGE SUMMARY
[facility] HOSPITALIST  DISCHARGE SUMMARY     [facility] Patient Status:  Inpatient    1950 MRN [id number]   Location [facility] 2W/SW Attending No att. providers found   Hosp Day # 4 PCP Amber Ambrose,      Date of Admission:  Bisulfate 75 MG Tabs  Commonly known as: Plavix      Take 1 tablet (75 mg total) by mouth daily.    Quantity: 90 tablet  Refills: 0     HYDROcodone-acetaminophen 5-325 MG Tabs  Commonly known as: NORCO      Take 2 tablets by mouth every 4 (four) hours as ne TAKE 1/2 TABLET AS NEEDED   Quantity: 30 tablet  Refills: 2     Omeprazole 40 MG Cpdr      TAKE 1 CAPSULE BY MOUTH  DAILY   Quantity: 90 capsule  Refills: 3     THERA/BETA-CAROTENE Tabs      Take  by mouth.  take 1 tablet by oral route  every day with fo medications  · atorvastatin 40 MG Tabs  · Metoprolol Tartrate 100 MG Tabs  · Testosterone cypionate 200 MG/ML Abner         Follow-up appointment:   Jose Delgadillo DO  [facility] [address] [address] [phone]    On 3/1/2021  Hospit

## 2021-02-15 NOTE — TELEPHONE ENCOUNTER
Doc see message below. Right now we only have a 420 on next Tuesday 2/23. Would you like to offer a add on or is this ok?

## 2021-02-15 NOTE — TELEPHONE ENCOUNTER
Pt had an appointment today, but, cancelled due to the weather. This was for follow up after hospital discharge. The next available is not until 4-2-21. Pt would like to be seen before that date. Dr. Jody Rascon, when would you like to see the patient.

## 2021-02-16 NOTE — TELEPHONE ENCOUNTER
Spoke, with the patient and scheduled an appointment for 2-23-21 at 4:20 ok 'd per the doctor below.

## 2021-02-22 ENCOUNTER — APPOINTMENT (OUTPATIENT)
Dept: ULTRASOUND IMAGING | Facility: HOSPITAL | Age: 71
End: 2021-02-22
Attending: EMERGENCY MEDICINE
Payer: MEDICARE

## 2021-02-22 ENCOUNTER — HOSPITAL ENCOUNTER (OUTPATIENT)
Facility: HOSPITAL | Age: 71
Setting detail: OBSERVATION
Discharge: HOME | End: 2021-02-23
Attending: EMERGENCY MEDICINE | Admitting: HOSPITALIST
Payer: MEDICARE

## 2021-02-22 ENCOUNTER — APPOINTMENT (OUTPATIENT)
Dept: GENERAL RADIOLOGY | Facility: HOSPITAL | Age: 71
End: 2021-02-22
Attending: EMERGENCY MEDICINE
Payer: MEDICARE

## 2021-02-22 ENCOUNTER — APPOINTMENT (OUTPATIENT)
Dept: CT IMAGING | Facility: HOSPITAL | Age: 71
End: 2021-02-22
Attending: EMERGENCY MEDICINE
Payer: MEDICARE

## 2021-02-22 DIAGNOSIS — R06.02 SHORTNESS OF BREATH: Primary | ICD-10-CM

## 2021-02-22 DIAGNOSIS — R77.8 ELEVATED TROPONIN: ICD-10-CM

## 2021-02-22 DIAGNOSIS — J90 PLEURAL EFFUSION ON LEFT: ICD-10-CM

## 2021-02-22 DIAGNOSIS — L03.116 LEFT LEG CELLULITIS: ICD-10-CM

## 2021-02-22 PROBLEM — R79.89 ELEVATED TROPONIN: Status: ACTIVE | Noted: 2021-02-22

## 2021-02-22 LAB
ANION GAP SERPL CALC-SCNC: 5 MMOL/L (ref 0–18)
BASOPHILS # BLD AUTO: 0.03 X10(3) UL (ref 0–0.2)
BASOPHILS NFR BLD AUTO: 0.3 %
BUN BLD-MCNC: 24 MG/DL (ref 7–18)
BUN/CREAT SERPL: 17.1 (ref 10–20)
CALCIUM BLD-MCNC: 8.5 MG/DL (ref 8.5–10.1)
CHLORIDE SERPL-SCNC: 106 MMOL/L (ref 98–112)
CHOLEST SMN-MCNC: 141 MG/DL (ref ?–200)
CO2 SERPL-SCNC: 28 MMOL/L (ref 21–32)
CREAT BLD-MCNC: 1.4 MG/DL
D DIMER PPP FEU-MCNC: 8.51 UG/ML FEU (ref ?–0.71)
DEPRECATED RDW RBC AUTO: 40.4 FL (ref 35.1–46.3)
EOSINOPHIL # BLD AUTO: 0.23 X10(3) UL (ref 0–0.7)
EOSINOPHIL NFR BLD AUTO: 2.2 %
ERYTHROCYTE [DISTWIDTH] IN BLOOD BY AUTOMATED COUNT: 12.7 % (ref 11–15)
GLUCOSE BLD-MCNC: 116 MG/DL (ref 70–99)
HAV IGM SER QL: 2 MG/DL (ref 1.6–2.6)
HAV IGM SER QL: 2.2 MG/DL (ref 1.6–2.6)
HCT VFR BLD AUTO: 39.6 %
HDLC SERPL-MCNC: 35 MG/DL (ref 40–59)
HGB BLD-MCNC: 13.1 G/DL
IMM GRANULOCYTES # BLD AUTO: 0.03 X10(3) UL (ref 0–1)
IMM GRANULOCYTES NFR BLD: 0.3 %
LDLC SERPL CALC-MCNC: 80 MG/DL (ref ?–100)
LYMPHOCYTES # BLD AUTO: 1.74 X10(3) UL (ref 1–4)
LYMPHOCYTES NFR BLD AUTO: 16.7 %
MCH RBC QN AUTO: 28.8 PG (ref 26–34)
MCHC RBC AUTO-ENTMCNC: 33.1 G/DL (ref 31–37)
MCV RBC AUTO: 87 FL
MONOCYTES # BLD AUTO: 0.92 X10(3) UL (ref 0.1–1)
MONOCYTES NFR BLD AUTO: 8.8 %
NEUTROPHILS # BLD AUTO: 7.5 X10 (3) UL (ref 1.5–7.7)
NEUTROPHILS # BLD AUTO: 7.5 X10(3) UL (ref 1.5–7.7)
NEUTROPHILS NFR BLD AUTO: 71.7 %
NONHDLC SERPL-MCNC: 106 MG/DL (ref ?–130)
NT-PROBNP SERPL-MCNC: 2367 PG/ML (ref ?–125)
OSMOLALITY SERPL CALC.SUM OF ELEC: 293 MOSM/KG (ref 275–295)
PLATELET # BLD AUTO: 367 10(3)UL (ref 150–450)
POTASSIUM SERPL-SCNC: 4.1 MMOL/L (ref 3.5–5.1)
PROCALCITONIN SERPL-MCNC: 0.03 NG/ML (ref ?–0.16)
RBC # BLD AUTO: 4.55 X10(6)UL
SARS-COV-2 RNA RESP QL NAA+PROBE: NOT DETECTED
SODIUM SERPL-SCNC: 139 MMOL/L (ref 136–145)
TRIGL SERPL-MCNC: 130 MG/DL (ref 30–149)
TROPONIN I SERPL-MCNC: 0.06 NG/ML (ref ?–0.04)
TROPONIN I SERPL-MCNC: 0.07 NG/ML (ref ?–0.04)
TROPONIN I SERPL-MCNC: 0.07 NG/ML (ref ?–0.04)
VLDLC SERPL CALC-MCNC: 26 MG/DL (ref 0–30)
WBC # BLD AUTO: 10.5 X10(3) UL (ref 4–11)

## 2021-02-22 PROCEDURE — 99214 OFFICE O/P EST MOD 30 MIN: CPT | Performed by: INTERNAL MEDICINE

## 2021-02-22 PROCEDURE — 71260 CT THORAX DX C+: CPT | Performed by: EMERGENCY MEDICINE

## 2021-02-22 PROCEDURE — 99220 INITIAL OBSERVATION CARE,LEVL III: CPT | Performed by: INTERNAL MEDICINE

## 2021-02-22 PROCEDURE — 71045 X-RAY EXAM CHEST 1 VIEW: CPT | Performed by: EMERGENCY MEDICINE

## 2021-02-22 PROCEDURE — 93971 EXTREMITY STUDY: CPT | Performed by: EMERGENCY MEDICINE

## 2021-02-22 RX ORDER — VANCOMYCIN HYDROCHLORIDE
1500 EVERY 24 HOURS
Status: DISCONTINUED | OUTPATIENT
Start: 2021-02-23 | End: 2021-02-23

## 2021-02-22 RX ORDER — NITROGLYCERIN 0.4 MG/1
0.4 TABLET SUBLINGUAL EVERY 5 MIN PRN
Status: DISCONTINUED | OUTPATIENT
Start: 2021-02-22 | End: 2021-02-23

## 2021-02-22 RX ORDER — FUROSEMIDE 10 MG/ML
40 INJECTION INTRAMUSCULAR; INTRAVENOUS
Status: DISCONTINUED | OUTPATIENT
Start: 2021-02-22 | End: 2021-02-22

## 2021-02-22 RX ORDER — ACETAMINOPHEN 325 MG/1
650 TABLET ORAL EVERY 6 HOURS PRN
Status: DISCONTINUED | OUTPATIENT
Start: 2021-02-22 | End: 2021-02-23

## 2021-02-22 RX ORDER — ASPIRIN 81 MG/1
81 TABLET ORAL DAILY
Status: DISCONTINUED | OUTPATIENT
Start: 2021-02-23 | End: 2021-02-23

## 2021-02-22 RX ORDER — PANTOPRAZOLE SODIUM 40 MG/1
40 TABLET, DELAYED RELEASE ORAL
Refills: 3 | Status: DISCONTINUED | OUTPATIENT
Start: 2021-02-22 | End: 2021-02-23

## 2021-02-22 RX ORDER — SODIUM CHLORIDE 0.9 % (FLUSH) 0.9 %
10 SYRINGE (ML) INJECTION AS NEEDED
Status: DISCONTINUED | OUTPATIENT
Start: 2021-02-22 | End: 2021-02-23

## 2021-02-22 RX ORDER — HYDROCODONE BITARTRATE AND ACETAMINOPHEN 5; 325 MG/1; MG/1
2 TABLET ORAL EVERY 6 HOURS PRN
Status: DISCONTINUED | OUTPATIENT
Start: 2021-02-22 | End: 2021-02-23

## 2021-02-22 RX ORDER — DIAZEPAM 2 MG/1
1 TABLET ORAL EVERY 12 HOURS PRN
Status: DISCONTINUED | OUTPATIENT
Start: 2021-02-22 | End: 2021-02-23

## 2021-02-22 RX ORDER — METHOCARBAMOL 500 MG/1
500 TABLET, FILM COATED ORAL 4 TIMES DAILY PRN
Status: DISCONTINUED | OUTPATIENT
Start: 2021-02-22 | End: 2021-02-23

## 2021-02-22 RX ORDER — CLOPIDOGREL BISULFATE 75 MG/1
75 TABLET ORAL DAILY
Status: DISCONTINUED | OUTPATIENT
Start: 2021-02-22 | End: 2021-02-23

## 2021-02-22 RX ORDER — ASPIRIN 81 MG/1
324 TABLET, CHEWABLE ORAL ONCE
Status: COMPLETED | OUTPATIENT
Start: 2021-02-22 | End: 2021-02-22

## 2021-02-22 RX ORDER — FUROSEMIDE 10 MG/ML
40 INJECTION INTRAMUSCULAR; INTRAVENOUS DAILY
Status: DISCONTINUED | OUTPATIENT
Start: 2021-02-23 | End: 2021-02-23

## 2021-02-22 RX ORDER — NITROGLYCERIN 0.4 MG/1
0.4 TABLET SUBLINGUAL EVERY 5 MIN PRN
Status: DISCONTINUED | OUTPATIENT
Start: 2021-02-22 | End: 2021-02-22

## 2021-02-22 RX ORDER — VANCOMYCIN 2 GRAM/500 ML IN 0.9 % SODIUM CHLORIDE INTRAVENOUS
25 ONCE
Status: COMPLETED | OUTPATIENT
Start: 2021-02-22 | End: 2021-02-22

## 2021-02-22 RX ORDER — ATORVASTATIN CALCIUM 40 MG/1
40 TABLET, FILM COATED ORAL NIGHTLY
Status: DISCONTINUED | OUTPATIENT
Start: 2021-02-22 | End: 2021-02-23

## 2021-02-22 RX ORDER — HEPARIN SODIUM 5000 [USP'U]/ML
5000 INJECTION, SOLUTION INTRAVENOUS; SUBCUTANEOUS EVERY 8 HOURS SCHEDULED
Status: DISCONTINUED | OUTPATIENT
Start: 2021-02-22 | End: 2021-02-23

## 2021-02-22 RX ORDER — METOPROLOL TARTRATE 100 MG/1
100 TABLET ORAL
Status: DISCONTINUED | OUTPATIENT
Start: 2021-02-22 | End: 2021-02-23

## 2021-02-22 RX ORDER — ASCORBIC ACID 500 MG
500 TABLET ORAL 3 TIMES DAILY
Status: DISCONTINUED | OUTPATIENT
Start: 2021-02-22 | End: 2021-02-23

## 2021-02-22 RX ORDER — CEFAZOLIN SODIUM/WATER 2 G/20 ML
2 SYRINGE (ML) INTRAVENOUS ONCE
Status: COMPLETED | OUTPATIENT
Start: 2021-02-22 | End: 2021-02-22

## 2021-02-22 NOTE — PLAN OF CARE
Problem: Patient Centered Care  Goal: Patient preferences are identified and integrated in the patient's plan of care  Description: Interventions:  - What would you like us to know as we care for you?  I was just here in the ICU for a CABG on Feb 3rd  - P a/o x4. Room air. IV vanco and lasix. SCDs placed on patients right leg only due to cellulitis of left leg. Partner at bedside. No complaints throughout shift. Call light in reach. Alert and oriented to person, place and time

## 2021-02-22 NOTE — RESPIRATORY THERAPY NOTE
NESHA ASSESSMENT:    Pt does not have a previous diagnosis of NESHA. Pt does not routinely use a CPAP device at home.    CPAP INITIATION:    Pt to be placed on CPAP: no

## 2021-02-22 NOTE — PLAN OF CARE
Problem: Patient Centered Care  Goal: Patient preferences are identified and integrated in the patient's plan of care  Description: Interventions:  - What would you like us to know as we care for you?  I was just here in the ICU for a CABG on Feb 3rd  - P admission, BLE swelling, IV antibiotic given in ER.  Patient expressed to this RN that he thinks his shortness of breath at home may have been a slight panic attack, he noticed the redness and swelling of his left leg and thought he might have a blood clot,

## 2021-02-22 NOTE — ED INITIAL ASSESSMENT (HPI)
Pt states he woke up and was feeling short of breath. Hx cabg on 2/3. States he felt he was not getting enough oxygen. Denies chest pain.

## 2021-02-22 NOTE — ED NOTES
Orders for admission, patient is aware of plan and ready to go upstairs. Any questions, please call ED KISHORE Steve  at extension [phone].    Type of COVID test sent:rapid  COVID Suspicion level: Low     Titratable drug(s) infusing:   Rate:    LOC at time of cox

## 2021-02-22 NOTE — PHYSICAL THERAPY NOTE
Orders received and chart reviewed. Discussed with RN Pastor Soto, Dr. Reinier Ying, and patient. Patient admitted OBS for shortness of breath. Per RN and patient, he has been independently ambulating in room with and without walker and denies SOB.  Physical therap

## 2021-02-22 NOTE — H&P
[address] [facility] Patient Status:  Observation      69year old [facility] [id number]   Location 332/332-A Attending Tevin Nava MD     PCP Daphne Emmanuel DO         DATE OF ADMISSION: • HEART CORONARY ARTERY BYPASS GRAFT N/A 2/4/2021    Performed by Courtney Hicks MD at [facility] OR   • HERNIA SURGERY      umbilical   • UPPER GI ENDOSCOPY,EXAM         ALLERGIES   Seasonal    MEDICATIONS  Current Discharge Medication List    CONTINUE thes with food    furosemide 20 MG Oral Tab  Take 20 mg by mouth 2 (two) times daily. Takes in AM and evening     ammonium lactate 12 % External Lotion  Apply 1 Application topically as needed for Dry Skin. Apply to the affected area of skin.   Associated Diagno muscles were intact. PERRL. NECK:  Supple. Trach midline  CHEST:  Symmetrical movement on inspiration, midline incision intact  HEART:  S1 and S2 heard. RRR   LUNGS:  Air entry was good. No crackles or wheezes   ABDOMEN: Soft and non-tender.   Bowel sara on exam  -BNP elevated  -Initially started on IV Lasix  -Holding losartan  - Avoiding Nephrotoxic agents  - Cont to monitor      Shortness of breath  -Resolved  -Possible related to fluid overload  -Adjusting diuresis  -Also some concern for relation to an

## 2021-02-22 NOTE — PROGRESS NOTES
[address] [facility] Dosing Service    Initial Pharmacokinetic Consult for Vancomycin Dosing     Peri Hair is a 69year old patient who is being treated for cellulitis.   Pharmacy has been asked to dose Vancomycin by Dr. Morgan Bonilla:  Seasonal

## 2021-02-22 NOTE — CONSULTS
[address] [facility] Cardiology Consult    Reason for Consultation:  Left lower extremity cellulitis post CABG    History of Present Illness:  Ciera Ariza is a 69year old male who was just discharged February 8th after CABG.   He presents with redne [address] [facility] MAIN OR   • HERNIA SURGERY      umbilical   • UPPER GI ENDOSCOPY,EXAM       Family History   Problem Relation Age of Onset   • Hypertension Father    • Lipids Father    • Lipids Mother    • Hypertension Mother    • Diabetes Mother    • Heart Disease Mo --    .0  --    *  --    BUN 24*  --    CREATSERUM 1.40*  --    GFRAA 58*  --    GFRNAA 50*  --    CA 8.5  --      --    K 4.1  --      --    CO2 28.0  --    TROP 0.062* 0.065*     Assessment:  · Left lower extremity cellulitis im

## 2021-02-22 NOTE — ED PROVIDER NOTES
Patient Seen in: [facility] AND [facility] Emergency Department    History   Patient presents with:  Difficulty Breathing    Stated Complaint: shortness of breath     HPI    34-year-old male with PMH HTN, HL, CAD now s/p CABG x5 on Feb 4th with Feb 1st TTE demonst Take 1 tablet (500 mg total) by mouth 3 (three) times daily. Clopidogrel Bisulfate (PLAVIX) 75 MG Oral Tab,  Take 1 tablet (75 mg total) by mouth daily. atorvastatin 40 MG Oral Tab,  Take 1 tablet (40 mg total) by mouth nightly.    Metoprolol Tartrate 1 Heart Disease Mother         arterial disease   • Hypertension Maternal Grandmother    • Stroke Maternal Grandmother         CVA (stroke)       Social History    Tobacco Use      Smoking status: Never Smoker      Smokeless tobacco: Never Used    Alcohol us normal limits   D-DIMER - Abnormal; Notable for the following components:    D-Dimer 8.51 (*)     All other components within normal limits   LIPID PANEL - Abnormal; Notable for the following components:    HDL Cholesterol 35 (*)     All other components w pleural effusion. Can represent atelectasis or infection. Postsurgical changes of CABG. Aerated lungs are clear. No pneumothorax. Report faxed/transmitted at 02:55 AM ET. To discuss the case, please call . Brandan Apple M.D.   Oanh Li femoral, proximal aspect of the profunda femoral, and popliteal veins demonstrate normal compressibility throughout. Report faxed to the ER and radiology departments and/or is made available via EMR at 2:22 AM [place] time.  Please do not hesitate to c complication. Moderate to large left pleural effusion with adjacent atelectasis. Right lung is clear. No pneumothorax. Probable hepatic cyst in the caudate. Case discussed with Dr. Jocelynn Peralta at 03:49 AM ET. Gena Malik M.D.   This report h encounter with handwashing performed prior and after patient evaluation without personal hand/facial/oropharyngeal contact and gloves worn throughout encounter. See note and/or contact this provider for further PPE details.     Disposition and Plan     Clin

## 2021-02-23 VITALS
RESPIRATION RATE: 16 BRPM | WEIGHT: 222.69 LBS | DIASTOLIC BLOOD PRESSURE: 99 MMHG | SYSTOLIC BLOOD PRESSURE: 150 MMHG | OXYGEN SATURATION: 93 % | BODY MASS INDEX: 36 KG/M2 | HEART RATE: 86 BPM | TEMPERATURE: 98 F

## 2021-02-23 LAB
ANION GAP SERPL CALC-SCNC: 6 MMOL/L (ref 0–18)
BASOPHILS # BLD AUTO: 0.06 X10(3) UL (ref 0–0.2)
BASOPHILS NFR BLD AUTO: 0.7 %
BUN BLD-MCNC: 21 MG/DL (ref 7–18)
BUN/CREAT SERPL: 19.6 (ref 10–20)
CALCIUM BLD-MCNC: 9 MG/DL (ref 8.5–10.1)
CHLORIDE SERPL-SCNC: 106 MMOL/L (ref 98–112)
CO2 SERPL-SCNC: 29 MMOL/L (ref 21–32)
CREAT BLD-MCNC: 1.07 MG/DL
DEPRECATED RDW RBC AUTO: 39.2 FL (ref 35.1–46.3)
EOSINOPHIL # BLD AUTO: 0.21 X10(3) UL (ref 0–0.7)
EOSINOPHIL NFR BLD AUTO: 2.4 %
ERYTHROCYTE [DISTWIDTH] IN BLOOD BY AUTOMATED COUNT: 12.4 % (ref 11–15)
GLUCOSE BLD-MCNC: 99 MG/DL (ref 70–99)
HCT VFR BLD AUTO: 40.7 %
HGB BLD-MCNC: 13.5 G/DL
IMM GRANULOCYTES # BLD AUTO: 0.02 X10(3) UL (ref 0–1)
IMM GRANULOCYTES NFR BLD: 0.2 %
LYMPHOCYTES # BLD AUTO: 1.13 X10(3) UL (ref 1–4)
LYMPHOCYTES NFR BLD AUTO: 13.1 %
MCH RBC QN AUTO: 28.6 PG (ref 26–34)
MCHC RBC AUTO-ENTMCNC: 33.2 G/DL (ref 31–37)
MCV RBC AUTO: 86.2 FL
MONOCYTES # BLD AUTO: 0.63 X10(3) UL (ref 0.1–1)
MONOCYTES NFR BLD AUTO: 7.3 %
NEUTROPHILS # BLD AUTO: 6.59 X10 (3) UL (ref 1.5–7.7)
NEUTROPHILS # BLD AUTO: 6.59 X10(3) UL (ref 1.5–7.7)
NEUTROPHILS NFR BLD AUTO: 76.3 %
OSMOLALITY SERPL CALC.SUM OF ELEC: 295 MOSM/KG (ref 275–295)
PLATELET # BLD AUTO: 342 10(3)UL (ref 150–450)
POTASSIUM SERPL-SCNC: 3.6 MMOL/L (ref 3.5–5.1)
RBC # BLD AUTO: 4.72 X10(6)UL
SODIUM SERPL-SCNC: 141 MMOL/L (ref 136–145)
WBC # BLD AUTO: 8.6 X10(3) UL (ref 4–11)

## 2021-02-23 PROCEDURE — 99217 OBSERVATION CARE DISCHARGE: CPT | Performed by: INTERNAL MEDICINE

## 2021-02-23 PROCEDURE — 99214 OFFICE O/P EST MOD 30 MIN: CPT | Performed by: NURSE PRACTITIONER

## 2021-02-23 RX ORDER — CLINDAMYCIN HYDROCHLORIDE 150 MG/1
450 CAPSULE ORAL 3 TIMES DAILY
Qty: 63 CAPSULE | Refills: 0 | Status: SHIPPED | OUTPATIENT
Start: 2021-02-23 | End: 2021-03-02

## 2021-02-23 NOTE — PLAN OF CARE
Problem: Patient Centered Care  Goal: Patient preferences are identified and integrated in the patient's plan of care  Description: Interventions:  - What would you like us to know as we care for you?  I was just here in the ICU for a CABG on Feb 3rd  - P and behavior  - Position to facilitate oxygenation and minimize respiratory effort  - Oxygen supplementation based on oxygen saturation or ABGs  - Provide Smoking Cessation handout, if applicable  - Encourage broncho-pulmonary hygiene including cough, deep Piyush Shaikh, KISHORE  Outcome: Completed  2/23/2021 1205 by Coty Montemayor RN  Outcome: Adequate for Discharge  2/23/2021 1052 by Coty Montemayor RN  Outcome: Progressing     Patient a/o x4. Room air. IV vanco and lasix given.  Oxygen while walking ass

## 2021-02-23 NOTE — PROGRESS NOTES
[facility]    Progress Note    Arthuro Gallop Patient Status:  Observation    1950 MRN [id number]   Location [facility] 3W/SW Attending Shawn Ni MD   Hosp Day # 0 PCP Moe Mckenzie DO        Subjective: Results:     Lab Results   Component Value Date    WBC 8.6 02/23/2021    HGB 13.5 02/23/2021    HCT 40.7 02/23/2021    .0 02/23/2021    CREATSERUM 1.07 02/23/2021    BUN 21 (H) 02/23/2021     02/23/2021    K 3.6 02/23/2021     02/23/20 hernia. No major discrepancy with preliminary [facility] radiology report.   Dictated by (CST): Jose Orozco MD on 2/22/2021 at 9:59 AM     Finalized by (CST): Jose Orozco MD on 2/22/2021 at 10:08 AM          Ekg 12-lead    Result Date: 2/22/2021  ECG Re

## 2021-02-23 NOTE — PLAN OF CARE
RA. IV Lasix & Vanco. Bed locked and in lowest position at all times. Call light within reach. Will continue to monitor this shift.      Problem: Patient Centered Care  Goal: Patient preferences are identified and integrated in the patient's plan of care  D Therapy support as indicated  - Manage/alleviate anxiety  - Monitor for signs/symptoms of CO2 retention  Outcome: Progressing

## 2021-02-23 NOTE — DISCHARGE SUMMARY
[facility]    Discharge Summary    Faby Laird Patient Status:  Observation    1950 MRN [id number]   Location [facility] 3W/SW Attending Jael Vasquez MD   Hosp Day # 0 PCP Nigel Melgar DO     Date of Admiss °F (36.8 °C) 98 °F (36.7 °C)   TempSrc:   Oral Oral   SpO2:   94% 93%   Weight:  222 lb 11.2 oz (101 kg)       Patient Weight for the past 72 hrs:   Weight   02/21/21 2347 224 lb 8 oz (101.8 kg)   02/22/21 0300 224 lb (101.6 kg)   02/23/21 0425 222 lb 11. 2 Nessa Lin MD on 2/22/2021 at 8:08 AM          Ct Chest Pe Aorta (iv Only) (cpt=71260)    Result Date: 2/22/2021  CONCLUSION:  1. No evidence of pulmonary embolism. Mild enlargement of main pulmonary artery suggests pulmonary hypertension.  2. Moder to Home    Condition at Discharge: Stable     Discharge Medications:      Discharge Medications      START taking these medications      Instructions Prescription details   clindamycin HCl 150 MG Caps  Commonly known as: CLEOCIN      Take 3 capsules (450 m 0     Metoprolol Tartrate 100 MG Tabs  Commonly known as: LOPRESSOR      Take 1 tablet (100 mg total) by mouth 2x Daily(Beta Blocker).  Do not take if BP less than 100, heart rate less than 60   Quantity: 60 tablet  Refills: 0     Omeprazole 40 MG Cpdr As tolerated

## 2021-02-24 ENCOUNTER — TELEPHONE (OUTPATIENT)
Dept: FAMILY MEDICINE CLINIC | Facility: CLINIC | Age: 71
End: 2021-02-24

## 2021-02-24 ENCOUNTER — PATIENT OUTREACH (OUTPATIENT)
Dept: CASE MANAGEMENT | Age: 71
End: 2021-02-24

## 2021-02-24 DIAGNOSIS — Z02.9 ENCOUNTERS FOR UNSPECIFIED ADMINISTRATIVE PURPOSE: ICD-10-CM

## 2021-02-24 NOTE — PROGRESS NOTES
Initial Post Discharge Follow Up   Discharge Date: 2/23/21  Contact Date: 2/24/2021    Consent Verification:  Assessment Completed With: Patient  HIPAA Verified?   Yes    Discharge Dx:     Left lower extremity cellulitis  SOB    General:   • How have you furosemide 20 MG Oral Tab Take 20 mg by mouth 2 (two) times daily. Takes in AM and evening      • Potassium Chloride ER 20 MEQ Oral Tab CR Take 20 mEq by mouth 2 (two) times daily.      • ammonium lactate 12 % External Lotion Apply 1 Application topically a changes noted on AVS?  yes  o If so, were these medication changes discussed with you prior to leaving the hospital? yes  • (NCM) If a new medication was prescribed:    o Was the new medication’s purpose & side effects reviewed?  yes  o Do you have any ques any reason as to why you cannot make your appointments? No     NCM Reviewed upcoming Specialist Appt with patient     Not Applicable    Overall Rating:    How would you rate the care you received while in the hospital?  good     Interventions by NCM: All

## 2021-02-24 NOTE — TELEPHONE ENCOUNTER
Spoke to pt for TCM today. Pt does not have HFU appt scheduled at this time. Attempted to offer an appt however the pt declined stating he will call back at a later time to schedule. TCM/HFU appt recommended by 3/2/21 as pt is a high risk for readmission.

## 2021-02-26 ENCOUNTER — OFFICE VISIT (OUTPATIENT)
Dept: CARDIOLOGY CLINIC | Facility: CLINIC | Age: 71
End: 2021-02-26
Payer: MEDICARE

## 2021-02-26 VITALS
WEIGHT: 228 LBS | HEART RATE: 78 BPM | SYSTOLIC BLOOD PRESSURE: 169 MMHG | HEIGHT: 66 IN | BODY MASS INDEX: 36.64 KG/M2 | DIASTOLIC BLOOD PRESSURE: 95 MMHG

## 2021-02-26 DIAGNOSIS — Z95.1 HX OF CABG: ICD-10-CM

## 2021-02-26 DIAGNOSIS — E78.00 HYPERCHOLESTEROLEMIA: ICD-10-CM

## 2021-02-26 DIAGNOSIS — I25.10 CORONARY ARTERY DISEASE INVOLVING NATIVE CORONARY ARTERY OF NATIVE HEART WITHOUT ANGINA PECTORIS: Primary | ICD-10-CM

## 2021-02-26 DIAGNOSIS — I10 ESSENTIAL HYPERTENSION: ICD-10-CM

## 2021-02-26 PROCEDURE — 99214 OFFICE O/P EST MOD 30 MIN: CPT | Performed by: INTERNAL MEDICINE

## 2021-02-26 NOTE — PATIENT INSTRUCTIONS
Continue current medications. Continue to monitor your blood pressure as discussed. Follow-up with APN in 2 weeks for blood pressure check and with me in 3 months. Start phase 2 cardiac rehab.

## 2021-03-01 ENCOUNTER — PATIENT MESSAGE (OUTPATIENT)
Dept: CARDIOLOGY CLINIC | Facility: CLINIC | Age: 71
End: 2021-03-01

## 2021-03-01 RX ORDER — AMLODIPINE BESYLATE 5 MG/1
5 TABLET ORAL DAILY
Qty: 90 TABLET | Refills: 0 | Status: SHIPPED | OUTPATIENT
Start: 2021-03-01 | End: 2021-05-04

## 2021-03-01 NOTE — TELEPHONE ENCOUNTER
Seems like diet is good and low sodium, continue monitoring, watch for swelling and breathing if wt still up more next week will start lasix

## 2021-03-01 NOTE — TELEPHONE ENCOUNTER
Adriana . Nasra Cruz sent the below message noting some weight gain asking if he should start furosemide. in order to avoid ED visit.   He noted his weights below, please advise:  2/24   221.6     2/25   221.6     2/26   221.6    2/27   223.0   2/28   224.0

## 2021-03-01 NOTE — TELEPHONE ENCOUNTER
Adriana, [address] [address]. Patient states he restarted his Amlodipine. Has follow up with you 3/12. Last office visit with Dr. Loretta Johnson 2/26/21 previously on Amlodipine 5 mg bid, I asked him to clarify dose and frequency.     [facility]  To: MD Jaleel Carr

## 2021-03-01 NOTE — TELEPHONE ENCOUNTER
May try amlodipine 5mg daily and see how BPs respond Stelara Counseling:  I discussed with the patient the risks of ustekinumab including but not limited to immunosuppression, malignancy, posterior leukoencephalopathy syndrome, and serious infections.  The patient understands that monitoring is required including a PPD at baseline and must alert us or the primary physician if symptoms of infection or other concerning signs are noted.

## 2021-03-02 ENCOUNTER — PATIENT MESSAGE (OUTPATIENT)
Dept: CARDIOLOGY CLINIC | Facility: CLINIC | Age: 71
End: 2021-03-02

## 2021-03-02 ENCOUNTER — PATIENT MESSAGE (OUTPATIENT)
Dept: FAMILY MEDICINE CLINIC | Facility: CLINIC | Age: 71
End: 2021-03-02

## 2021-03-02 NOTE — TELEPHONE ENCOUNTER
From: Cata Muñoz  To: Jacinta Cox MD  Sent: 3/2/2021 9:42 AM CST  Subject: Other    Since I take it 3 times a day and should not eat or drink milk products or calcium 2 to 3 hour before or after taking the drug.  I have spaced out the timing to be

## 2021-03-02 NOTE — TELEPHONE ENCOUNTER
From: Mary Boyle  To: Loreen Kayser, MD  Sent: 3/2/2021 9:40 AM CST  Subject: Other    Today is my last day of taking Clindamycin and the pain and swelling has gone down a lot.  However all the pain is not gone and the infected spot is still red yet,

## 2021-03-03 NOTE — TELEPHONE ENCOUNTER
Routed to Dr Dayo Hayes for advise, thanks. See TE 3-2-21.     Future Appointments   Date Time Provider Salvador Melchor   3/8/2021  1:20 PM DO [facility] [place]   3/12/2021  1:30 PM Lavelle Flores, [facility]   5

## 2021-03-03 NOTE — TELEPHONE ENCOUNTER
From: Joey Wiseman  To: Adrian Hwang DO  Sent: 3/2/2021 4:36 PM CST  Subject: Other    Just for Dr Bindu Gibbs I was taking    CLINDAMYCIN 150MN CAPSULES . .. 3 CAPSULS 3 TIMES DAILY FOR 7 DAYS

## 2021-03-04 NOTE — TELEPHONE ENCOUNTER
Patient has sent another [employer] message today 03/04/21 in regards to this inquiry:    Atul Garcia, DO 38 minutes ago (10:09 AM)        What is the status of my inquiry about my Cellulitis Leg Infection?   Two days have passed now

## 2021-03-05 NOTE — TELEPHONE ENCOUNTER
I spoke with the patient and the erythema has resolved and the patient does have an appointment for Monday March 8, 2021. We will reevaluate him at that time.

## 2021-03-05 NOTE — TELEPHONE ENCOUNTER
Spoke with the patient and the erythema is in resolution and we will wait to follow-up with him on Monday, March 8, 2021.

## 2021-03-08 ENCOUNTER — OFFICE VISIT (OUTPATIENT)
Dept: FAMILY MEDICINE CLINIC | Facility: CLINIC | Age: 71
End: 2021-03-08
Payer: MEDICARE

## 2021-03-08 VITALS
HEIGHT: 66 IN | SYSTOLIC BLOOD PRESSURE: 134 MMHG | DIASTOLIC BLOOD PRESSURE: 88 MMHG | BODY MASS INDEX: 35.36 KG/M2 | RESPIRATION RATE: 17 BRPM | HEART RATE: 87 BPM | TEMPERATURE: 98 F | WEIGHT: 220 LBS

## 2021-03-08 DIAGNOSIS — I10 ESSENTIAL HYPERTENSION: ICD-10-CM

## 2021-03-08 DIAGNOSIS — L03.116 LEFT LEG CELLULITIS: ICD-10-CM

## 2021-03-08 DIAGNOSIS — R77.8 ELEVATED TROPONIN: ICD-10-CM

## 2021-03-08 DIAGNOSIS — R06.02 SHORTNESS OF BREATH: ICD-10-CM

## 2021-03-08 DIAGNOSIS — E78.00 HYPERCHOLESTEROLEMIA: ICD-10-CM

## 2021-03-08 DIAGNOSIS — Z95.1 S/P CABG (CORONARY ARTERY BYPASS GRAFT): ICD-10-CM

## 2021-03-08 DIAGNOSIS — I10 ESSENTIAL HYPERTENSION: Primary | ICD-10-CM

## 2021-03-08 PROCEDURE — 99495 TRANSJ CARE MGMT MOD F2F 14D: CPT | Performed by: FAMILY MEDICINE

## 2021-03-08 RX ORDER — CLOPIDOGREL BISULFATE 75 MG/1
75 TABLET ORAL DAILY
Qty: 30 TABLET | Refills: 0 | Status: SHIPPED | OUTPATIENT
Start: 2021-03-08 | End: 2021-05-28

## 2021-03-08 RX ORDER — METOPROLOL TARTRATE 100 MG/1
100 TABLET ORAL
Qty: 60 TABLET | Refills: 0 | Status: SHIPPED | OUTPATIENT
Start: 2021-03-08 | End: 2021-03-09

## 2021-03-08 RX ORDER — B-COMPLEX WITH VITAMIN C
TABLET ORAL
COMMUNITY

## 2021-03-08 RX ORDER — CLOPIDOGREL BISULFATE 75 MG/1
75 TABLET ORAL DAILY
Qty: 90 TABLET | Refills: 3 | Status: SHIPPED | OUTPATIENT
Start: 2021-03-08 | End: 2021-03-08

## 2021-03-08 RX ORDER — METOPROLOL TARTRATE 100 MG/1
100 TABLET ORAL
Qty: 180 TABLET | Refills: 3 | Status: SHIPPED | OUTPATIENT
Start: 2021-03-08 | End: 2021-03-08

## 2021-03-08 NOTE — PATIENT INSTRUCTIONS
Medication reviewed and renewed where needed and appropriate. Comply with medications. Monitor blood pressures and record at home. Limit salt intake. Encouraged physical fitness and daily physical activity daily as tolerated.   Consider oral antihistamin

## 2021-03-08 NOTE — PROGRESS NOTES
HPI:    Cata Muñoz is a 69year old male here today for hospital follow up.    He was discharged from Inpatient hospital, [facility]  to Home   Admission Date: 2/22/21   Discharge Date: 2/23/21  Hospital Discharge Diagnoses (since 2/6/2021) cardiology and CT surgery. At time of interview, patient reported his left lower extremity pain was improved redness and swelling much improved as well. Patient also reported his shortness of breath was resolved.   He was able to ambulate up and down the ER 20 MEQ Oral Tab CR, Take 20 mEq by mouth 2 (two) times daily. (Patient not taking: Reported on 3/8/2021 )  ammonium lactate 12 % External Lotion, Apply 1 Application topically as needed for Dry Skin. Apply to the affected area of skin.   methocarbamol 50 blurred vision or double vision  HEENT: denies nasal congestion, sinus pain or ST  LUNGS: improving shortness of breath with exertion  CARDIOVASCULAR: denies chest pain on exertion or palpitations  GI: denies abdominal pain, denies heartburn, denies diarrh Patient on medication to reduce cholesterol levels. 3. Shortness of breath  Shortness of breath with regards to the hospitalization is improved and the patient is able to ambulate further without significant symptoms.     4. S/P CABG (coronary artery byp

## 2021-03-09 ENCOUNTER — PATIENT MESSAGE (OUTPATIENT)
Dept: FAMILY MEDICINE CLINIC | Facility: CLINIC | Age: 71
End: 2021-03-09

## 2021-03-09 DIAGNOSIS — I10 ESSENTIAL HYPERTENSION: ICD-10-CM

## 2021-03-09 RX ORDER — METOPROLOL TARTRATE 100 MG/1
TABLET ORAL
Qty: 180 TABLET | Refills: 0 | Status: SHIPPED | OUTPATIENT
Start: 2021-03-09 | End: 2021-03-09

## 2021-03-10 RX ORDER — METOPROLOL TARTRATE 100 MG/1
100 TABLET ORAL 2 TIMES DAILY
Qty: 180 TABLET | Refills: 3 | Status: SHIPPED | OUTPATIENT
Start: 2021-03-10 | End: 2021-05-20

## 2021-03-10 RX ORDER — CLOPIDOGREL BISULFATE 75 MG/1
75 TABLET ORAL DAILY
Qty: 90 TABLET | Refills: 3 | Status: SHIPPED | OUTPATIENT
Start: 2021-03-10 | End: 2021-04-11

## 2021-03-10 NOTE — TELEPHONE ENCOUNTER
Routed to Dr Jacquelene Severe for advise, thanks. Pharm updated. Requested Prescriptions     Pending Prescriptions Disp Refills   • Metoprolol Tartrate 100 MG Oral Tab 180 tablet 3     Sig: Take 1 tablet (100 mg total) by mouth 2 (two) times daily.    • Clop

## 2021-03-10 NOTE — TELEPHONE ENCOUNTER
From: Lola Fitzgerald  To: Michelle Pulliam,   Sent: 3/9/2021 4:56 PM CST  Subject: Prescription Question    During my office visit besides having Metoprool Tartrate 100MG refilled at [facility] for current needs next week.   I also requested that [facility]

## 2021-03-12 ENCOUNTER — OFFICE VISIT (OUTPATIENT)
Dept: CARDIOLOGY CLINIC | Facility: CLINIC | Age: 71
End: 2021-03-12
Payer: MEDICARE

## 2021-03-12 VITALS
WEIGHT: 219 LBS | SYSTOLIC BLOOD PRESSURE: 158 MMHG | DIASTOLIC BLOOD PRESSURE: 91 MMHG | BODY MASS INDEX: 35 KG/M2 | HEART RATE: 79 BPM

## 2021-03-12 DIAGNOSIS — I25.119 ATHEROSCLEROSIS OF NATIVE CORONARY ARTERY OF NATIVE HEART WITH ANGINA PECTORIS (HCC): Primary | ICD-10-CM

## 2021-03-12 PROCEDURE — 99214 OFFICE O/P EST MOD 30 MIN: CPT | Performed by: NURSE PRACTITIONER

## 2021-03-12 NOTE — PROGRESS NOTES
Ana Robledo is a 69year old male. Patient presents with: Follow - Up  CAD    HPI:   Patient comes in today for follow-up. He sees Dr. Aaron Gonzalez, he has a history of bypas couple months ago.   He has hypertension start him on amlodipine 5mg his blood pres every day with food     • Clopidogrel Bisulfate (PLAVIX) 75 MG Oral Tab Take 1 tablet (75 mg total) by mouth daily. 90 tablet 3   • furosemide 20 MG Oral Tab Take 20 mg by mouth 2 (two) times daily.  Takes in AM and evening  (Patient not taking: Reported on unusual skin lesions or rashes  RESPIRATORY: denies shortness of breath with exertion  CARDIOVASCULAR: no chest pain  GI: denies abdominal pain and denies heartburn  NEURO: denies headaches    EXAM:   BP (!) 158/91 (BP Location: Right arm, Patient Position

## 2021-03-17 ENCOUNTER — CARDPULM VISIT (OUTPATIENT)
Dept: CARDIAC REHAB | Facility: HOSPITAL | Age: 71
End: 2021-03-17
Attending: INTERNAL MEDICINE
Payer: MEDICARE

## 2021-03-17 DIAGNOSIS — I25.10 CORONARY ARTERY DISEASE INVOLVING NATIVE CORONARY ARTERY OF NATIVE HEART WITHOUT ANGINA PECTORIS: Chronic | ICD-10-CM

## 2021-03-17 DIAGNOSIS — Z95.1 HX OF CABG: ICD-10-CM

## 2021-03-22 ENCOUNTER — CARDPULM VISIT (OUTPATIENT)
Dept: CARDIAC REHAB | Facility: HOSPITAL | Age: 71
End: 2021-03-22
Attending: INTERNAL MEDICINE
Payer: MEDICARE

## 2021-03-22 PROCEDURE — 93798 PHYS/QHP OP CAR RHAB W/ECG: CPT

## 2021-03-25 ENCOUNTER — CARDPULM VISIT (OUTPATIENT)
Dept: CARDIAC REHAB | Facility: HOSPITAL | Age: 71
End: 2021-03-25
Attending: INTERNAL MEDICINE
Payer: MEDICARE

## 2021-03-25 PROCEDURE — 93798 PHYS/QHP OP CAR RHAB W/ECG: CPT

## 2021-03-29 ENCOUNTER — CARDPULM VISIT (OUTPATIENT)
Dept: CARDIAC REHAB | Facility: HOSPITAL | Age: 71
End: 2021-03-29
Attending: INTERNAL MEDICINE
Payer: MEDICARE

## 2021-03-29 PROCEDURE — 93798 PHYS/QHP OP CAR RHAB W/ECG: CPT

## 2021-03-31 ENCOUNTER — CARDPULM VISIT (OUTPATIENT)
Dept: CARDIAC REHAB | Facility: HOSPITAL | Age: 71
End: 2021-03-31
Attending: INTERNAL MEDICINE
Payer: MEDICARE

## 2021-03-31 PROCEDURE — 93798 PHYS/QHP OP CAR RHAB W/ECG: CPT

## 2021-04-01 ENCOUNTER — CARDPULM VISIT (OUTPATIENT)
Dept: CARDIAC REHAB | Facility: HOSPITAL | Age: 71
End: 2021-04-01
Attending: INTERNAL MEDICINE
Payer: MEDICARE

## 2021-04-01 PROCEDURE — 93798 PHYS/QHP OP CAR RHAB W/ECG: CPT

## 2021-04-05 ENCOUNTER — CARDPULM VISIT (OUTPATIENT)
Dept: CARDIAC REHAB | Facility: HOSPITAL | Age: 71
End: 2021-04-05
Attending: INTERNAL MEDICINE
Payer: MEDICARE

## 2021-04-05 PROCEDURE — 93798 PHYS/QHP OP CAR RHAB W/ECG: CPT

## 2021-04-07 ENCOUNTER — CARDPULM VISIT (OUTPATIENT)
Dept: CARDIAC REHAB | Facility: HOSPITAL | Age: 71
End: 2021-04-07
Attending: INTERNAL MEDICINE
Payer: MEDICARE

## 2021-04-07 PROCEDURE — 93798 PHYS/QHP OP CAR RHAB W/ECG: CPT

## 2021-04-08 ENCOUNTER — CARDPULM VISIT (OUTPATIENT)
Dept: CARDIAC REHAB | Facility: HOSPITAL | Age: 71
End: 2021-04-08
Attending: INTERNAL MEDICINE
Payer: MEDICARE

## 2021-04-08 PROCEDURE — 93798 PHYS/QHP OP CAR RHAB W/ECG: CPT

## 2021-04-11 RX ORDER — CLOPIDOGREL BISULFATE 75 MG/1
75 TABLET ORAL DAILY
Qty: 90 TABLET | Refills: 3 | Status: SHIPPED | OUTPATIENT
Start: 2021-04-11 | End: 2021-10-29

## 2021-04-12 ENCOUNTER — CARDPULM VISIT (OUTPATIENT)
Dept: CARDIAC REHAB | Facility: HOSPITAL | Age: 71
End: 2021-04-12
Attending: INTERNAL MEDICINE
Payer: MEDICARE

## 2021-04-12 PROCEDURE — 93798 PHYS/QHP OP CAR RHAB W/ECG: CPT

## 2021-04-14 ENCOUNTER — CARDPULM VISIT (OUTPATIENT)
Dept: CARDIAC REHAB | Facility: HOSPITAL | Age: 71
End: 2021-04-14
Attending: INTERNAL MEDICINE
Payer: MEDICARE

## 2021-04-14 PROCEDURE — 93798 PHYS/QHP OP CAR RHAB W/ECG: CPT

## 2021-04-15 ENCOUNTER — CARDPULM VISIT (OUTPATIENT)
Dept: CARDIAC REHAB | Facility: HOSPITAL | Age: 71
End: 2021-04-15
Attending: INTERNAL MEDICINE
Payer: MEDICARE

## 2021-04-15 PROCEDURE — 93798 PHYS/QHP OP CAR RHAB W/ECG: CPT

## 2021-04-19 ENCOUNTER — CARDPULM VISIT (OUTPATIENT)
Dept: CARDIAC REHAB | Facility: HOSPITAL | Age: 71
End: 2021-04-19
Attending: INTERNAL MEDICINE
Payer: MEDICARE

## 2021-04-19 PROCEDURE — 93798 PHYS/QHP OP CAR RHAB W/ECG: CPT

## 2021-04-21 ENCOUNTER — CARDPULM VISIT (OUTPATIENT)
Dept: CARDIAC REHAB | Facility: HOSPITAL | Age: 71
End: 2021-04-21
Attending: INTERNAL MEDICINE
Payer: MEDICARE

## 2021-04-21 PROCEDURE — 93798 PHYS/QHP OP CAR RHAB W/ECG: CPT

## 2021-04-22 ENCOUNTER — CARDPULM VISIT (OUTPATIENT)
Dept: CARDIAC REHAB | Facility: HOSPITAL | Age: 71
End: 2021-04-22
Attending: INTERNAL MEDICINE
Payer: MEDICARE

## 2021-04-22 PROCEDURE — 93798 PHYS/QHP OP CAR RHAB W/ECG: CPT

## 2021-04-26 ENCOUNTER — CARDPULM VISIT (OUTPATIENT)
Dept: CARDIAC REHAB | Facility: HOSPITAL | Age: 71
End: 2021-04-26
Attending: INTERNAL MEDICINE
Payer: MEDICARE

## 2021-04-26 PROCEDURE — 93798 PHYS/QHP OP CAR RHAB W/ECG: CPT

## 2021-04-29 ENCOUNTER — CARDPULM VISIT (OUTPATIENT)
Dept: CARDIAC REHAB | Facility: HOSPITAL | Age: 71
End: 2021-04-29
Attending: INTERNAL MEDICINE
Payer: MEDICARE

## 2021-04-29 PROCEDURE — 93798 PHYS/QHP OP CAR RHAB W/ECG: CPT

## 2021-05-03 ENCOUNTER — CARDPULM VISIT (OUTPATIENT)
Dept: CARDIAC REHAB | Facility: HOSPITAL | Age: 71
End: 2021-05-03
Attending: INTERNAL MEDICINE
Payer: MEDICARE

## 2021-05-03 PROCEDURE — 93798 PHYS/QHP OP CAR RHAB W/ECG: CPT

## 2021-05-04 RX ORDER — AMLODIPINE BESYLATE 5 MG/1
TABLET ORAL
Qty: 90 TABLET | Refills: 1 | Status: SHIPPED | OUTPATIENT
Start: 2021-05-04

## 2021-05-04 NOTE — TELEPHONE ENCOUNTER
Amlodipine 5 mg daily. Dose verified, has follow up scheduled. Creatinine stable  Meets refill protocol criteria. Refill sent.

## 2021-05-05 ENCOUNTER — CARDPULM VISIT (OUTPATIENT)
Dept: CARDIAC REHAB | Facility: HOSPITAL | Age: 71
End: 2021-05-05
Attending: INTERNAL MEDICINE
Payer: MEDICARE

## 2021-05-05 PROCEDURE — 93798 PHYS/QHP OP CAR RHAB W/ECG: CPT

## 2021-05-06 ENCOUNTER — CARDPULM VISIT (OUTPATIENT)
Dept: CARDIAC REHAB | Facility: HOSPITAL | Age: 71
End: 2021-05-06
Attending: INTERNAL MEDICINE
Payer: MEDICARE

## 2021-05-06 PROCEDURE — 93798 PHYS/QHP OP CAR RHAB W/ECG: CPT

## 2021-05-10 ENCOUNTER — CARDPULM VISIT (OUTPATIENT)
Dept: CARDIAC REHAB | Facility: HOSPITAL | Age: 71
End: 2021-05-10
Attending: INTERNAL MEDICINE
Payer: MEDICARE

## 2021-05-10 PROCEDURE — 93798 PHYS/QHP OP CAR RHAB W/ECG: CPT

## 2021-05-12 ENCOUNTER — CARDPULM VISIT (OUTPATIENT)
Dept: CARDIAC REHAB | Facility: HOSPITAL | Age: 71
End: 2021-05-12
Attending: INTERNAL MEDICINE
Payer: MEDICARE

## 2021-05-12 PROCEDURE — 93798 PHYS/QHP OP CAR RHAB W/ECG: CPT

## 2021-05-13 ENCOUNTER — PATIENT MESSAGE (OUTPATIENT)
Dept: FAMILY MEDICINE CLINIC | Facility: CLINIC | Age: 71
End: 2021-05-13

## 2021-05-13 ENCOUNTER — CARDPULM VISIT (OUTPATIENT)
Dept: CARDIAC REHAB | Facility: HOSPITAL | Age: 71
End: 2021-05-13
Attending: INTERNAL MEDICINE
Payer: MEDICARE

## 2021-05-13 DIAGNOSIS — I10 ESSENTIAL HYPERTENSION: ICD-10-CM

## 2021-05-13 PROCEDURE — 93798 PHYS/QHP OP CAR RHAB W/ECG: CPT

## 2021-05-13 RX ORDER — ATORVASTATIN CALCIUM 40 MG/1
40 TABLET, FILM COATED ORAL NIGHTLY
Qty: 90 TABLET | Refills: 1 | Status: SHIPPED | OUTPATIENT
Start: 2021-05-13 | End: 2021-10-05

## 2021-05-13 NOTE — TELEPHONE ENCOUNTER
From: Ca Smith  To: Yung Morales DO  Sent: 5/13/2021 9:47 AM CDT  Subject: Prescription Question    Since my bypass surgery the prescriptions for the new drugs were ordered from [employer].  That is OK for immediate drug needs but now I want

## 2021-05-13 NOTE — TELEPHONE ENCOUNTER
Routed to Dr Gabbi Beltran for advise, thanks. Also relocality message sent to pt. Requested Prescriptions     Pending Prescriptions Disp Refills   • atorvastatin 40 MG Oral Tab 90 tablet 1     Sig: Take 1 tablet (40 mg total) by mouth nightly.        Last

## 2021-05-17 ENCOUNTER — CARDPULM VISIT (OUTPATIENT)
Dept: CARDIAC REHAB | Facility: HOSPITAL | Age: 71
End: 2021-05-17
Attending: INTERNAL MEDICINE
Payer: MEDICARE

## 2021-05-17 PROCEDURE — 93798 PHYS/QHP OP CAR RHAB W/ECG: CPT

## 2021-05-19 ENCOUNTER — CARDPULM VISIT (OUTPATIENT)
Dept: CARDIAC REHAB | Facility: HOSPITAL | Age: 71
End: 2021-05-19
Attending: INTERNAL MEDICINE
Payer: MEDICARE

## 2021-05-19 PROCEDURE — 93798 PHYS/QHP OP CAR RHAB W/ECG: CPT

## 2021-05-20 ENCOUNTER — CARDPULM VISIT (OUTPATIENT)
Dept: CARDIAC REHAB | Facility: HOSPITAL | Age: 71
End: 2021-05-20
Attending: INTERNAL MEDICINE
Payer: MEDICARE

## 2021-05-20 DIAGNOSIS — I10 ESSENTIAL HYPERTENSION: ICD-10-CM

## 2021-05-20 PROCEDURE — 93798 PHYS/QHP OP CAR RHAB W/ECG: CPT

## 2021-05-20 RX ORDER — METOPROLOL TARTRATE 100 MG/1
100 TABLET ORAL 2 TIMES DAILY
Qty: 180 TABLET | Refills: 3 | Status: SHIPPED | OUTPATIENT
Start: 2021-05-20 | End: 2021-06-11

## 2021-05-24 ENCOUNTER — CARDPULM VISIT (OUTPATIENT)
Dept: CARDIAC REHAB | Facility: HOSPITAL | Age: 71
End: 2021-05-24
Attending: INTERNAL MEDICINE
Payer: MEDICARE

## 2021-05-24 PROCEDURE — 93798 PHYS/QHP OP CAR RHAB W/ECG: CPT

## 2021-05-26 ENCOUNTER — CARDPULM VISIT (OUTPATIENT)
Dept: CARDIAC REHAB | Facility: HOSPITAL | Age: 71
End: 2021-05-26
Attending: INTERNAL MEDICINE
Payer: MEDICARE

## 2021-05-26 PROCEDURE — 93798 PHYS/QHP OP CAR RHAB W/ECG: CPT

## 2021-05-27 ENCOUNTER — CARDPULM VISIT (OUTPATIENT)
Dept: CARDIAC REHAB | Facility: HOSPITAL | Age: 71
End: 2021-05-27
Attending: INTERNAL MEDICINE
Payer: MEDICARE

## 2021-05-27 PROCEDURE — 93798 PHYS/QHP OP CAR RHAB W/ECG: CPT

## 2021-05-28 ENCOUNTER — OFFICE VISIT (OUTPATIENT)
Dept: CARDIOLOGY CLINIC | Facility: CLINIC | Age: 71
End: 2021-05-28
Payer: MEDICARE

## 2021-05-28 VITALS
WEIGHT: 219 LBS | BODY MASS INDEX: 35.2 KG/M2 | DIASTOLIC BLOOD PRESSURE: 87 MMHG | SYSTOLIC BLOOD PRESSURE: 166 MMHG | HEIGHT: 66 IN | HEART RATE: 70 BPM

## 2021-05-28 DIAGNOSIS — Z95.1 HX OF CABG: ICD-10-CM

## 2021-05-28 DIAGNOSIS — E78.00 HYPERCHOLESTEROLEMIA: ICD-10-CM

## 2021-05-28 DIAGNOSIS — I10 ESSENTIAL HYPERTENSION: ICD-10-CM

## 2021-05-28 DIAGNOSIS — I25.10 CORONARY ARTERY DISEASE INVOLVING NATIVE CORONARY ARTERY OF NATIVE HEART WITHOUT ANGINA PECTORIS: Primary | Chronic | ICD-10-CM

## 2021-05-28 PROBLEM — R79.89 ELEVATED TROPONIN: Status: RESOLVED | Noted: 2021-02-22 | Resolved: 2021-05-28

## 2021-05-28 PROBLEM — R94.31 ABNORMAL ECG: Status: RESOLVED | Noted: 2021-01-28 | Resolved: 2021-05-28

## 2021-05-28 PROBLEM — I25.119 ATHEROSCLEROSIS OF NATIVE CORONARY ARTERY OF NATIVE HEART WITH ANGINA PECTORIS (HCC): Status: RESOLVED | Noted: 2021-02-04 | Resolved: 2021-05-28

## 2021-05-28 PROBLEM — R77.8 ELEVATED TROPONIN: Status: RESOLVED | Noted: 2021-02-22 | Resolved: 2021-05-28

## 2021-05-28 PROBLEM — I25.119 ATHEROSCLEROSIS OF NATIVE CORONARY ARTERY OF NATIVE HEART WITH ANGINA PECTORIS: Status: RESOLVED | Noted: 2021-02-04 | Resolved: 2021-05-28

## 2021-05-28 PROCEDURE — 99214 OFFICE O/P EST MOD 30 MIN: CPT | Performed by: INTERNAL MEDICINE

## 2021-05-28 NOTE — PROGRESS NOTES
Isidro Mondragon is a 66year old male. Patient presents with: Follow - Up: 3 mo  CAD    HPI:   Patient is here for follow-up appointment. He is 3 months post CABG.   He denies any chest pain shortness of breath dizziness lightheadedness syncope or near sy Unspecified essential hypertension    • Visual impairment     glasses      Social History:  Social History    Tobacco Use      Smoking status: Never Smoker      Smokeless tobacco: Never Used    Vaping Use      Vaping Use: Never used    Alcohol use:  Yes

## 2021-05-28 NOTE — PATIENT INSTRUCTIONS
Continue current medications. Continue to watch her blood pressure at home. Bring your readings from cardiac rehab next time you visit me. Blood test within the next few months. Follow-up with me in 3 months or sooner if needed.

## 2021-06-03 ENCOUNTER — CARDPULM VISIT (OUTPATIENT)
Dept: CARDIAC REHAB | Facility: HOSPITAL | Age: 71
End: 2021-06-03
Attending: INTERNAL MEDICINE
Payer: MEDICARE

## 2021-06-03 PROCEDURE — 93798 PHYS/QHP OP CAR RHAB W/ECG: CPT

## 2021-06-07 ENCOUNTER — CARDPULM VISIT (OUTPATIENT)
Dept: CARDIAC REHAB | Facility: HOSPITAL | Age: 71
End: 2021-06-07
Attending: INTERNAL MEDICINE
Payer: MEDICARE

## 2021-06-07 PROCEDURE — 93798 PHYS/QHP OP CAR RHAB W/ECG: CPT

## 2021-06-09 ENCOUNTER — CARDPULM VISIT (OUTPATIENT)
Dept: CARDIAC REHAB | Facility: HOSPITAL | Age: 71
End: 2021-06-09
Attending: INTERNAL MEDICINE
Payer: MEDICARE

## 2021-06-09 PROCEDURE — 93798 PHYS/QHP OP CAR RHAB W/ECG: CPT

## 2021-06-10 ENCOUNTER — CARDPULM VISIT (OUTPATIENT)
Dept: CARDIAC REHAB | Facility: HOSPITAL | Age: 71
End: 2021-06-10
Attending: INTERNAL MEDICINE
Payer: MEDICARE

## 2021-06-10 PROCEDURE — 93798 PHYS/QHP OP CAR RHAB W/ECG: CPT

## 2021-06-11 DIAGNOSIS — I10 ESSENTIAL HYPERTENSION: ICD-10-CM

## 2021-06-11 RX ORDER — METOPROLOL TARTRATE 100 MG/1
TABLET ORAL
Qty: 180 TABLET | Refills: 3 | Status: SHIPPED | OUTPATIENT
Start: 2021-06-11

## 2021-06-14 ENCOUNTER — CARDPULM VISIT (OUTPATIENT)
Dept: CARDIAC REHAB | Facility: HOSPITAL | Age: 71
End: 2021-06-14
Attending: INTERNAL MEDICINE
Payer: MEDICARE

## 2021-06-14 PROCEDURE — 93798 PHYS/QHP OP CAR RHAB W/ECG: CPT

## 2021-06-16 ENCOUNTER — CARDPULM VISIT (OUTPATIENT)
Dept: CARDIAC REHAB | Facility: HOSPITAL | Age: 71
End: 2021-06-16
Attending: INTERNAL MEDICINE
Payer: MEDICARE

## 2021-06-16 PROCEDURE — 93798 PHYS/QHP OP CAR RHAB W/ECG: CPT

## 2021-06-17 ENCOUNTER — CARDPULM VISIT (OUTPATIENT)
Dept: CARDIAC REHAB | Facility: HOSPITAL | Age: 71
End: 2021-06-17
Attending: INTERNAL MEDICINE
Payer: MEDICARE

## 2021-06-17 PROCEDURE — 93798 PHYS/QHP OP CAR RHAB W/ECG: CPT

## 2021-06-21 ENCOUNTER — CARDPULM VISIT (OUTPATIENT)
Dept: CARDIAC REHAB | Facility: HOSPITAL | Age: 71
End: 2021-06-21
Attending: INTERNAL MEDICINE
Payer: MEDICARE

## 2021-06-21 PROCEDURE — 93798 PHYS/QHP OP CAR RHAB W/ECG: CPT

## 2021-07-11 RX ORDER — TESTOSTERONE CYPIONATE 200 MG/ML
INJECTION INTRAMUSCULAR
Qty: 6 ML | Refills: 0 | Status: SHIPPED | OUTPATIENT
Start: 2021-07-11

## 2021-07-11 RX ORDER — AMMONIUM LACTATE 12 G/100G
LOTION TOPICAL
Qty: 225 G | Refills: 0 | Status: SHIPPED | OUTPATIENT
Start: 2021-07-11 | End: 2021-10-29

## 2021-08-07 ENCOUNTER — PATIENT MESSAGE (OUTPATIENT)
Dept: FAMILY MEDICINE CLINIC | Facility: CLINIC | Age: 71
End: 2021-08-07

## 2021-08-07 NOTE — TELEPHONE ENCOUNTER
From: Cata Muñoz  To: Justin Medina DO  Sent: 8/7/2021 11:30 AM CDT  Subject: Visit Follow-up Question    On April 9th 2021 I received the [address] Covid Vaccine shot.  I keep reading online about pfizer and Moderna vaccines need a 3rd

## 2021-08-07 NOTE — TELEPHONE ENCOUNTER
[employer] sent advising pt that we do not have updated info available on COVID boosters and to see CDC for further info.

## 2021-09-10 ENCOUNTER — LAB ENCOUNTER (OUTPATIENT)
Dept: LAB | Age: 71
End: 2021-09-10
Attending: INTERNAL MEDICINE
Payer: MEDICARE

## 2021-09-10 DIAGNOSIS — I25.10 CORONARY ARTERY DISEASE INVOLVING NATIVE CORONARY ARTERY OF NATIVE HEART WITHOUT ANGINA PECTORIS: Chronic | ICD-10-CM

## 2021-09-10 DIAGNOSIS — Z00.00 ROUTINE PHYSICAL EXAMINATION: ICD-10-CM

## 2021-09-10 DIAGNOSIS — Z12.5 PROSTATE CANCER SCREENING: ICD-10-CM

## 2021-09-10 DIAGNOSIS — Z95.1 HX OF CABG: ICD-10-CM

## 2021-09-10 LAB
ALBUMIN SERPL-MCNC: 3.8 G/DL (ref 3.4–5)
ALBUMIN/GLOB SERPL: 1 {RATIO} (ref 1–2)
ALP LIVER SERPL-CCNC: 82 U/L
ALT SERPL-CCNC: 17 U/L
ANION GAP SERPL CALC-SCNC: 7 MMOL/L (ref 0–18)
AST SERPL-CCNC: 14 U/L (ref 15–37)
BASOPHILS # BLD AUTO: 0.07 X10(3) UL (ref 0–0.2)
BASOPHILS NFR BLD AUTO: 0.7 %
BILIRUB SERPL-MCNC: 1.3 MG/DL (ref 0.1–2)
BUN BLD-MCNC: 14 MG/DL (ref 7–18)
BUN/CREAT SERPL: 13.5 (ref 10–20)
CALCIUM BLD-MCNC: 9.2 MG/DL (ref 8.5–10.1)
CHLORIDE SERPL-SCNC: 103 MMOL/L (ref 98–112)
CHOLEST SMN-MCNC: 134 MG/DL (ref ?–200)
CO2 SERPL-SCNC: 30 MMOL/L (ref 21–32)
COMPLEXED PSA SERPL-MCNC: 2.47 NG/ML (ref ?–4)
CREAT BLD-MCNC: 1.04 MG/DL
DEPRECATED RDW RBC AUTO: 41.5 FL (ref 35.1–46.3)
EOSINOPHIL # BLD AUTO: 0.3 X10(3) UL (ref 0–0.7)
EOSINOPHIL NFR BLD AUTO: 2.9 %
ERYTHROCYTE [DISTWIDTH] IN BLOOD BY AUTOMATED COUNT: 13.3 % (ref 11–15)
GLOBULIN PLAS-MCNC: 3.7 G/DL (ref 2.8–4.4)
GLUCOSE BLD-MCNC: 100 MG/DL (ref 70–99)
HCT VFR BLD AUTO: 50.9 %
HDLC SERPL-MCNC: 43 MG/DL (ref 40–59)
HGB BLD-MCNC: 16.3 G/DL
IMM GRANULOCYTES # BLD AUTO: 0.04 X10(3) UL (ref 0–1)
IMM GRANULOCYTES NFR BLD: 0.4 %
LDLC SERPL CALC-MCNC: 70 MG/DL (ref ?–100)
LYMPHOCYTES # BLD AUTO: 2.54 X10(3) UL (ref 1–4)
LYMPHOCYTES NFR BLD AUTO: 25 %
M PROTEIN MFR SERPL ELPH: 7.5 G/DL (ref 6.4–8.2)
MCH RBC QN AUTO: 27.1 PG (ref 26–34)
MCHC RBC AUTO-ENTMCNC: 32 G/DL (ref 31–37)
MCV RBC AUTO: 84.7 FL
MONOCYTES # BLD AUTO: 0.64 X10(3) UL (ref 0.1–1)
MONOCYTES NFR BLD AUTO: 6.3 %
NEUTROPHILS # BLD AUTO: 6.58 X10 (3) UL (ref 1.5–7.7)
NEUTROPHILS # BLD AUTO: 6.58 X10(3) UL (ref 1.5–7.7)
NEUTROPHILS NFR BLD AUTO: 64.7 %
NONHDLC SERPL-MCNC: 91 MG/DL (ref ?–130)
OSMOLALITY SERPL CALC.SUM OF ELEC: 291 MOSM/KG (ref 275–295)
PATIENT FASTING Y/N/NP: YES
PATIENT FASTING Y/N/NP: YES
PLATELET # BLD AUTO: 216 10(3)UL (ref 150–450)
POTASSIUM SERPL-SCNC: 3.4 MMOL/L (ref 3.5–5.1)
RBC # BLD AUTO: 6.01 X10(6)UL
SODIUM SERPL-SCNC: 140 MMOL/L (ref 136–145)
TRIGL SERPL-MCNC: 115 MG/DL (ref 30–149)
TSI SER-ACNC: 1.2 MIU/ML (ref 0.36–3.74)
VLDLC SERPL CALC-MCNC: 18 MG/DL (ref 0–30)
WBC # BLD AUTO: 10.2 X10(3) UL (ref 4–11)

## 2021-09-10 PROCEDURE — 80061 LIPID PANEL: CPT

## 2021-09-10 PROCEDURE — 80053 COMPREHEN METABOLIC PANEL: CPT

## 2021-09-10 PROCEDURE — 85025 COMPLETE CBC W/AUTO DIFF WBC: CPT

## 2021-09-10 PROCEDURE — 84443 ASSAY THYROID STIM HORMONE: CPT

## 2021-09-10 PROCEDURE — 36415 COLL VENOUS BLD VENIPUNCTURE: CPT

## 2021-10-05 RX ORDER — ATORVASTATIN CALCIUM 40 MG/1
40 TABLET, FILM COATED ORAL NIGHTLY
Qty: 90 TABLET | Refills: 1 | Status: SHIPPED | OUTPATIENT
Start: 2021-10-05 | End: 2022-03-22

## 2021-10-05 NOTE — TELEPHONE ENCOUNTER
Refill passed per [address] [facility] protocol. Requested Prescriptions   Pending Prescriptions Disp Refills    ATORVASTATIN 40 MG Oral Tab [Pharmacy Med Name: Atorvastatin Calcium 40 MG Oral Tablet] 90 tablet 3     Sig: TAKE 1 TABLET BY MOUTH AT  NIGHT        Cholesterol Medication Protocol Passed - 10/5/2021  5:03 AM        Passed - ALT in past 12 months        Passed - LDL in past 12 months        Passed - Last ALT < 80       Lab Results   Component Value Date    ALT 17 09/10/2021             Passed - Last LDL < 130     Lab Results   Component Value Date    LDL 70 09/10/2021               Passed - Appointment in past 12 or next 3 months              Recent Outpatient Visits              3 months ago     [facility] Cardiopulmonary Rehab    Nurse Only    3 months ago     [facility]    Nurse Only    3 months ago     [facility]    Nurse Only    3 months ago     [facility]    Nurse Only    3 months ago     [facility]    Nurse Only            Future Appointments         Provider Department Appt Notes    In 3 weeks Stoney Torres MD [facility] Follow up, policy informed     In 1 month Gonzalez [address] [place] [address] checkup and cannabis use for sleep aid.

## 2021-10-09 DIAGNOSIS — I10 ESSENTIAL HYPERTENSION: ICD-10-CM

## 2021-10-09 DIAGNOSIS — K21.9 GASTROESOPHAGEAL REFLUX DISEASE: ICD-10-CM

## 2021-10-10 RX ORDER — VALSARTAN AND HYDROCHLOROTHIAZIDE 320; 25 MG/1; MG/1
1 TABLET, FILM COATED ORAL DAILY
Qty: 90 TABLET | Refills: 3 | Status: SHIPPED | OUTPATIENT
Start: 2021-10-10

## 2021-10-10 RX ORDER — OMEPRAZOLE 40 MG/1
CAPSULE, DELAYED RELEASE ORAL
Qty: 90 CAPSULE | Refills: 3 | Status: SHIPPED | OUTPATIENT
Start: 2021-10-10

## 2021-10-27 ENCOUNTER — LAB ENCOUNTER (OUTPATIENT)
Dept: LAB | Age: 71
End: 2021-10-27
Attending: FAMILY MEDICINE
Payer: MEDICARE

## 2021-10-27 DIAGNOSIS — Z00.00 ROUTINE PHYSICAL EXAMINATION: ICD-10-CM

## 2021-10-27 PROCEDURE — 81001 URINALYSIS AUTO W/SCOPE: CPT

## 2021-10-29 ENCOUNTER — OFFICE VISIT (OUTPATIENT)
Dept: CARDIOLOGY CLINIC | Facility: CLINIC | Age: 71
End: 2021-10-29
Payer: MEDICARE

## 2021-10-29 VITALS
HEART RATE: 93 BPM | HEIGHT: 66 IN | DIASTOLIC BLOOD PRESSURE: 84 MMHG | SYSTOLIC BLOOD PRESSURE: 159 MMHG | BODY MASS INDEX: 33.43 KG/M2 | WEIGHT: 208 LBS

## 2021-10-29 DIAGNOSIS — E78.00 HYPERCHOLESTEROLEMIA: ICD-10-CM

## 2021-10-29 DIAGNOSIS — Z95.1 HX OF CABG: ICD-10-CM

## 2021-10-29 DIAGNOSIS — I25.10 CORONARY ARTERY DISEASE INVOLVING NATIVE CORONARY ARTERY OF NATIVE HEART WITHOUT ANGINA PECTORIS: Primary | ICD-10-CM

## 2021-10-29 DIAGNOSIS — I10 ESSENTIAL HYPERTENSION: ICD-10-CM

## 2021-10-29 PROCEDURE — 99214 OFFICE O/P EST MOD 30 MIN: CPT | Performed by: INTERNAL MEDICINE

## 2021-10-29 NOTE — PATIENT INSTRUCTIONS
Discontinue Plavix. Continue rest of the medications. Follow-up with me in 6 months or sooner if needed.

## 2021-10-29 NOTE — PROGRESS NOTES
Isidro Mondragon is a 66year old male. Patient presents with: Follow - Up  CAD    HPI:   Patient is here for follow-up appointment. He is status post CABG greater than 6 months ago for severe coronary artery disease.   He continues to do well without any High cholesterol    • Male hypogonadism    • Obesity, unspecified    • Other and unspecified hyperlipidemia    • Pneumonia due to organism     15-20 yrs ago   • Sleep apnea    • Unspecified essential hypertension    • Visual impairment     glasses      Soc

## 2021-12-02 ENCOUNTER — OFFICE VISIT (OUTPATIENT)
Dept: FAMILY MEDICINE CLINIC | Facility: CLINIC | Age: 71
End: 2021-12-02
Payer: MEDICARE

## 2021-12-02 VITALS
DIASTOLIC BLOOD PRESSURE: 81 MMHG | BODY MASS INDEX: 33.95 KG/M2 | WEIGHT: 211.25 LBS | HEIGHT: 66 IN | TEMPERATURE: 98 F | HEART RATE: 94 BPM | SYSTOLIC BLOOD PRESSURE: 139 MMHG

## 2021-12-02 DIAGNOSIS — I10 ESSENTIAL HYPERTENSION: ICD-10-CM

## 2021-12-02 DIAGNOSIS — J30.9 ALLERGIC RHINITIS, UNSPECIFIED SEASONALITY, UNSPECIFIED TRIGGER: ICD-10-CM

## 2021-12-02 DIAGNOSIS — Z00.00 MEDICARE ANNUAL WELLNESS VISIT, INITIAL: Primary | ICD-10-CM

## 2021-12-02 PROCEDURE — G0439 PPPS, SUBSEQ VISIT: HCPCS | Performed by: FAMILY MEDICINE

## 2021-12-02 NOTE — PROGRESS NOTES
Subjective:   Patient ID: Berta Soliman is a 66year old male.     66year old  male here for medicare annual visit and for status update on any confirmed chronic medical illnesses and follow up on any previous labs or procedures that were sugge SERVICES  INDICATIONS AND SCHEDULE Internal Lab or Procedure External Lab or Procedure   Diabetes Screening      HbgA1C   Annually HgbA1C (%)   Date Value   02/03/2021 5.6    No flowsheet data found.     Fasting Blood Sugar (FSB) Annually Glucose (mg/dL) Value   09/10/2021 3.4 (L)     POTASSIUM (P) (mmol/L)   Date Value   08/09/2016 3.2 (L)    No flowsheet data found. Creatinine  Annually Creatinine (mg/dL)   Date Value   09/10/2021 1.04    No flowsheet data found.     Digoxin Serum Conc  Annually No res Depression Screening (PHQ-2/PHQ-9): Over the LAST 2 WEEKS                      Advance Directives                Hearing Assessment (Required for AWV/SWV)      Hearing Screening    Time taken: 12/2/2021  1:54 PM  Entry U Physical Exam  HENT:      Head: Normocephalic and atraumatic.       Right Ear: Tympanic membrane normal.      Left Ear: Tympanic membrane normal.      Nose: Nose normal.      Mouth/Throat:      Mouth: Mucous membranes are moist.   Neck:      Thyroid:

## 2021-12-30 ENCOUNTER — IMMUNIZATION (OUTPATIENT)
Dept: FAMILY MEDICINE CLINIC | Facility: CLINIC | Age: 71
End: 2021-12-30
Payer: MEDICARE

## 2021-12-30 DIAGNOSIS — Z23 NEED FOR VACCINATION: Primary | ICD-10-CM

## 2021-12-30 PROCEDURE — 90662 IIV NO PRSV INCREASED AG IM: CPT | Performed by: FAMILY MEDICINE

## 2021-12-30 PROCEDURE — G0008 ADMIN INFLUENZA VIRUS VAC: HCPCS | Performed by: FAMILY MEDICINE

## 2022-01-08 NOTE — TELEPHONE ENCOUNTER
Refill passed per [facility], [facility] protocol.   Hypertensive Medications  Protocol Criteria:  · Appointment scheduled in the past 6 months or in the next 3 months  · BMP or CMP in the past 12 months  · Creatinine result < 2  Recent Outpatient Visits
denies pain/discomfort

## 2022-03-21 RX ORDER — AMLODIPINE BESYLATE 5 MG/1
TABLET ORAL
Qty: 90 TABLET | Refills: 3 | OUTPATIENT
Start: 2022-03-21

## 2022-03-22 RX ORDER — ATORVASTATIN CALCIUM 40 MG/1
40 TABLET, FILM COATED ORAL NIGHTLY
Qty: 90 TABLET | Refills: 1 | Status: SHIPPED | OUTPATIENT
Start: 2022-03-22

## 2022-03-22 NOTE — TELEPHONE ENCOUNTER
Refill passed per [facility] protocol   Requested Prescriptions   Pending Prescriptions Disp Refills    ATORVASTATIN 40 MG Oral Tab [Pharmacy Med Name: Atorvastatin Calcium 40 MG Oral Tablet] 90 tablet 3     Sig: TAKE 1 TABLET BY MOUTH AT  NIGHT        Cholesterol Medication Protocol Passed - 3/21/2022  8:42 PM        Passed - ALT in past 12 months        Passed - LDL in past 12 months        Passed - Last ALT < 80       Lab Results   Component Value Date    ALT 17 09/10/2021             Passed - Last LDL < 130     Lab Results   Component Value Date    LDL 70 09/10/2021               Passed - Appointment in past 12 or next 3 months

## 2022-04-16 ENCOUNTER — PATIENT MESSAGE (OUTPATIENT)
Dept: CASE MANAGEMENT | Age: 72
End: 2022-04-16

## 2022-04-18 NOTE — TELEPHONE ENCOUNTER
Pt was called and given [place] number [phone]      ----- Message -----  From: Michael Elyse  Sent: 4/18/2022   7:30 AM CDT  To: Em Rn Triage  Subject: FW: Are the heart doctors I had still with E*    Pt sent MyChart message to wrong pool, please advise.   ----- Message -----  From: Melinda Lobo  Sent: 4/16/2022   3:12 PM CDT  To: [facility]  Subject: Are the heart doctors I had still with Elu*    I sent a previous message and got a response about  one of the heart doctors who treated me. The appointment was canceled and I was supposed to reschedule the appointment with a number I was provided. Well I had an error in my  message and it was already sent and when I deleted my message it deleted all the related messages besides my reply. So now I don't have the number to make the appointment that I was given. Well are the doctors that were involved with my heart surgery now not being associated with [facility]?  I hope I can keep those doctors. One reason being is I praise those doctors and [facility] for the excellent care I received to all who I have a medical conversation with.

## 2022-05-14 ENCOUNTER — PATIENT MESSAGE (OUTPATIENT)
Dept: FAMILY MEDICINE CLINIC | Facility: CLINIC | Age: 72
End: 2022-05-14

## 2022-05-16 ENCOUNTER — NURSE TRIAGE (OUTPATIENT)
Dept: FAMILY MEDICINE CLINIC | Facility: CLINIC | Age: 72
End: 2022-05-16

## 2022-05-16 NOTE — TELEPHONE ENCOUNTER
Patient scheduled to see provider about his pain tomorrow:  Future Appointments   Date Time Provider Salvador Melchor   5/17/2022  9:00 AM DO LOCO [place]

## 2022-05-16 NOTE — TELEPHONE ENCOUNTER
This patient needs an evaluation regarding the etiology of his pain and the fact that his pain is resistant to narcotic pain medication is reason alone that he should be evaluated.

## 2022-05-16 NOTE — TELEPHONE ENCOUNTER
Called patient, confirmed name and .     Assisted to schedule an appointment:  Future Appointments   Date Time Provider Salvador Melchor   2022  9:00 AM DO LOCO [place]

## 2022-05-17 ENCOUNTER — OFFICE VISIT (OUTPATIENT)
Dept: FAMILY MEDICINE CLINIC | Facility: CLINIC | Age: 72
End: 2022-05-17
Payer: MEDICARE

## 2022-05-17 VITALS
DIASTOLIC BLOOD PRESSURE: 78 MMHG | WEIGHT: 211.25 LBS | BODY MASS INDEX: 33.95 KG/M2 | HEIGHT: 66 IN | SYSTOLIC BLOOD PRESSURE: 140 MMHG | HEART RATE: 55 BPM

## 2022-05-17 DIAGNOSIS — Z12.11 COLON CANCER SCREENING: ICD-10-CM

## 2022-05-17 DIAGNOSIS — M54.2 CERVICALGIA: Primary | ICD-10-CM

## 2022-05-17 PROCEDURE — 99214 OFFICE O/P EST MOD 30 MIN: CPT | Performed by: FAMILY MEDICINE

## 2022-05-17 RX ORDER — HYDROCODONE BITARTRATE AND ACETAMINOPHEN 5; 325 MG/1; MG/1
1 TABLET ORAL EVERY 6 HOURS PRN
Qty: 40 TABLET | Refills: 0 | Status: SHIPPED | OUTPATIENT
Start: 2022-05-17

## 2022-05-17 RX ORDER — HYDROCODONE BITARTRATE AND ACETAMINOPHEN 5; 325 MG/1; MG/1
1 TABLET ORAL EVERY 6 HOURS PRN
COMMUNITY
End: 2022-05-17

## 2022-05-17 NOTE — PATIENT INSTRUCTIONS
Pain management via prescription medications as needed. Comply with medications. Patient has been advised to continue with home remedies as needed. Additionally patient can start with 2.5 mg Valium that can be taken twice daily and if no other time at least at night. We will do a cervical x-ray (neck). Pending x-rays and the progress or lack thereof of the patient, he may benefit from physiatry specialty consultation.

## 2022-05-23 ENCOUNTER — HOSPITAL ENCOUNTER (OUTPATIENT)
Dept: GENERAL RADIOLOGY | Age: 72
Discharge: HOME | End: 2022-05-23
Attending: FAMILY MEDICINE
Payer: MEDICARE

## 2022-05-23 DIAGNOSIS — M54.2 CERVICALGIA: ICD-10-CM

## 2022-05-23 PROCEDURE — 72050 X-RAY EXAM NECK SPINE 4/5VWS: CPT | Performed by: FAMILY MEDICINE

## 2022-05-27 DIAGNOSIS — I10 ESSENTIAL HYPERTENSION: ICD-10-CM

## 2022-05-27 RX ORDER — METOPROLOL TARTRATE 100 MG/1
TABLET ORAL
Qty: 180 TABLET | Refills: 1 | Status: SHIPPED | OUTPATIENT
Start: 2022-05-27

## 2022-06-23 ENCOUNTER — TELEPHONE (OUTPATIENT)
Dept: PHYSICAL MEDICINE AND REHAB | Facility: CLINIC | Age: 72
End: 2022-06-23

## 2022-06-23 ENCOUNTER — OFFICE VISIT (OUTPATIENT)
Dept: PHYSICAL MEDICINE AND REHAB | Facility: CLINIC | Age: 72
End: 2022-06-23
Payer: MEDICARE

## 2022-06-23 VITALS
OXYGEN SATURATION: 97 % | DIASTOLIC BLOOD PRESSURE: 88 MMHG | HEART RATE: 60 BPM | WEIGHT: 211 LBS | BODY MASS INDEX: 33.91 KG/M2 | SYSTOLIC BLOOD PRESSURE: 150 MMHG | HEIGHT: 66 IN

## 2022-06-23 DIAGNOSIS — M48.062 SPINAL STENOSIS OF LUMBAR REGION WITH NEUROGENIC CLAUDICATION: ICD-10-CM

## 2022-06-23 DIAGNOSIS — M47.812 FACET ARTHROPATHY, CERVICAL: ICD-10-CM

## 2022-06-23 DIAGNOSIS — M54.2 CHRONIC NECK PAIN: Primary | ICD-10-CM

## 2022-06-23 DIAGNOSIS — R20.2 PARESTHESIAS: ICD-10-CM

## 2022-06-23 DIAGNOSIS — G89.29 CHRONIC NECK PAIN: Primary | ICD-10-CM

## 2022-06-23 PROCEDURE — 99204 OFFICE O/P NEW MOD 45 MIN: CPT | Performed by: PHYSICAL MEDICINE & REHABILITATION

## 2022-06-23 PROCEDURE — 1126F AMNT PAIN NOTED NONE PRSNT: CPT | Performed by: PHYSICAL MEDICINE & REHABILITATION

## 2022-06-23 NOTE — TELEPHONE ENCOUNTER
Spoke with Jolie Handy RN. Dr. Gerald Mora is seeing patients. Please help schedule patient for a new patient appointment.      If sooner appointment please with Roseann Rodriguez MD.

## 2022-06-23 NOTE — PATIENT INSTRUCTIONS
-Start physical therapy and home exercises  -Tylenol as needed for pain  -Ice/Heat as tolerated  -Follow up in 4 weeks  -Neurology consultation

## 2022-06-23 NOTE — TELEPHONE ENCOUNTER
Pt stated DR. Sarkis Velázquez referred him to Dr. Jeff Carbajal however Dr. Jeff Carbajal will not longer see patients.

## 2022-06-28 ENCOUNTER — NURSE TRIAGE (OUTPATIENT)
Dept: FAMILY MEDICINE CLINIC | Facility: CLINIC | Age: 72
End: 2022-06-28

## 2022-06-29 ENCOUNTER — OFFICE VISIT (OUTPATIENT)
Dept: OTOLARYNGOLOGY | Facility: CLINIC | Age: 72
End: 2022-06-29
Payer: MEDICARE

## 2022-06-29 ENCOUNTER — HOSPITAL ENCOUNTER (OUTPATIENT)
Age: 72
Discharge: HOME | End: 2022-06-29
Payer: MEDICARE

## 2022-06-29 VITALS
TEMPERATURE: 97 F | OXYGEN SATURATION: 97 % | HEART RATE: 82 BPM | DIASTOLIC BLOOD PRESSURE: 85 MMHG | RESPIRATION RATE: 20 BRPM | SYSTOLIC BLOOD PRESSURE: 156 MMHG

## 2022-06-29 VITALS — HEIGHT: 66 IN | TEMPERATURE: 99 F | WEIGHT: 211 LBS | BODY MASS INDEX: 33.91 KG/M2

## 2022-06-29 DIAGNOSIS — J98.9 AIRWAY PROBLEM: ICD-10-CM

## 2022-06-29 DIAGNOSIS — J36 PERITONSILLAR ABSCESS: Primary | ICD-10-CM

## 2022-06-29 DIAGNOSIS — J36 PERITONSILLAR CELLULITIS: Primary | ICD-10-CM

## 2022-06-29 LAB — S PYO AG THROAT QL: NEGATIVE

## 2022-06-29 PROCEDURE — 31575 DIAGNOSTIC LARYNGOSCOPY: CPT | Performed by: SPECIALIST

## 2022-06-29 PROCEDURE — 1125F AMNT PAIN NOTED PAIN PRSNT: CPT | Performed by: SPECIALIST

## 2022-06-29 PROCEDURE — 99212 OFFICE O/P EST SF 10 MIN: CPT | Performed by: NURSE PRACTITIONER

## 2022-06-29 PROCEDURE — 87880 STREP A ASSAY W/OPTIC: CPT | Performed by: NURSE PRACTITIONER

## 2022-06-29 PROCEDURE — 99214 OFFICE O/P EST MOD 30 MIN: CPT | Performed by: SPECIALIST

## 2022-06-29 RX ORDER — AMOXICILLIN AND CLAVULANATE POTASSIUM 875; 125 MG/1; MG/1
1 TABLET, FILM COATED ORAL EVERY 12 HOURS
Qty: 20 TABLET | Refills: 0 | Status: SHIPPED | OUTPATIENT
Start: 2022-06-29

## 2022-06-29 RX ORDER — METHYLPREDNISOLONE 4 MG/1
TABLET ORAL
Qty: 21 TABLET | Refills: 0 | Status: SHIPPED | OUTPATIENT
Start: 2022-06-29

## 2022-06-29 NOTE — ED INITIAL ASSESSMENT (HPI)
Pt came in due to sore throat for the past 4 days. Pt has easy non labored respirations. Pt stated he took several covid test and were all negative.

## 2022-06-29 NOTE — PATIENT INSTRUCTIONS
Have edema of the right parapharyngeal area. There is some very minimal edema at the base of the epiglottis. Placed on a Medrol Dosepak and Augmentin. To see you back in 1 days time, sooner if problems. You already have any worsening of your symptoms please go to the emergency room.

## 2022-06-30 ENCOUNTER — OFFICE VISIT (OUTPATIENT)
Dept: OTOLARYNGOLOGY | Facility: CLINIC | Age: 72
End: 2022-06-30
Payer: MEDICARE

## 2022-06-30 VITALS — BODY MASS INDEX: 32.78 KG/M2 | HEIGHT: 66 IN | WEIGHT: 204 LBS

## 2022-06-30 DIAGNOSIS — J36 PERITONSILLAR CELLULITIS: ICD-10-CM

## 2022-06-30 DIAGNOSIS — J36 TONSILLAR ABSCESS: Primary | ICD-10-CM

## 2022-06-30 PROCEDURE — 1125F AMNT PAIN NOTED PAIN PRSNT: CPT | Performed by: SPECIALIST

## 2022-06-30 PROCEDURE — 99213 OFFICE O/P EST LOW 20 MIN: CPT | Performed by: SPECIALIST

## 2022-06-30 NOTE — PATIENT INSTRUCTIONS
There is to be some draining from your right tonsil. As long as this continues, I do not think any further treatment other than the antibiotic and steroid will be needed. Follow-up in 1 week's time, sooner if problems.

## 2022-07-06 ENCOUNTER — OFFICE VISIT (OUTPATIENT)
Dept: NEUROLOGY | Facility: CLINIC | Age: 72
End: 2022-07-06
Payer: MEDICARE

## 2022-07-06 VITALS
BODY MASS INDEX: 32.78 KG/M2 | WEIGHT: 204 LBS | HEIGHT: 66 IN | SYSTOLIC BLOOD PRESSURE: 142 MMHG | DIASTOLIC BLOOD PRESSURE: 72 MMHG

## 2022-07-06 DIAGNOSIS — G45.9 TIA (TRANSIENT ISCHEMIC ATTACK): ICD-10-CM

## 2022-07-06 DIAGNOSIS — R20.0 LEFT SIDED NUMBNESS: Primary | ICD-10-CM

## 2022-07-06 DIAGNOSIS — R29.818 TRANSIENT NEUROLOGICAL SYMPTOMS: ICD-10-CM

## 2022-07-06 PROCEDURE — 99204 OFFICE O/P NEW MOD 45 MIN: CPT | Performed by: OTHER

## 2022-07-07 ENCOUNTER — OFFICE VISIT (OUTPATIENT)
Dept: OTOLARYNGOLOGY | Facility: CLINIC | Age: 72
End: 2022-07-07
Payer: MEDICARE

## 2022-07-07 VITALS — TEMPERATURE: 97 F

## 2022-07-07 DIAGNOSIS — J36 PERITONSILLAR CELLULITIS: ICD-10-CM

## 2022-07-07 DIAGNOSIS — J36 TONSILLAR ABSCESS: Primary | ICD-10-CM

## 2022-07-07 DIAGNOSIS — J98.9 AIRWAY PROBLEM: ICD-10-CM

## 2022-07-07 PROCEDURE — 99213 OFFICE O/P EST LOW 20 MIN: CPT | Performed by: SPECIALIST

## 2022-07-07 PROCEDURE — 1126F AMNT PAIN NOTED NONE PRSNT: CPT | Performed by: SPECIALIST

## 2022-07-07 NOTE — PATIENT INSTRUCTIONS
The infection is completely resolved. Finish your Augmentin and follow-up if there are any additional questions or problems.

## 2022-07-13 ENCOUNTER — HOSPITAL ENCOUNTER (OUTPATIENT)
Dept: CV DIAGNOSTICS | Facility: HOSPITAL | Age: 72
Discharge: HOME | End: 2022-07-13
Attending: Other
Payer: MEDICARE

## 2022-07-13 ENCOUNTER — TELEPHONE (OUTPATIENT)
Dept: NEUROLOGY | Facility: CLINIC | Age: 72
End: 2022-07-13

## 2022-07-13 DIAGNOSIS — R20.0 LEFT SIDED NUMBNESS: ICD-10-CM

## 2022-07-13 DIAGNOSIS — G45.9 TIA (TRANSIENT ISCHEMIC ATTACK): ICD-10-CM

## 2022-07-13 DIAGNOSIS — R29.818 TRANSIENT NEUROLOGICAL SYMPTOMS: ICD-10-CM

## 2022-07-13 PROCEDURE — 93306 TTE W/DOPPLER COMPLETE: CPT | Performed by: OTHER

## 2022-07-13 PROCEDURE — 93225 XTRNL ECG REC<48 HRS REC: CPT | Performed by: OTHER

## 2022-07-13 NOTE — TELEPHONE ENCOUNTER
----- Message from Mendoza Arias MD sent at 7/13/2022 12:22 PM CDT -----  Please let the patient know that echocardiogram evidence of a shunt. Lets make sure he follows up with his cardiologist to review the findings.

## 2022-07-13 NOTE — PROGRESS NOTES
Please let the patient know that echocardiogram evidence of a shunt. Lets make sure he follows up with his cardiologist to review the findings.

## 2022-07-14 NOTE — TELEPHONE ENCOUNTER
Spoke to patient and notified him of below. He was understanding and will follow-up with Dr. Beronica Galvez. Faxed report to Dr. Mirna Baker office at [phone].

## 2022-07-21 ENCOUNTER — TELEPHONE (OUTPATIENT)
Dept: NEUROLOGY | Facility: CLINIC | Age: 72
End: 2022-07-21

## 2022-07-21 NOTE — TELEPHONE ENCOUNTER
----- Message from Daisy Guerrero MD sent at 7/21/2022  6:32 AM CDT -----  Please let the patient know that cardiac monitor didn't show signs of the a. Fib that could have contributed to the TIA/stroke symptoms.

## 2022-07-27 ENCOUNTER — LAB ENCOUNTER (OUTPATIENT)
Dept: LAB | Age: 72
End: 2022-07-27
Attending: Other
Payer: MEDICARE

## 2022-07-27 ENCOUNTER — HOSPITAL ENCOUNTER (OUTPATIENT)
Dept: MRI IMAGING | Age: 72
Discharge: HOME | End: 2022-07-27
Attending: Other
Payer: MEDICARE

## 2022-07-27 ENCOUNTER — TELEPHONE (OUTPATIENT)
Dept: NEUROLOGY | Facility: CLINIC | Age: 72
End: 2022-07-27

## 2022-07-27 DIAGNOSIS — G45.9 TIA (TRANSIENT ISCHEMIC ATTACK): ICD-10-CM

## 2022-07-27 DIAGNOSIS — R20.0 LEFT SIDED NUMBNESS: ICD-10-CM

## 2022-07-27 LAB
CHOLEST SERPL-MCNC: 149 MG/DL (ref ?–200)
FASTING PATIENT LIPID ANSWER: YES
HDLC SERPL-MCNC: 46 MG/DL (ref 40–59)
LDLC SERPL CALC-MCNC: 76 MG/DL (ref ?–100)
NONHDLC SERPL-MCNC: 103 MG/DL (ref ?–130)
TRIGL SERPL-MCNC: 154 MG/DL (ref 30–149)
VLDLC SERPL CALC-MCNC: 24 MG/DL (ref 0–30)

## 2022-07-27 PROCEDURE — A9575 INJ GADOTERATE MEGLUMI 0.1ML: HCPCS | Performed by: OTHER

## 2022-07-27 PROCEDURE — 70553 MRI BRAIN STEM W/O & W/DYE: CPT | Performed by: OTHER

## 2022-07-27 PROCEDURE — 36415 COLL VENOUS BLD VENIPUNCTURE: CPT

## 2022-07-27 PROCEDURE — 80061 LIPID PANEL: CPT

## 2022-07-27 NOTE — TELEPHONE ENCOUNTER
----- Message from Belen Fermin MD sent at 7/27/2022  3:09 PM CDT -----  Please let the patient know that results of these particular lab tests so far were normal.  Except slightly higher than the goal of LDL. We can adjust statin as result or we can try to improve the diet further.     Thank you

## 2022-07-27 NOTE — TELEPHONE ENCOUNTER
Called & spoke to patient to notify of lab result as noted below. Patient prefers to improve his diet before trying to adjust any medication.      Pt wanted to let MD know, MRI scheduled for today 7/27/22

## 2022-07-29 ENCOUNTER — HOSPITAL ENCOUNTER (OUTPATIENT)
Dept: ULTRASOUND IMAGING | Facility: HOSPITAL | Age: 72
Discharge: HOME | End: 2022-07-29
Attending: Other
Payer: MEDICARE

## 2022-07-29 DIAGNOSIS — G45.9 TIA (TRANSIENT ISCHEMIC ATTACK): ICD-10-CM

## 2022-07-29 PROCEDURE — 93880 EXTRACRANIAL BILAT STUDY: CPT | Performed by: OTHER

## 2022-08-01 ENCOUNTER — TELEPHONE (OUTPATIENT)
Dept: NEUROLOGY | Facility: CLINIC | Age: 72
End: 2022-08-01

## 2022-08-01 NOTE — TELEPHONE ENCOUNTER
Left detailed message for patient notifying him of below. Asked him to call the office if she had questions.

## 2022-08-01 NOTE — TELEPHONE ENCOUNTER
----- Message from Zoila Delvalle MD sent at 8/1/2022  8:46 AM CDT -----  Please let the patient know that doppler of carotids didn't show significant stenosis (narrowing).     Thank you

## 2022-08-10 ENCOUNTER — OFFICE VISIT (OUTPATIENT)
Dept: NEUROLOGY | Facility: CLINIC | Age: 72
End: 2022-08-10
Payer: MEDICARE

## 2022-08-10 VITALS
HEIGHT: 66 IN | WEIGHT: 210 LBS | HEART RATE: 70 BPM | SYSTOLIC BLOOD PRESSURE: 126 MMHG | BODY MASS INDEX: 33.75 KG/M2 | DIASTOLIC BLOOD PRESSURE: 64 MMHG

## 2022-08-10 DIAGNOSIS — R29.818 TRANSIENT NEUROLOGICAL SYMPTOMS: ICD-10-CM

## 2022-08-10 DIAGNOSIS — G45.9 TIA (TRANSIENT ISCHEMIC ATTACK): ICD-10-CM

## 2022-08-10 DIAGNOSIS — R20.0 LEFT SIDED NUMBNESS: Primary | ICD-10-CM

## 2022-08-10 PROCEDURE — 99214 OFFICE O/P EST MOD 30 MIN: CPT | Performed by: OTHER

## 2022-08-12 NOTE — TELEPHONE ENCOUNTER
Dr Hammad Nichole, pharmacy calling to report that Amlodipine with simvastatin may increase the risk of side effects.  They would like to know if you want them to fill increased dose simvastatin 40mg  Please reply to pool: MAHENDRA Morin Pressure channel 1 zeroed.

## 2022-08-22 ENCOUNTER — HOSPITAL ENCOUNTER (OUTPATIENT)
Dept: ELECTROPHYSIOLOGY | Facility: HOSPITAL | Age: 72
Discharge: HOME | End: 2022-08-22
Attending: Other
Payer: MEDICARE

## 2022-08-22 PROCEDURE — 95717 EEG PHYS/QHP 2-12 HR W/O VID: CPT | Performed by: OTHER

## 2022-08-23 ENCOUNTER — HOSPITAL ENCOUNTER (OUTPATIENT)
Dept: ELECTROPHYSIOLOGY | Facility: HOSPITAL | Age: 72
Discharge: HOME | End: 2022-08-23
Attending: Other
Payer: MEDICARE

## 2022-08-23 PROCEDURE — 95708 EEG WO VID EA 12-26HR UNMNTR: CPT

## 2022-08-24 ENCOUNTER — HOSPITAL ENCOUNTER (OUTPATIENT)
Dept: ELECTROPHYSIOLOGY | Facility: HOSPITAL | Age: 72
Discharge: HOME | End: 2022-08-24
Attending: Other
Payer: MEDICARE

## 2022-08-25 NOTE — PROCEDURES
[address] [facility] [address]      PATIENT'S NAME: Poly Sierra   ATTENDING PHYSICIAN: Florencia Prabhakar MD   PATIENT ACCOUNT #: [de-identified] LOCATION: [facility]   MEDICAL RECORD #: [id number] YOB: 1950   DATE OF SERVICE: 08/22/2022       ELECTROENCEPHALOGRAM REPORT    DATE OF EXAMINATION:  08/22/2022  AGE: 67 Yrs. SEX: M   EEG #:      INTERPRETATION:  This is an ambulatory EEG listed as 48 hours, but there is only 10-hour recording. Background activity is low amplitude, 10 Hz alpha activity, symmetrical and attenuates symmetrically with eye opening. Drowsiness recorded. No stage 2 sleep. No mention of any neurological symptoms. IMPRESSION:  Normal ambulatory 10-hour EEG recording.     Dictated By Lucero Douglas MD  d: 08/24/2022 17:42:14  t: 08/24/2022 18:44:03  Job [id number]  PBV/

## 2022-08-26 NOTE — PATIENT INSTRUCTIONS
-Increase Toprol-XL to 50 mg twice a day (instead of once a day)  -Continue aspirin and your other heart medications  -Our office will work with you to schedule an angiogram show

## 2022-08-30 DIAGNOSIS — K21.9 GASTROESOPHAGEAL REFLUX DISEASE: ICD-10-CM

## 2022-08-30 DIAGNOSIS — I10 ESSENTIAL HYPERTENSION: ICD-10-CM

## 2022-09-01 DIAGNOSIS — K21.9 GASTROESOPHAGEAL REFLUX DISEASE: ICD-10-CM

## 2022-09-01 DIAGNOSIS — I10 ESSENTIAL HYPERTENSION: ICD-10-CM

## 2022-09-01 RX ORDER — ATORVASTATIN CALCIUM 40 MG/1
TABLET, FILM COATED ORAL
Qty: 90 TABLET | Refills: 3 | Status: SHIPPED | OUTPATIENT
Start: 2022-09-01

## 2022-09-01 RX ORDER — OMEPRAZOLE 40 MG/1
CAPSULE, DELAYED RELEASE ORAL
Qty: 90 CAPSULE | Refills: 3 | Status: SHIPPED | OUTPATIENT
Start: 2022-09-01

## 2022-09-01 RX ORDER — VALSARTAN AND HYDROCHLOROTHIAZIDE 320; 25 MG/1; MG/1
1 TABLET, FILM COATED ORAL DAILY
Qty: 90 TABLET | Refills: 3 | Status: SHIPPED | OUTPATIENT
Start: 2022-09-01

## 2022-09-01 NOTE — TELEPHONE ENCOUNTER
Refill passed per [facility] protocol. Requested Prescriptions   Pending Prescriptions Disp Refills    VALSARTAN-HYDROCHLOROTHIAZIDE 320-25 MG Oral Tab [Pharmacy Med Name: Valsartan-hydroCHLOROthiazide 320-25 MG Oral Tablet] 90 tablet 3     Sig: TAKE 1 TABLET BY MOUTH  DAILY        Hypertensive Medications Protocol Failed - 9/1/2022  4:56 PM        Failed - CMP or BMP in past 6 months     No results found for this or any previous visit (from the past 4392 hour(s)). Passed - In person appointment in the past 12 or next 3 months       Recent Outpatient Visits              3 weeks ago Left sided numbness    [facility] Theresa Perez MD    Office Visit    1 month ago Tonsillar abscess    [facility] for Trena Conch, MD    Office Visit    1 month ago Left sided numbness    [facility] Theresa Perez MD    Office Visit    2 months ago Tonsillar abscess    [address] MD    Office Visit    2 months ago Peritonsillar cellulitis    [facility] for Trena Conch, MD    Office Visit     Future Appointments         Provider Department Appt Notes    In 1 week Theresa Preez MD [facility] Return in about 4 weeks (around 9/7/2022).                Passed - Last BP reading less than 140/90     BP Readings from Last 1 Encounters:  08/10/22 : 126/64                Passed - In person appointment or virtual visit in the past 6 months       Recent Outpatient Visits              3 weeks ago Left sided numbness    [facility] Theresa Perez MD    Office Visit    1 month ago Tonsillar abscess    [facility] for Trena Conch, MD    Office Visit    1 month ago Left sided numbness    Sherri Armstrong MD Office Visit    2 months ago Tonsillar abscess    [address] MD    Office Visit    2 months ago Peritonsillar cellulitis    [facility] for Darnell Later, MD    Office Visit     Future Appointments         Provider Department Appt Notes    In 1 week Lina Johnson MD [facility] Return in about 4 weeks (around 9/7/2022). Passed - GFR > 50     No results found for: EGFRCR                 ATORVASTATIN 40 MG Oral Tab [Pharmacy Med Name: Atorvastatin Calcium 40 MG Oral Tablet] 90 tablet 3     Sig: TAKE 1 TABLET BY MOUTH AT  NIGHT        Cholesterol Medication Protocol Passed - 9/1/2022  4:56 PM        Passed - ALT in past 12 months        Passed - LDL in past 12 months        Passed - Last ALT < 80       Lab Results   Component Value Date    ALT 17 09/10/2021             Passed - Last LDL < 130     Lab Results   Component Value Date    LDL 76 07/27/2022               Passed - In person appointment or virtual visit in the past 12 mos or appointment in next 3 mos       Recent Outpatient Visits              3 weeks ago Left sided numbness    [facility] Lina Johnson MD    Office Visit    1 month ago Tonsillar abscess    [facility] for Darnell Later, MD    Office Visit    1 month ago Left sided numbness    [facility] Lina Johnson MD    Office Visit    2 months ago Tonsillar abscess    [address] MD    Office Visit    2 months ago Peritonsillar cellulitis    [facility] for Darnell Later, MD    Office Visit     Future Appointments         Provider Department Appt Notes    In 1 week Lina Johnson MD [facility] Return in about 4 weeks (around 9/7/2022).                   OMEPRAZOLE 40 MG Oral Capsule Delayed Release [Pharmacy Med Name: Omeprazole 40 MG Oral Capsule Delayed Release] 90 capsule 3     Sig: TAKE 1 CAPSULE BY MOUTH  DAILY        Gastrointestional Medication Protocol Passed - 9/1/2022  4:56 PM        Passed - In person appointment or virtual visit in the past 12 mos or appointment in next 3 mos       Recent Outpatient Visits              3 weeks ago Left sided numbness    [facility] Real Porter MD    Office Visit    1 month ago Tonsillar abscess    [facility] for Sheree Morrison, Raudel Beverly MD    Office Visit    1 month ago Left sided numbness    [facility] Real Porter MD    Office Visit    2 months ago Tonsillar abscess    [address] MD    Office Visit    2 months ago Peritonsillar cellulitis    [facility] for Kesha Gamez MD    Office Visit     Future Appointments         Provider Department Appt Notes    In 1 week Real Porter MD [facility] Return in about 4 weeks (around 9/7/2022). Future Appointments         Provider Department Appt Notes    In 1 week Real Porter MD [facility] Return in about 4 weeks (around 9/7/2022).              Recent Outpatient Visits              3 weeks ago Left sided numbness    [facility] Real Porter MD    Office Visit    1 month ago Tonsillar abscess    [facility] for Kesha Gamez MD    Office Visit    1 month ago Left sided numbness    [facility] Real Porter MD    Office Visit    2 months ago Tonsillar abscess    [address] MD    Office Visit    2 months ago Peritonsillar cellulitis    [facility] [address] [facility] Tyesha Rollins MD    Office Visit

## 2022-09-01 NOTE — TELEPHONE ENCOUNTER
Atorvastatin and Omeprazole passes protocol but per pharmacy note, patient is requesting a year supply. Please review; protocol failed/no protocol. Requested Prescriptions   Pending Prescriptions Disp Refills    VALSARTAN-HYDROCHLOROTHIAZIDE 320-25 MG Oral Tab [Pharmacy Med Name: Valsartan-hydroCHLOROthiazide 320-25 MG Oral Tablet] 90 tablet 3     Sig: TAKE 1 TABLET BY MOUTH  DAILY        Hypertensive Medications Protocol Failed - 9/1/2022  4:56 PM        Failed - CMP or BMP in past 6 months     No results found for this or any previous visit (from the past 4392 hour(s)). Passed - In person appointment in the past 12 or next 3 months       Recent Outpatient Visits              3 weeks ago Left sided numbness    [facility] Pat Gaytan MD    Office Visit    1 month ago Tonsillar abscess    [facility] for Maricela Natter, MD    Office Visit    1 month ago Left sided numbness    [facility] Pat Gaytan MD    Office Visit    2 months ago Tonsillar abscess    [address] Y[address] Riley MD    Office Visit    2 months ago Peritonsillar cellulitis    [facility] for Maricela Natter, MD    Office Visit     Future Appointments         Provider Department Appt Notes    In 1 week Pat Gaytan MD [facility] Return in about 4 weeks (around 9/7/2022).                Passed - Last BP reading less than 140/90     BP Readings from Last 1 Encounters:  08/10/22 : 126/64                Passed - In person appointment or virtual visit in the past 6 months       Recent Outpatient Visits              3 weeks ago Left sided numbness    [facility] Pat Gaytan MD    Office Visit    1 month ago Tonsillar abscess    [facility] for Maricela Natter, MD Office Visit    1 month ago Left sided numbness    [facility] Sherin Altamirano MD    Office Visit    2 months ago Tonsillar abscess    Linda Woods MD    Office Visit    2 months ago Peritonsillar cellulitis    [facility] for Emmie Wren MD    Office Visit     Future Appointments         Provider Department Appt Notes    In 1 week Sherin Altamirano MD [facility] Return in about 4 weeks (around 9/7/2022).                Passed - GFR > 50     No results found for: EGFRCR                 ATORVASTATIN 40 MG Oral Tab [Pharmacy Med Name: Atorvastatin Calcium 40 MG Oral Tablet] 90 tablet 3     Sig: TAKE 1 TABLET BY MOUTH AT  NIGHT        Cholesterol Medication Protocol Passed - 9/1/2022  4:56 PM        Passed - ALT in past 12 months        Passed - LDL in past 12 months        Passed - Last ALT < 80       Lab Results   Component Value Date    ALT 17 09/10/2021             Passed - Last LDL < 130     Lab Results   Component Value Date    LDL 76 07/27/2022               Passed - In person appointment or virtual visit in the past 12 mos or appointment in next 3 mos       Recent Outpatient Visits              3 weeks ago Left sided numbness    [facility] Sherin Altamirano MD    Office Visit    1 month ago Tonsillar abscess    [facility] walter Wren MD    Office Visit    1 month ago Left sided numbness    [facility] Sherin Altamirano MD    Office Visit    2 months ago Tonsillar abscess    Linda Woods MD    Office Visit    2 months ago Peritonsillar cellulitis    [facility] walter Wren MD    Office Visit     Future Appointments         Provider Department Appt Notes    In 1 week Sherin Altamirano MD [facility] Return in about 4 weeks (around 9/7/2022). OMEPRAZOLE 40 MG Oral Capsule Delayed Release [Pharmacy Med Name: Omeprazole 40 MG Oral Capsule Delayed Release] 90 capsule 3     Sig: TAKE 1 CAPSULE BY MOUTH  DAILY        Gastrointestional Medication Protocol Passed - 9/1/2022  4:56 PM        Passed - In person appointment or virtual visit in the past 12 mos or appointment in next 3 mos       Recent Outpatient Visits              3 weeks ago Left sided numbness    [facility] Liam Vizcarra MD    Office Visit    1 month ago Tonsillar abscess    [facility] for Starlette Fines, Llana Kobus, MD    Office Visit    1 month ago Left sided numbness    [facility] Liam Vizcarra MD    Office Visit    2 months ago Tonsillar abscess    [address] MD    Office Visit    2 months ago Peritonsillar cellulitis    [facility] for Tish Chars, MD    Office Visit     Future Appointments         Provider Department Appt Notes    In 1 week Liam Vizcarra MD [facility] Return in about 4 weeks (around 9/7/2022).                       Recent Outpatient Visits              3 weeks ago Left sided numbness    [facility] Liam Vizcarra MD    Office Visit    1 month ago Tonsillar abscess    [facility] for Tish Chars, MD    Office Visit    1 month ago Left sided camelianess    Felipa Negro MD    Office Visit    2 months ago Tonsillar abscess    [address] MD    Office Visit    2 months ago Peritonsillar cellulitis    [facility] for Starlette Fines, Llana Kobus, MD    Office Visit             Future Appointments Provider Department Appt Notes    In 1 week Ana Rosa Martinez MD [facility] Return in about 4 weeks (around 9/7/2022).

## 2022-09-05 RX ORDER — VALSARTAN AND HYDROCHLOROTHIAZIDE 320; 25 MG/1; MG/1
1 TABLET, FILM COATED ORAL DAILY
Qty: 90 TABLET | Refills: 3 | OUTPATIENT
Start: 2022-09-05

## 2022-09-05 RX ORDER — OMEPRAZOLE 40 MG/1
CAPSULE, DELAYED RELEASE ORAL
Qty: 90 CAPSULE | Refills: 3 | OUTPATIENT
Start: 2022-09-05

## 2022-09-05 RX ORDER — ATORVASTATIN CALCIUM 40 MG/1
TABLET, FILM COATED ORAL
Qty: 90 TABLET | Refills: 3 | OUTPATIENT
Start: 2022-09-05

## 2022-09-09 ENCOUNTER — PATIENT MESSAGE (OUTPATIENT)
Dept: NEUROLOGY | Facility: CLINIC | Age: 72
End: 2022-09-09

## 2022-09-09 NOTE — TELEPHONE ENCOUNTER
From: Salome Tilley  To: Savannah Alves MD  Sent: 9/9/2022 9:55 AM CDT  Subject: EEG Test Results. .. ON Aug 23 I had the EEG and now it has been 12 week days since the test and the results still are not posted. Can you explain why the results from the test are still not available?

## 2022-09-09 NOTE — TELEPHONE ENCOUNTER
I do not have answers as to why report states only 10 hours even though he had 48-hour EEG done. Please refer to administration to find that out.     That report for 10 hours states it was normal.

## 2022-09-09 NOTE — TELEPHONE ENCOUNTER
Message to Dr. Aleyda Rodrigues to please review and advise on the results. Thanks. Reviewed and electronically signed by:  [facility], [address] [place] [address], [facility]

## 2022-09-10 ENCOUNTER — PATIENT MESSAGE (OUTPATIENT)
Dept: NEUROLOGY | Facility: CLINIC | Age: 72
End: 2022-09-10

## 2022-09-12 ENCOUNTER — PATIENT MESSAGE (OUTPATIENT)
Dept: NEUROLOGY | Facility: CLINIC | Age: 72
End: 2022-09-12

## 2022-09-12 DIAGNOSIS — R29.818 TRANSIENT NEUROLOGICAL SYMPTOMS: Primary | ICD-10-CM

## 2022-09-12 NOTE — TELEPHONE ENCOUNTER
From: Elva Quinteros  To: Jose J Hernandez MD  Sent: 9/9/2022 9:55 AM CDT  Subject: EEG Test Results. .. ON Aug 23 I had the EEG and now it has been 12 week days since the test and the results still are not posted. Can you explain why the results from the test are still not available?

## 2022-09-13 RX ORDER — TOPIRAMATE 25 MG/1
TABLET ORAL
Qty: 90 TABLET | Refills: 3 | Status: SHIPPED | OUTPATIENT
Start: 2022-09-13

## 2022-09-13 NOTE — TELEPHONE ENCOUNTER
From: Jazmin Gomes  To: Ana María Craft MD  Sent: 9/9/2022 9:55 AM CDT  Subject: EEG Test Results. .. ON Aug 23 I had the EEG and now it has been 12 week days since the test and the results still are not posted. Can you explain why the results from the test are still not available?

## 2022-09-13 NOTE — TELEPHONE ENCOUNTER
Lets try topiramate slowly tapering it off. Potential side effects include fogginess, tingling in the feet, loss of appetite.    -Follow-up in few weeks.

## 2022-10-06 ENCOUNTER — OFFICE VISIT (OUTPATIENT)
Dept: NEUROLOGY | Facility: CLINIC | Age: 72
End: 2022-10-06
Payer: MEDICARE

## 2022-10-06 VITALS
SYSTOLIC BLOOD PRESSURE: 124 MMHG | HEART RATE: 56 BPM | HEIGHT: 66 IN | BODY MASS INDEX: 33.75 KG/M2 | DIASTOLIC BLOOD PRESSURE: 74 MMHG | WEIGHT: 210 LBS

## 2022-10-06 DIAGNOSIS — G45.9 TIA (TRANSIENT ISCHEMIC ATTACK): ICD-10-CM

## 2022-10-06 DIAGNOSIS — R20.0 LEFT SIDED NUMBNESS: ICD-10-CM

## 2022-10-06 DIAGNOSIS — R29.818 TRANSIENT NEUROLOGICAL SYMPTOMS: Primary | ICD-10-CM

## 2022-10-10 RX ORDER — AMLODIPINE BESYLATE 5 MG/1
5 TABLET ORAL DAILY
Qty: 90 TABLET | Refills: 1 | Status: SHIPPED | OUTPATIENT
Start: 2022-10-10

## 2022-10-10 NOTE — TELEPHONE ENCOUNTER
Please review. Protocol failed. No future appointments. Requested Prescriptions   Pending Prescriptions Disp Refills    amLODIPine 5 MG Oral Tab 90 tablet 1     Sig: Take 1 tablet (5 mg total) by mouth daily. Hypertensive Medications Protocol Failed - 10/10/2022 11:46 AM        Failed - CMP or BMP in past 6 months     No results found for this or any previous visit (from the past 4392 hour(s)).               Failed - EGFRCR or GFRNAA > 50     GFR Evaluation              Passed - In person appointment in the past 12 or next 3 months       Recent Outpatient Visits              4 days ago Transient neurological symptoms    [facility] Theresa Perez MD    Office Visit    2 months ago Left sided numbness    [facility] Theresa Perez MD    Office Visit    3 months ago Tonsillar abscess    [facility] for Trena Conch, MD    Office Visit    3 months ago Left sided numbness    [facility] Theresa Perez MD    Office Visit    3 months ago Tonsillar abscess    [facility] Franks MD    Office Visit                 Passed - Last BP reading less than 140/90     BP Readings from Last 1 Encounters:  10/06/22 : 124/74                Passed - In person appointment or virtual visit in the past 6 months       Recent Outpatient Visits              4 days ago Transient neurological symptoms    Sherri Armstrong MD    Office Visit    2 months ago Left sided numbness    [facility] Theresa Perez MD    Office Visit    3 months ago Tonsillar abscess    [facility] for Trena Conch, MD    Office Visit    3 months ago Left sided numbness    [facility] Theresa Perez MD    Office Visit    3 months ago Tonsillar abscess    [place] Sergey Bonner MD    Office Visit                       Recent Outpatient Visits              4 days ago Transient neurological symptoms    [facility] Zoila Apodaca MD    Office Visit    2 months ago Left sided numbness    [facility] Zoila Apodaca MD    Office Visit    3 months ago Tonsillar abscess    [facility] for Parag Haq MD    Office Visit    3 months ago Left sided numbness    [facility] Zoila Apodaca MD    Office Visit    3 months ago Tonsillar abscess    [facility] Levo, Danae De Leon MD    Office Visit

## 2022-10-10 NOTE — TELEPHONE ENCOUNTER
----- Message from Neymar Anderson RN sent at 10/10/2022 11:03 AM CDT -----  Regarding: FW: Amlodipine refill needed . ----- Message -----  From: Wilfrido Monaco  Sent: 10/9/2022   5:46 PM CDT  To: Em Rn Triage  Subject: Amlodipine refill needed . It was refilled lsst time when I had my heart surgery so it is not available in my chart for renewal.  Please renew it.

## 2022-12-16 DIAGNOSIS — I10 ESSENTIAL HYPERTENSION: ICD-10-CM

## 2022-12-17 RX ORDER — METOPROLOL TARTRATE 100 MG/1
100 TABLET ORAL 2 TIMES DAILY
Qty: 180 TABLET | Refills: 1 | Status: SHIPPED | OUTPATIENT
Start: 2022-12-17

## 2022-12-23 ENCOUNTER — PATIENT MESSAGE (OUTPATIENT)
Dept: NEUROLOGY | Facility: CLINIC | Age: 72
End: 2022-12-23

## 2022-12-23 NOTE — TELEPHONE ENCOUNTER
From: Zoe Knapp  To: Chana Winchester MD  Sent: 12/23/2022 9:59 AM CST  Subject: About my Numbing Episodes    I just wanted to update you on my reoccurring episodes. Now I am having multiple days where I do not have one. And many now are becoming milder in intensity/ The damaged nerve must be healing.

## 2023-02-18 RX ORDER — AMLODIPINE BESYLATE 5 MG/1
TABLET ORAL
Qty: 90 TABLET | Refills: 3 | Status: SHIPPED | OUTPATIENT
Start: 2023-02-18

## 2023-02-20 ENCOUNTER — HOSPITAL ENCOUNTER (EMERGENCY)
Facility: HOSPITAL | Age: 73
Discharge: HOME | End: 2023-02-20
Attending: EMERGENCY MEDICINE
Payer: MEDICARE

## 2023-02-20 ENCOUNTER — APPOINTMENT (OUTPATIENT)
Dept: ULTRASOUND IMAGING | Facility: HOSPITAL | Age: 73
End: 2023-02-20
Attending: EMERGENCY MEDICINE
Payer: MEDICARE

## 2023-02-20 VITALS
DIASTOLIC BLOOD PRESSURE: 77 MMHG | WEIGHT: 204 LBS | BODY MASS INDEX: 33 KG/M2 | OXYGEN SATURATION: 97 % | SYSTOLIC BLOOD PRESSURE: 134 MMHG | HEART RATE: 68 BPM | RESPIRATION RATE: 22 BRPM | TEMPERATURE: 97 F

## 2023-02-20 DIAGNOSIS — M10.9 ACUTE GOUT OF RIGHT ANKLE, UNSPECIFIED CAUSE: Primary | ICD-10-CM

## 2023-02-20 PROCEDURE — 99285 EMERGENCY DEPT VISIT HI MDM: CPT

## 2023-02-20 PROCEDURE — 93971 EXTREMITY STUDY: CPT | Performed by: EMERGENCY MEDICINE

## 2023-02-20 PROCEDURE — 99284 EMERGENCY DEPT VISIT MOD MDM: CPT

## 2023-02-20 RX ORDER — PREDNISONE 20 MG/1
60 TABLET ORAL ONCE
Status: COMPLETED | OUTPATIENT
Start: 2023-02-20 | End: 2023-02-20

## 2023-02-20 RX ORDER — ACETAMINOPHEN AND CODEINE PHOSPHATE 300; 30 MG/1; MG/1
1 TABLET ORAL ONCE
Status: COMPLETED | OUTPATIENT
Start: 2023-02-20 | End: 2023-02-20

## 2023-02-20 RX ORDER — PREDNISONE 20 MG/1
40 TABLET ORAL DAILY
Qty: 10 TABLET | Refills: 0 | Status: SHIPPED | OUTPATIENT
Start: 2023-02-20 | End: 2023-02-25

## 2023-02-20 RX ORDER — ACETAMINOPHEN AND CODEINE PHOSPHATE 300; 30 MG/1; MG/1
1 TABLET ORAL EVERY 6 HOURS PRN
Qty: 15 TABLET | Refills: 0 | Status: SHIPPED | OUTPATIENT
Start: 2023-02-20 | End: 2023-02-25

## 2023-04-07 ENCOUNTER — TELEPHONE (OUTPATIENT)
Dept: FAMILY MEDICINE CLINIC | Facility: CLINIC | Age: 73
End: 2023-04-07

## 2023-04-07 NOTE — TELEPHONE ENCOUNTER
Spoke to patient who stated he will call and schedule colonoscopy, phone number provided. Pt will call back to schedule Medicare annual physical.

## 2023-04-11 NOTE — TELEPHONE ENCOUNTER
CC/Reason For referral: 89 Yr female in very good health pt was treated at urgent care center for UTI given antibiotics and developed lower abd pain with constipation, antibiotics since have been discontinued and pain continues  Preferred Consultant(if applicable): any  Who admits for you (if needed):any  Do you have documents you would like to fax over?no   Would you still like to speak to an ED attending? if needed at [phone] Please see response on first message.

## 2023-06-06 ENCOUNTER — OFFICE VISIT (OUTPATIENT)
Dept: FAMILY MEDICINE CLINIC | Facility: CLINIC | Age: 73
End: 2023-06-06

## 2023-06-06 VITALS
HEART RATE: 76 BPM | DIASTOLIC BLOOD PRESSURE: 78 MMHG | SYSTOLIC BLOOD PRESSURE: 130 MMHG | BODY MASS INDEX: 33.91 KG/M2 | HEIGHT: 66 IN | WEIGHT: 211 LBS

## 2023-06-06 DIAGNOSIS — I42.9 CARDIOMYOPATHY, UNSPECIFIED TYPE (HCC): ICD-10-CM

## 2023-06-06 DIAGNOSIS — I25.10 CORONARY ARTERY DISEASE INVOLVING NATIVE CORONARY ARTERY OF NATIVE HEART WITHOUT ANGINA PECTORIS: Chronic | ICD-10-CM

## 2023-06-06 DIAGNOSIS — R14.2 BELCHING SYMPTOM: ICD-10-CM

## 2023-06-06 DIAGNOSIS — I77.89 ASCENDING AORTA ENLARGEMENT (HCC): ICD-10-CM

## 2023-06-06 DIAGNOSIS — Z95.1 HX OF CABG: ICD-10-CM

## 2023-06-06 DIAGNOSIS — I10 ESSENTIAL HYPERTENSION: ICD-10-CM

## 2023-06-06 DIAGNOSIS — Z12.11 COLON CANCER SCREENING: Primary | ICD-10-CM

## 2023-06-06 PROCEDURE — 99214 OFFICE O/P EST MOD 30 MIN: CPT | Performed by: FAMILY MEDICINE

## 2023-06-06 NOTE — PATIENT INSTRUCTIONS
Recommend weight loss via daily exercising and consistent healthy dietary changes. Encouraged physical fitness and daily physical activity daily. Monitor blood pressures and record at home. Limit salt intake. Medication reviewed and renewed where needed and appropriate. Comply with medications.

## 2023-06-20 ENCOUNTER — LAB ENCOUNTER (OUTPATIENT)
Dept: LAB | Age: 73
End: 2023-06-20
Attending: FAMILY MEDICINE
Payer: MEDICARE

## 2023-06-20 ENCOUNTER — OFFICE VISIT (OUTPATIENT)
Dept: FAMILY MEDICINE CLINIC | Facility: CLINIC | Age: 73
End: 2023-06-20

## 2023-06-20 VITALS
WEIGHT: 210 LBS | HEART RATE: 63 BPM | DIASTOLIC BLOOD PRESSURE: 64 MMHG | BODY MASS INDEX: 33.75 KG/M2 | TEMPERATURE: 97 F | SYSTOLIC BLOOD PRESSURE: 138 MMHG | HEIGHT: 66 IN | OXYGEN SATURATION: 94 %

## 2023-06-20 DIAGNOSIS — M25.50 ARTHRALGIA, UNSPECIFIED JOINT: Primary | ICD-10-CM

## 2023-06-20 DIAGNOSIS — M25.422 SWELLING OF JOINT OF UPPER ARM, LEFT: Primary | ICD-10-CM

## 2023-06-20 DIAGNOSIS — Z12.5 ENCOUNTER FOR PROSTATE CANCER SCREENING: ICD-10-CM

## 2023-06-20 DIAGNOSIS — Z00.00 MEDICARE ANNUAL WELLNESS VISIT, INITIAL: ICD-10-CM

## 2023-06-20 DIAGNOSIS — R73.02 IMPAIRED GLUCOSE TOLERANCE: ICD-10-CM

## 2023-06-20 DIAGNOSIS — Z12.11 SCREENING FOR COLON CANCER: ICD-10-CM

## 2023-06-20 DIAGNOSIS — M25.422 SWELLING OF JOINT OF UPPER ARM, LEFT: ICD-10-CM

## 2023-06-20 DIAGNOSIS — R70.0 ELEVATED SEDIMENTATION RATE: ICD-10-CM

## 2023-06-20 LAB
ALBUMIN SERPL-MCNC: 3.2 G/DL (ref 3.4–5)
ALBUMIN/GLOB SERPL: 0.7 {RATIO} (ref 1–2)
ALP LIVER SERPL-CCNC: 82 U/L
ALT SERPL-CCNC: 18 U/L
ANION GAP SERPL CALC-SCNC: 9 MMOL/L (ref 0–18)
AST SERPL-CCNC: 14 U/L (ref 15–37)
BASOPHILS # BLD AUTO: 0.03 X10(3) UL (ref 0–0.2)
BASOPHILS NFR BLD AUTO: 0.2 %
BILIRUB SERPL-MCNC: 1.1 MG/DL (ref 0.1–2)
BILIRUB UR QL: NEGATIVE
BUN BLD-MCNC: 21 MG/DL (ref 7–18)
BUN/CREAT SERPL: 19.3 (ref 10–20)
CALCIUM BLD-MCNC: 9.3 MG/DL (ref 8.5–10.1)
CHLORIDE SERPL-SCNC: 101 MMOL/L (ref 98–112)
CHOLEST SERPL-MCNC: 154 MG/DL (ref ?–200)
CLARITY UR: CLEAR
CO2 SERPL-SCNC: 28 MMOL/L (ref 21–32)
COLOR UR: YELLOW
COMPLEXED PSA SERPL-MCNC: 1.5 NG/ML (ref ?–4)
CREAT BLD-MCNC: 1.09 MG/DL
DEPRECATED RDW RBC AUTO: 37.4 FL (ref 35.1–46.3)
EOSINOPHIL # BLD AUTO: 0.16 X10(3) UL (ref 0–0.7)
EOSINOPHIL NFR BLD AUTO: 1.3 %
ERYTHROCYTE [DISTWIDTH] IN BLOOD BY AUTOMATED COUNT: 12.4 % (ref 11–15)
ERYTHROCYTE [SEDIMENTATION RATE] IN BLOOD: 73 MM/HR
EST. AVERAGE GLUCOSE BLD GHB EST-MCNC: 123 MG/DL (ref 68–126)
FASTING PATIENT LIPID ANSWER: NO
FASTING STATUS PATIENT QL REPORTED: NO
GFR SERPLBLD BASED ON 1.73 SQ M-ARVRAT: 71 ML/MIN/1.73M2 (ref 60–?)
GLOBULIN PLAS-MCNC: 4.3 G/DL (ref 2.8–4.4)
GLUCOSE BLD-MCNC: 142 MG/DL (ref 70–99)
GLUCOSE UR-MCNC: NORMAL MG/DL
HBA1C MFR BLD: 5.9 % (ref ?–5.7)
HCT VFR BLD AUTO: 41.1 %
HDLC SERPL-MCNC: 48 MG/DL (ref 40–59)
HGB BLD-MCNC: 13.8 G/DL
IMM GRANULOCYTES # BLD AUTO: 0.06 X10(3) UL (ref 0–1)
IMM GRANULOCYTES NFR BLD: 0.5 %
KETONES UR-MCNC: NEGATIVE MG/DL
LDLC SERPL CALC-MCNC: 86 MG/DL (ref ?–100)
LEUKOCYTE ESTERASE UR QL STRIP.AUTO: NEGATIVE
LYMPHOCYTES # BLD AUTO: 1.85 X10(3) UL (ref 1–4)
LYMPHOCYTES NFR BLD AUTO: 14.9 %
MCH RBC QN AUTO: 28 PG (ref 26–34)
MCHC RBC AUTO-ENTMCNC: 33.6 G/DL (ref 31–37)
MCV RBC AUTO: 83.5 FL
MONOCYTES # BLD AUTO: 1.13 X10(3) UL (ref 0.1–1)
MONOCYTES NFR BLD AUTO: 9.1 %
NEUTROPHILS # BLD AUTO: 9.15 X10 (3) UL (ref 1.5–7.7)
NEUTROPHILS # BLD AUTO: 9.15 X10(3) UL (ref 1.5–7.7)
NEUTROPHILS NFR BLD AUTO: 74 %
NITRITE UR QL STRIP.AUTO: NEGATIVE
NONHDLC SERPL-MCNC: 106 MG/DL (ref ?–130)
OSMOLALITY SERPL CALC.SUM OF ELEC: 291 MOSM/KG (ref 275–295)
PH UR: 6 [PH] (ref 5–8)
PLATELET # BLD AUTO: 282 10(3)UL (ref 150–450)
POTASSIUM SERPL-SCNC: 3.2 MMOL/L (ref 3.5–5.1)
PROT SERPL-MCNC: 7.5 G/DL (ref 6.4–8.2)
PROT UR-MCNC: NEGATIVE MG/DL
RBC # BLD AUTO: 4.92 X10(6)UL
RHEUMATOID FACT SERPL-ACNC: 15 IU/ML (ref ?–15)
SODIUM SERPL-SCNC: 138 MMOL/L (ref 136–145)
SP GR UR STRIP: 1.02 (ref 1–1.03)
TRIGL SERPL-MCNC: 108 MG/DL (ref 30–149)
TSI SER-ACNC: 0.88 MIU/ML (ref 0.36–3.74)
URATE SERPL-MCNC: 5.9 MG/DL
UROBILINOGEN UR STRIP-ACNC: NORMAL
VLDLC SERPL CALC-MCNC: 17 MG/DL (ref 0–30)
WBC # BLD AUTO: 12.4 X10(3) UL (ref 4–11)

## 2023-06-20 PROCEDURE — 84550 ASSAY OF BLOOD/URIC ACID: CPT

## 2023-06-20 PROCEDURE — 1125F AMNT PAIN NOTED PAIN PRSNT: CPT | Performed by: FAMILY MEDICINE

## 2023-06-20 PROCEDURE — 36415 COLL VENOUS BLD VENIPUNCTURE: CPT

## 2023-06-20 PROCEDURE — 85025 COMPLETE CBC W/AUTO DIFF WBC: CPT

## 2023-06-20 PROCEDURE — 80061 LIPID PANEL: CPT

## 2023-06-20 PROCEDURE — 84443 ASSAY THYROID STIM HORMONE: CPT

## 2023-06-20 PROCEDURE — 81001 URINALYSIS AUTO W/SCOPE: CPT

## 2023-06-20 PROCEDURE — 80053 COMPREHEN METABOLIC PANEL: CPT

## 2023-06-20 PROCEDURE — 86431 RHEUMATOID FACTOR QUANT: CPT

## 2023-06-20 PROCEDURE — 83036 HEMOGLOBIN GLYCOSYLATED A1C: CPT

## 2023-06-20 PROCEDURE — 85652 RBC SED RATE AUTOMATED: CPT

## 2023-06-20 PROCEDURE — 99214 OFFICE O/P EST MOD 30 MIN: CPT | Performed by: FAMILY MEDICINE

## 2023-06-20 RX ORDER — HYDROCODONE BITARTRATE AND ACETAMINOPHEN 5; 325 MG/1; MG/1
1 TABLET ORAL EVERY 6 HOURS PRN
COMMUNITY
End: 2023-06-20

## 2023-06-20 RX ORDER — PREDNISONE 20 MG/1
40 TABLET ORAL 2 TIMES DAILY
Qty: 20 TABLET | Refills: 0 | Status: SHIPPED | OUTPATIENT
Start: 2023-06-20 | End: 2023-06-25

## 2023-06-20 RX ORDER — HYDROCODONE BITARTRATE AND ACETAMINOPHEN 5; 325 MG/1; MG/1
1 TABLET ORAL EVERY 6 HOURS PRN
Qty: 30 TABLET | Refills: 0 | Status: SHIPPED | OUTPATIENT
Start: 2023-06-20

## 2023-06-30 RX ORDER — PREDNISONE 20 MG/1
TABLET ORAL
Qty: 20 TABLET | Refills: 0 | OUTPATIENT
Start: 2023-06-30

## 2023-07-03 ENCOUNTER — OFFICE VISIT (OUTPATIENT)
Dept: FAMILY MEDICINE CLINIC | Facility: CLINIC | Age: 73
End: 2023-07-03

## 2023-07-03 VITALS
DIASTOLIC BLOOD PRESSURE: 62 MMHG | SYSTOLIC BLOOD PRESSURE: 148 MMHG | WEIGHT: 217.63 LBS | HEART RATE: 52 BPM | TEMPERATURE: 98 F | BODY MASS INDEX: 35 KG/M2

## 2023-07-03 DIAGNOSIS — I25.10 CORONARY ARTERY DISEASE INVOLVING NATIVE CORONARY ARTERY OF NATIVE HEART WITHOUT ANGINA PECTORIS: ICD-10-CM

## 2023-07-03 DIAGNOSIS — M54.16 LUMBAR RADICULOPATHY: ICD-10-CM

## 2023-07-03 DIAGNOSIS — I10 ESSENTIAL HYPERTENSION: ICD-10-CM

## 2023-07-03 DIAGNOSIS — Z28.21 PNEUMOCOCCAL VACCINATION DECLINED BY PATIENT: ICD-10-CM

## 2023-07-03 DIAGNOSIS — R20.0 LEFT SIDED NUMBNESS: ICD-10-CM

## 2023-07-03 DIAGNOSIS — M48.02 FORAMINAL STENOSIS OF CERVICAL REGION: Primary | ICD-10-CM

## 2023-07-03 PROCEDURE — 1126F AMNT PAIN NOTED NONE PRSNT: CPT | Performed by: FAMILY MEDICINE

## 2023-07-03 PROCEDURE — 99214 OFFICE O/P EST MOD 30 MIN: CPT | Performed by: FAMILY MEDICINE

## 2023-07-10 ENCOUNTER — PATIENT MESSAGE (OUTPATIENT)
Dept: FAMILY MEDICINE CLINIC | Facility: CLINIC | Age: 73
End: 2023-07-10

## 2023-07-10 DIAGNOSIS — M48.02 FORAMINAL STENOSIS OF CERVICAL REGION: Primary | ICD-10-CM

## 2023-07-14 NOTE — TELEPHONE ENCOUNTER
Dr. Rupa Barry, patient asking if he can have a course of prednisone to have on hand when he had another episode.

## 2023-07-15 RX ORDER — METHYLPREDNISOLONE 4 MG/1
TABLET ORAL
Qty: 1 EACH | Refills: 0 | Status: SHIPPED | OUTPATIENT
Start: 2023-07-15

## 2023-07-15 NOTE — TELEPHONE ENCOUNTER
Message sent to patient with update. PCP response:    Jay Contreras DO   to Em Rn Triage       7/15/23 11:28 AM  Prescription request sent to the pharmacy. Please let the patient know. Provider Information    Authorizing Provider Encounter Provider   DO Jay Schaefer DO     Medication Detail    Medication Quantity Refills Start End   methylPREDNISolone (MEDROL) 4 MG Oral Tablet Therapy Pack 1 each 0 7/15/2023    Sig:   As directed.      Route:   (none)     Order #:   [id number]

## 2023-08-02 DIAGNOSIS — K21.9 GASTROESOPHAGEAL REFLUX DISEASE: ICD-10-CM

## 2023-08-02 DIAGNOSIS — I10 ESSENTIAL HYPERTENSION: ICD-10-CM

## 2023-08-04 RX ORDER — VALSARTAN AND HYDROCHLOROTHIAZIDE 320; 25 MG/1; MG/1
1 TABLET, FILM COATED ORAL DAILY
Qty: 90 TABLET | Refills: 0 | Status: SHIPPED | OUTPATIENT
Start: 2023-08-04

## 2023-08-04 RX ORDER — OMEPRAZOLE 40 MG/1
40 CAPSULE, DELAYED RELEASE ORAL DAILY
Qty: 90 CAPSULE | Refills: 3 | Status: SHIPPED | OUTPATIENT
Start: 2023-08-04

## 2023-08-04 RX ORDER — ATORVASTATIN CALCIUM 40 MG/1
40 TABLET, FILM COATED ORAL NIGHTLY
Qty: 90 TABLET | Refills: 3 | Status: SHIPPED | OUTPATIENT
Start: 2023-08-04

## 2023-08-04 NOTE — TELEPHONE ENCOUNTER
Please review; protocol failed. Routing as Dr Suzie Shaw is out of office. Medication pended for your review/approval.      Requested Prescriptions   Pending Prescriptions Disp Refills    Valsartan-hydroCHLOROthiazide 320-25 MG Oral Tab [Pharmacy Med Name: Valsartan-hydroCHLOROthiazide 320-25 MG Oral Tablet] 90 tablet 3     Sig: Take 1 tablet by mouth daily.        Hypertensive Medications Protocol Failed - 2023 10:01 PM        Failed - Last BP reading less than 140/90     BP Readings from Last 1 Encounters:  23 : 148/62              Passed - In person appointment in the past 12 or next 3 months     Recent Outpatient Visits              1 month ago Foraminal stenosis of cervical region    [address], [facility], [place], [place]    Office Visit    1 month ago Swelling of joint of upper arm, left    [facility], [facility], [place], [place]    Office Visit    1 month ago Colon cancer screening    Franky Cazares, [place], [place]    Office Visit    10 months ago Transient neurological symptoms    [facility], [address], Rut Bills MD    Office Visit    11 months ago Left sided numbness    Franky Cazares, Rut Bills MD    Office Visit          Future Appointments         Provider Department Appt Notes    In 1 month Flores Burton MD [address], [address], [place]     In 1 month Suzie Shaw, Yoko Dorsey DO [address], [facility], [facility] annual [place] wellness exam               Passed - CMP or BMP in past 6 months     Recent Results (from the past 4392 hour(s))   COMP METABOLIC PANEL (14)    Collection Time: 23 10:58 AM   Result Value Ref Range    Glucose 142 (H) 70 - 99 mg/dL    Sodium 138 136 - 145 mmol/L    Potassium 3.2 (L) 3.5 - 5.1 mmol/L    Chloride 101 98 - 112 mmol/L    CO2 28.0 21.0 - 32.0 mmol/L    Anion Gap 9 0 - 18 mmol/L    BUN 21 (H) 7 - 18 mg/dL    Creatinine 1.09 0.70 - 1.30 mg/dL    BUN/CREA Ratio 19.3 10.0 - 20.0    Calcium, Total 9.3 8.5 - 10.1 mg/dL    Calculated Osmolality 291 275 - 295 mOsm/kg    eGFR-Cr 71 >=60 mL/min/1.73m2    ALT 18 16 - 61 U/L    AST 14 (L) 15 - 37 U/L    Alkaline Phosphatase 82 45 - 117 U/L    Bilirubin, Total 1.1 0.1 - 2.0 mg/dL    Total Protein 7.5 6.4 - 8.2 g/dL    Albumin 3.2 (L) 3.4 - 5.0 g/dL    Globulin  4.3 2.8 - 4.4 g/dL    A/G Ratio 0.7 (L) 1.0 - 2.0    Patient Fasting for CMP? No      *Note: Due to a large number of results and/or encounters for the requested time period, some results have not been displayed. A complete set of results can be found in Results Review.                Passed - In person appointment or virtual visit in the past 6 months     Recent Outpatient Visits              1 month ago Foraminal stenosis of cervical region    [address], [facility], [place], [place]    Office Visit    1 month ago Swelling of joint of upper arm, left    [facility], [facility], [place], [place]    Office Visit    1 month ago Colon cancer screening    [address] [facility],     Office Visit    10 months ago Transient neurological symptoms    [facility], [address], Carmelo Goldstein MD    Office Visit    11 months ago Left sided numbness    Carmelo Arango MD    Office Visit          Future Appointments         Provider Department Appt Notes    In 1 month Dionicio Frazier MD [address], [address], [place]     In 1 month William Waite, DO [address], [place] [address], [facility] annual [place] wellness exam               Passed - EGFRCR or GFRNAA > 50     GFR Evaluation  EGFRCR: 71 , resulted on 6/20/2023           Signed Prescriptions Disp Refills    Omeprazole 40 MG Oral Capsule Delayed Release 90 capsule 3     Sig: Take 1 capsule (40 mg total) by mouth daily. Gastrointestional Medication Protocol Passed - 8/2/2023 10:01 PM        Passed - In person appointment or virtual visit in the past 12 mos or appointment in next 3 mos     Recent Outpatient Visits              1 month ago Foraminal stenosis of cervical region    [address], [address], [place], [place]    Office Visit    1 month ago Swelling of joint of upper arm, left    [facility], [address], [place], [place]    Office Visit    1 month ago Colon cancer screening    [facility], [place], [place],     Office Visit    10 months ago Transient neurological symptoms    [facility], [address], Jenelle Gill MD    Office Visit    11 months ago Left sided numbness    [address] [facility], Jenelle Gill MD    Office Visit          Future Appointments         Provider Department Appt Notes    In 1 month Lali Saavedra MD [address], [address], [place]     In 1 month Arlene Fernandez DO [address], [address], [place] annual [place] wellness exam                 atorvastatin 40 MG Oral Tab 90 tablet 3     Sig: Take 1 tablet (40 mg total) by mouth nightly.        Cholesterol Medication Protocol Passed - 8/2/2023 10:01 PM        Passed - ALT in past 12 months        Passed - LDL in past 12 months        Passed - Last ALT < 80     Lab Results   Component Value Date    ALT 18 06/20/2023             Passed - Last LDL < 130     Lab Results   Component Value Date    LDL 86 06/20/2023             Passed - In person appointment or virtual visit in the past 12 mos or appointment in next 3 mos     Recent Outpatient Visits              1 month ago Foraminal stenosis of cervical region    [address], [place], [facility] [place]    Office Visit    1 month ago Swelling of joint of upper arm, left    [facility], [address], [place], [place]    Office Visit    1 month ago Colon cancer screening    [address], [place], [facility], [place]    Office Visit    10 months ago Transient neurological symptoms    [facility], [address], Mikie Betancur MD    Office Visit    11 months ago Left sided numbness    [address], Van Bejarano MD    Office Visit          Future Appointments         Provider Department Appt Notes    In 1 month Brittni Shipley MD [address], [address], [place]     In 1 month [facility], Herchel Finch, DO [address], [address], [facility]                  Recent Outpatient Visits              1 month ago Foraminal stenosis of cervical region    [address], [address], [place], Herchel Finch, DO    Office Visit    1 month ago Swelling of joint of upper arm, left    [address], [address], [place], [place]    Office Visit    1 month ago Colon cancer screening    [address], [place], Herchel Finch, DO    Office Visit    10 months ago Transient neurological symptoms    [address], [address], Van Bejarano MD    Office Visit    11 months ago Left sided numbness    [address], [address], Van Bejarano MD    Office Visit          Future Appointments         Provider Department Appt Notes    In 1 month Brittni Shipley MD [address], [address], [place]     In 1 month Stoney Gayle, Zhane Puente, DO [address] [facility] [address], [place] marquis annual [place] wellness exam

## 2023-08-04 NOTE — TELEPHONE ENCOUNTER
Refill passed per [place] [employer], [facility] protocol.   Requested Prescriptions   Pending Prescriptions Disp Refills    OMEPRAZOLE 40 MG Oral Capsule Delayed Release [Pharmacy Med Name: Omeprazole 40 MG Oral Capsule Delayed Release] 90 capsule 3     Sig: TAKE 1 CAPSULE BY MOUTH  DAILY       Gastrointestional Medication Protocol Passed - 8/2/2023 10:01 PM        Passed - In person appointment or virtual visit in the past 12 mos or appointment in next 3 mos     Recent Outpatient Visits              1 month ago Foraminal stenosis of cervical region    [address], [address], [place], [place], [place]    Office Visit    1 month ago Swelling of joint of upper arm, left    [facility], [address], [place], [place]    Office Visit    1 month ago Colon cancer screening    Lindsay Ramires, [place], Aniyaabdoulaye Guerrero DO    Office Visit    10 months ago Transient neurological symptoms    [facility], [address], Jenelle Gill MD    Office Visit    11 months ago Left sided numbness    Jenelle Nelson MD    Office Visit          Future Appointments         Provider Department Appt Notes    In 1 month Lali Saavedra MD [address], [address], [place]     In 1 month Hollis CenterAniya DO [address], [address], [facility] annual [place] wellness exam                 ATORVASTATIN 40 MG Oral Tab [place] Med Name: Atorvastatin Calcium 40 MG Oral Tablet] 90 tablet 3     Sig: TAKE 1 TABLET BY MOUTH AT  NIGHT       Cholesterol Medication Protocol Passed - 8/2/2023 10:01 PM        Passed - ALT in past 12 months        Passed - LDL in past 12 months        Passed - Last ALT < 80     Lab Results   Component Value Date    ALT 18 06/20/2023             Passed - Last LDL < 130     Lab Results   Component Value Date    LDL 86 06/20/2023             Passed - In person appointment or virtual visit in the past 12 mos or appointment in next 3 mos     Recent Outpatient Visits              1 month ago Foraminal stenosis of cervical region    [address] [place], [place]    Office Visit    1 month ago Swelling of joint of upper arm, left    [facility], [address], [place], [place]    Office Visit    1 month ago Colon cancer screening    [address] [facility], Elie Mark DO    Office Visit    10 months ago Transient neurological symptoms    [facility], [address], Ace Aden MD    Office Visit    11 months ago Left sided numbness    [address] [facility], Ace Aden MD    Office Visit          Future Appointments         Provider Department Appt Notes    In 1 month Jose Alfredo Woods MD [address], [address], [facility]     In 1 month Radha Roldan DO [address], [address], [facility] annual [facility] wellness exam                 VALSARTAN-HYDROCHLOROTHIAZIDE 320-25 MG Oral Tab [place] Med Name: Valsartan-hydroCHLOROthiazide 320-25 MG Oral Tablet] 90 tablet 3     Sig: TAKE 1 TABLET BY MOUTH  DAILY       Hypertensive Medications Protocol Failed - 8/2/2023 10:01 PM        Failed - Last BP reading less than 140/90     BP Readings from Last 1 Encounters:  07/03/23 : 148/62              Passed - In person appointment in the past 12 or next 3 months     Recent Outpatient Visits              1 month ago Foraminal stenosis of cervical region    [address], [address], [place]    Office Visit    1 month ago Swelling of joint of upper arm, left    [address], [address], [place] Alejandre [facility] [place] [facility]    Office Visit    1 month ago Colon cancer screening    [address] [facility], [place], [place]    Office Visit    10 months ago Transient neurological symptoms    [facility], [address], Nestor Russell MD    Office Visit    11 months ago Left sided numbness    [address], [address], Kody Villa MD    Office Visit          Future Appointments         Provider Department Appt Notes    In 1 month Nadia Benson MD [address], [address], [place]     In 1 month Catalina Vigil, DO [address], [place] [address], [place] annual [facility] wellness exam               Passed - CMP or BMP in past 6 months     Recent Results (from the past 4392 hour(s))   COMP METABOLIC PANEL (14)    Collection Time: 06/20/23 10:58 AM   Result Value Ref Range    Glucose 142 (H) 70 - 99 mg/dL    Sodium 138 136 - 145 mmol/L    Potassium 3.2 (L) 3.5 - 5.1 mmol/L    Chloride 101 98 - 112 mmol/L    CO2 28.0 21.0 - 32.0 mmol/L    Anion Gap 9 0 - 18 mmol/L    BUN 21 (H) 7 - 18 mg/dL    Creatinine 1.09 0.70 - 1.30 mg/dL    BUN/CREA Ratio 19.3 10.0 - 20.0    Calcium, Total 9.3 8.5 - 10.1 mg/dL    Calculated Osmolality 291 275 - 295 mOsm/kg    eGFR-Cr 71 >=60 mL/min/1.73m2    ALT 18 16 - 61 U/L    AST 14 (L) 15 - 37 U/L    Alkaline Phosphatase 82 45 - 117 U/L    Bilirubin, Total 1.1 0.1 - 2.0 mg/dL    Total Protein 7.5 6.4 - 8.2 g/dL    Albumin 3.2 (L) 3.4 - 5.0 g/dL    Globulin  4.3 2.8 - 4.4 g/dL    A/G Ratio 0.7 (L) 1.0 - 2.0    Patient Fasting for CMP? No      *Note: Due to a large number of results and/or encounters for the requested time period, some results have not been displayed. A complete set of results can be found in Results Review.                Passed - In person appointment or virtual visit in the past 6 months     Recent Outpatient Visits              1 month ago Foraminal stenosis of cervical region [address] [place] [address], [place], [place]    Office Visit    1 month ago Swelling of joint of upper arm, left    [facility], [address], [place], [place]    Office Visit    1 month ago Colon cancer screening    [address] [place] [address], [place], [place]    Office Visit    10 months ago Transient neurological symptoms    [facility], [address], Mikie Tatum MD    Office Visit    11 months ago Left sided numbness    [employer], [address], Yanna Bettencourt MD    Office Visit          Future Appointments         Provider Department Appt Notes    In 1 month Jahaira Wilson MD [employer], [address], [facility]     In 1 month Jennifer Carty DO [employer], [address], [facility] annual [facility] wellness exam               Passed - [facility] or GFRNAA > 50     GFR Evaluation  EGFRCR: 71 , resulted on 6/20/2023             Recent Outpatient Visits              1 month ago Foraminal stenosis of cervical region    [employer], [address], [place], [place]    Office Visit    1 month ago Swelling of joint of upper arm, left    [facility], [address], [place], [place]    Office Visit    1 month ago Colon cancer screening    [address] [place] [address], Penny Ward DO    Office Visit    10 months ago Transient neurological symptoms    [employer], [address], Linda Smith MD    Office Visit    11 months ago Left sided numbness    [employer] [facility], [address], Yanna Bettencourt MD    Office Visit          Future Appointments         Provider Department Appt Notes    In 1 month Jahaira Wilson MD [employer], [address], [facility] In 1 month DO Yuval Marte, [facility] annual [place] wellness exam

## 2023-08-24 DIAGNOSIS — M48.02 FORAMINAL STENOSIS OF CERVICAL REGION: ICD-10-CM

## 2023-08-25 RX ORDER — METHYLPREDNISOLONE 4 MG/1
TABLET ORAL
Qty: 1 EACH | Refills: 0 | OUTPATIENT
Start: 2023-08-25

## 2023-08-28 ENCOUNTER — TELEMEDICINE (OUTPATIENT)
Dept: INTERNAL MEDICINE CLINIC | Facility: CLINIC | Age: 73
End: 2023-08-28

## 2023-08-28 DIAGNOSIS — U07.1 POSITIVE SELF-ADMINISTERED ANTIGEN TEST FOR COVID-19: Primary | ICD-10-CM

## 2023-08-28 NOTE — PATIENT INSTRUCTIONS
STOP ATORVASTATIN NOW and hold it until 2 DAYS AFTER FINISHING PAXLOVID    CHECK BLOOD PRESSURE DAILY and if low <074 systolic or with symptoms of low blood pressure - CUT AMLODIPINE DOSE IN HALF for duration of paxlovid treatment

## 2023-08-28 NOTE — PROGRESS NOTES
This is a telemedicine visit with live, interactive video and audio. Patient understands and accepts financial responsibility for any deductible, co-insurance and/or co-pays associated with this service. SUBJECTIVE  PMHx CAD s/p CABG, HTN, HLD, cardiomyopathy, obesity, TIA    COVID positive via home test on 8/26  Has been sick for about 3 days   Symptoms include - \"like a bad cold\"- runny nose, coughing, sneezing   Has never had COVID before     Vaccinated x3   Blood pressure usually runs around 140/90 and recheck around the 130s     HISTORY:  Past Medical History:   Diagnosis Date    Allergic rhinitis     Allergy     allergies    Anxiety state     Cataract     Colon polyps     colonic polyps.   colonoscopy with polypectomy    Coronary atherosclerosis     Esophageal reflux     Heart attack ([facility] [address]     High blood pressure     High cholesterol     Male hypogonadism     Obesity, unspecified     Other and unspecified hyperlipidemia     Pneumonia due to organism     15-20 yrs ago    Sleep apnea     Unspecified essential hypertension     Visual impairment     glasses      Past Surgical History:   Procedure Laterality Date    CABG      CARPAL TUNNEL RELEASE Bilateral 2010    surgery, carpal tunnel syndrome, successful    COLONOSCOPY      COLONOSCOPY      HERNIA SURGERY      umbilical    UPPER GI ENDOSCOPY,EXAM        Family History   Problem Relation Age of Onset    Hypertension Father     Lipids Father     Lipids Mother     Hypertension Mother     Diabetes Mother     Heart Disease Mother         arterial disease    Hypertension Maternal Grandmother     Stroke Maternal Grandmother         CVA (stroke)      Social History     Socioeconomic History    Marital status: Single   Tobacco Use    Smoking status: Never    Smokeless tobacco: Never   Vaping Use    Vaping Use: Never used   Substance and Sexual Activity    Alcohol use: Not Currently     Comment: Rarely    Drug use: Yes     Types: Cannabis     Comment: PM time Other Topics Concern    Caffeine Concern Yes     Comment: coffee 1-2 cups daily    Exercise No   Social History Narrative    The patient does not use an assistive device. .      The patient does live in a home with stairs. Seasonal                   Current Outpatient Medications   Medication Sig Dispense Refill    nirmatrelvir-ritonavir 300-100 MG Oral Tablet Therapy Pack Take two nirmatrelvir tablets (300mg) with one ritonavir tablet (100mg) together twice daily for 5 days. 30 tablet 0    methylPREDNISolone (MEDROL) 4 MG Oral Tablet Therapy Pack As directed. 1 each 0    Omeprazole 40 MG Oral Capsule Delayed Release Take 1 capsule (40 mg total) by mouth daily. 90 capsule 3    atorvastatin 40 MG Oral Tab Take 1 tablet (40 mg total) by mouth nightly. 90 tablet 3    Valsartan-hydroCHLOROthiazide 320-25 MG Oral Tab Take 1 tablet by mouth daily. 90 tablet 0    metoprolol tartrate 100 MG Oral Tab Take 1 tablet (100 mg total) by mouth 2 (two) times daily. 180 tablet 3    HYDROcodone-acetaminophen 5-325 MG Oral Tab Take 1 tablet by mouth every 6 (six) hours as needed for Pain. 30 tablet 0    AMLODIPINE 5 MG Oral Tab TAKE 1 TABLET BY MOUTH  DAILY 90 tablet 3    Calcium Carbonate-Vitamin D 600-200 MG-UNIT Oral Tab Take by mouth. aspirin 81 MG Oral Tab EC Take 1 tablet (81 mg total) by mouth daily. 30 tablet 3    Multiple Vitamin (MULTI-VITAMINS) Oral Tab Take  by mouth. take 1 tablet by oral route  every day with food       OBJECTIVE  Physical Exam:   alert, appears stated age, cooperative, and mild distress, Speaking in full sentences comfortably, and Normal work of breathing  Pt appears slightly short of breath - elevated respiratory rate  Slightly congested voice    ASSESSMENT & PLAN  Diagnoses and all orders for this visit:    Positive self-administered antigen test for COVID-19  -     nirmatrelvir-ritonavir 300-100 MG Oral Tablet Therapy Pack;  Take two nirmatrelvir tablets (300mg) with one ritonavir tablet (100mg) together twice daily for 5 days. Discussed patient is high risk for developing severe COVID-19 due to age, cardiac history and obesity. Eligible for Paxlovid as long as able to make the following medication changes.  Patient states he is able to manage his medications and can make the changes below:    STOP ATORVASTATIN NOW and hold it until 2 [address] and if low <048 systolic or with symptoms of low blood pressure - CUT AMLODIPINE DOSE IN HALF for duration of paxlovid treatment      5m 07s  Total time: 20min    MODE [place]

## 2023-09-01 DIAGNOSIS — M48.02 FORAMINAL STENOSIS OF CERVICAL REGION: ICD-10-CM

## 2023-09-01 RX ORDER — METHYLPREDNISOLONE 4 MG/1
TABLET ORAL
Qty: 1 EACH | Refills: 0 | Status: SHIPPED | OUTPATIENT
Start: 2023-09-01

## 2023-09-13 ENCOUNTER — LAB ENCOUNTER (OUTPATIENT)
Dept: LAB | Facility: HOSPITAL | Age: 73
End: 2023-09-13
Attending: INTERNAL MEDICINE
Payer: MEDICARE

## 2023-09-13 ENCOUNTER — OFFICE VISIT (OUTPATIENT)
Dept: RHEUMATOLOGY | Facility: CLINIC | Age: 73
End: 2023-09-13

## 2023-09-13 VITALS
SYSTOLIC BLOOD PRESSURE: 164 MMHG | DIASTOLIC BLOOD PRESSURE: 80 MMHG | HEART RATE: 67 BPM | HEIGHT: 66 IN | WEIGHT: 214.69 LBS | BODY MASS INDEX: 34.5 KG/M2 | RESPIRATION RATE: 16 BRPM

## 2023-09-13 DIAGNOSIS — R70.0 ELEVATED SED RATE: ICD-10-CM

## 2023-09-13 DIAGNOSIS — M13.0 POLYARTHRITIS: Primary | ICD-10-CM

## 2023-09-13 DIAGNOSIS — M54.12 CERVICAL RADICULOPATHY: ICD-10-CM

## 2023-09-13 DIAGNOSIS — R76.8 ELEVATED RHEUMATOID FACTOR: ICD-10-CM

## 2023-09-13 DIAGNOSIS — M54.2 NECK PAIN: ICD-10-CM

## 2023-09-13 DIAGNOSIS — M13.0 POLYARTHRITIS: ICD-10-CM

## 2023-09-13 LAB
CRP SERPL-MCNC: 0.49 MG/DL (ref ?–0.3)
ERYTHROCYTE [SEDIMENTATION RATE] IN BLOOD: 18 MM/HR
RHEUMATOID FACT SERPL-ACNC: 14 IU/ML (ref ?–15)
URATE SERPL-MCNC: 7.1 MG/DL

## 2023-09-13 PROCEDURE — 86200 CCP ANTIBODY: CPT

## 2023-09-13 PROCEDURE — 99204 OFFICE O/P NEW MOD 45 MIN: CPT | Performed by: INTERNAL MEDICINE

## 2023-09-13 PROCEDURE — 36415 COLL VENOUS BLD VENIPUNCTURE: CPT

## 2023-09-13 PROCEDURE — 86431 RHEUMATOID FACTOR QUANT: CPT

## 2023-09-13 PROCEDURE — 1125F AMNT PAIN NOTED PAIN PRSNT: CPT | Performed by: INTERNAL MEDICINE

## 2023-09-13 PROCEDURE — 84550 ASSAY OF BLOOD/URIC ACID: CPT

## 2023-09-13 PROCEDURE — 85652 RBC SED RATE AUTOMATED: CPT

## 2023-09-13 PROCEDURE — 86140 C-REACTIVE PROTEIN: CPT

## 2023-09-13 RX ORDER — METHYLPREDNISOLONE 4 MG/1
TABLET ORAL
Qty: 1 EACH | Refills: 0 | Status: SHIPPED | OUTPATIENT
Start: 2023-09-13

## 2023-09-14 LAB — CCP IGG SERPL-ACNC: <0.4 U/ML (ref 0–6.9)

## 2023-10-24 DIAGNOSIS — I10 ESSENTIAL HYPERTENSION: ICD-10-CM

## 2023-10-26 ENCOUNTER — HOSPITAL ENCOUNTER (OUTPATIENT)
Dept: MRI IMAGING | Facility: HOSPITAL | Age: 73
Discharge: HOME | End: 2023-10-26
Attending: INTERNAL MEDICINE

## 2023-10-26 DIAGNOSIS — M54.2 NECK PAIN: ICD-10-CM

## 2023-10-26 DIAGNOSIS — R70.0 ELEVATED SED RATE: ICD-10-CM

## 2023-10-26 DIAGNOSIS — M54.12 CERVICAL RADICULOPATHY: ICD-10-CM

## 2023-10-26 PROCEDURE — 72141 MRI NECK SPINE W/O DYE: CPT | Performed by: INTERNAL MEDICINE

## 2023-10-26 RX ORDER — VALSARTAN AND HYDROCHLOROTHIAZIDE 320; 25 MG/1; MG/1
1 TABLET, FILM COATED ORAL DAILY
Qty: 90 TABLET | Refills: 3 | Status: SHIPPED | OUTPATIENT
Start: 2023-10-26

## 2023-10-26 NOTE — TELEPHONE ENCOUNTER
Please review; protocol failed. Requested Prescriptions   Pending Prescriptions Disp Refills    VALSARTAN-HYDROCHLOROTHIAZIDE 320-25 MG Oral Tab [Pharmacy Med Name: Valsartan-hydroCHLOROthiazide 320-25 MG Oral Tablet] 90 tablet 3     Sig: Take 1 tablet by mouth daily.        Hypertensive Medications Protocol Failed - 10/24/2023  9:02 PM        Failed - Last BP reading less than 140/90     BP Readings from Last 1 Encounters:  09/13/23 : (!) 164/80              Passed - In person appointment in the past 12 or next 3 months     Recent Outpatient Visits              1 month ago [address] [facility], Julio Ross MD    Office Visit    1 month ago Positive self-administered antigen test for [employer], [address] [facility] Opal [place], MO[place]    Telemedicine    3 months ago Foraminal stenosis of cervical region    [facility], [facility], [place], [place]y [place]    Office Visit    4 months ago Swelling of joint of upper arm, left    [facility], [place], [place]    Office Visit    4 months ago Colon cancer screening    [address] [facility], Riky Dai,     Office Visit          Future Appointments         Provider Department Appt Notes    Today [facility] THE [facility] MRI RM1 (1.5T) [address] [facility] authorized  QA [address] [facility] [address]    In 6 days Marie Tellez MD [employer], [address] follow up ok per     In 2 months Duke Wyatt DO [employer], [facility], [place] annual [place] wellness exam- 12-2-21 last px                      Passed - CMP or BMP in past 6 months     Recent Results (from the past 4392 hour(s))   Comp Metabolic Panel (14) [E]    Collection Time: 06/20/23 10:58 AM   Result Value Ref Range    Glucose 142 (H) 70 - 99 mg/dL    Sodium 138 136 - 145 mmol/L    Potassium 3.2 (L) 3.5 - 5.1 mmol/L    Chloride 101 98 - 112 mmol/L    CO2 28.0 21.0 - 32.0 mmol/L    Anion Gap 9 0 - 18 mmol/L    BUN 21 (H) 7 - 18 mg/dL    Creatinine 1.09 0.70 - 1.30 mg/dL    BUN/CREA Ratio 19.3 10.0 - 20.0    Calcium, Total 9.3 8.5 - 10.1 mg/dL    Calculated Osmolality 291 275 - 295 mOsm/kg    eGFR-Cr 71 >=60 mL/min/1.73m2    ALT 18 16 - 61 U/L    AST 14 (L) 15 - 37 U/L    Alkaline Phosphatase 82 45 - 117 U/L    Bilirubin, Total 1.1 0.1 - 2.0 mg/dL    Total Protein 7.5 6.4 - 8.2 g/dL    Albumin 3.2 (L) 3.4 - 5.0 g/dL    Globulin  4.3 2.8 - 4.4 g/dL    A/G Ratio 0.7 (L) 1.0 - 2.0    Patient Fasting for CMP? No      *Note: Due to a large number of results and/or encounters for the requested time period, some results have not been displayed. A complete set of results can be found in Results Review.                Passed - In person appointment or virtual visit in the past 6 months     Recent Outpatient Visits              1 month ago [address] [facility], Kelsey Walton MD    Office Visit    1 month ago Positive self-administered antigen test for [employer], [address] [facility] Opal [place], [place]    Telemedicine    3 months ago Foraminal stenosis of cervical region    [address], [address]    Office Visit    4 months ago Swelling of joint of upper arm, left    [facility], [facility] [address], [place], [place]    Office Visit    4 months ago Colon cancer screening    [address] [facility],     Office Visit          Future Appointments         Provider Department Appt Notes    Today [address] MRI RM1 (1.5T) [address] [facility] authorized  QA [address]    In 6 days Rakesh Friedman MD [address], [address] [facility] follow up ok per     In 2 months Jay Contreras, DO [address] [facility], [place] annual [place] wellness exam- 12-2-21 last px                      Passed - [facility] or GFRNAA > 50     GFR Evaluation  EGFRCR: 71 , resulted on 6/20/2023             Recent Outpatient Visits              1 month ago [address] [facility], Selena Genao MD    Office Visit    1 month ago Positive self-administered antigen test for [employer], [address] [facility] Opal Casillas APRN    Telemedicine    3 months ago Foraminal stenosis of cervical region    [address], [address], [place], [place], [place]    Office Visit    4 months ago Swelling of joint of upper arm, left    [facility], [address], [place], [place]    Office Visit    4 months ago Colon cancer screening    [address] [facility], [place], [facility]     Office Visit          Future Appointments         Provider Department Appt Notes    Today [facility] MRI RM1 (1.5T) [address] [facility] authorized  QA [address]    In 6 days Emmett Parmar MD [address], [address] [facility] [address] follow up ok per     In 2 months Jay Contreras DO [address], [address], [place] annual [place] wellness exam- 12-2-21 last px

## 2023-12-22 ENCOUNTER — TELEMEDICINE (OUTPATIENT)
Dept: TELEHEALTH | Age: 73
End: 2023-12-22
Payer: COMMERCIAL

## 2023-12-22 DIAGNOSIS — R05.1 ACUTE COUGH: Primary | ICD-10-CM

## 2023-12-22 PROCEDURE — 99212 OFFICE O/P EST SF 10 MIN: CPT | Performed by: PHYSICIAN ASSISTANT

## 2023-12-22 NOTE — PROGRESS NOTES
Virtual/Telephone Check-In    Jazmin Gomes verbally consents to a Virtual/Telephone Check-In service on 12/22/23. Patient has been referred to the [facility] website at [email or web address] to review the yearly Consent to Treat document. Patient understands and accepts financial responsibility for any deductible, co-insurance and/or co-pays associated with this service. Telehealth Verbal Consent   I conducted a telehealth visit with Jazmin Gomes today, 12/22/23, which was completed using two-way, real-time interactive audio and video communication. This has been done in good chung to provide continuity of care in the best interest of the provider-patient relationship, due to the COVID -19 public health crisis/national emergency where restrictions of face-to-face office visits are ongoing. Every conscious effort was taken to allow for sufficient and adequate time to complete the visit. The patient was made aware of the limitations of the telehealth visit, including treatment limitations as no physical exam could be performed. The patient was advised to call 911 or to go to the ER in case there was an emergency. The patient was also advised of the potential privacy & security concerns related to the telehealth platform. The patient was made aware of where to find [facility] notice of privacy practices, telehealth consent form and other related consent forms and documents. which are located on the [facility] website. The patient verbally agreed to telehealth consent form, related consents and the risks discussed. Lastly, the patient confirmed that they were in [place]. Included in this visit, time may have been spent reviewing labs, medications, radiology tests and decision making. Appropriate medical decision-making and tests are ordered as detailed in the plan of care above. Coding/billing information is submitted for this visit based on complexity of care and/or time spent for the visit.     CHIEF COMPLAINT:  Chief Complaint   Patient presents with    Upper Respiratory Infection       HPI:  Elmer Patterson is a 75year old male who presents for a video visit. Patient reports he has had a cough for over a week. Believes he had RSV on 12/13. Still has cough. Patient denies sinus symptoms, fever, chills, SOB, chest pain, wheezing. Patient has tried Mucinex for symptoms, which has seemed to help. Current Outpatient Medications   Medication Sig Dispense Refill    Valsartan-hydroCHLOROthiazide 320-25 MG Oral Tab Take 1 tablet by mouth daily. 90 tablet 3    methylPREDNISolone 4 MG Oral Tablet Therapy Pack Take as directed on package. 1 each 0    methylPREDNISolone (MEDROL) 4 MG Oral Tablet Therapy Pack As directed. 1 each 0    Omeprazole 40 MG Oral Capsule Delayed Release Take 1 capsule (40 mg total) by mouth daily. 90 capsule 3    atorvastatin 40 MG Oral Tab Take 1 tablet (40 mg total) by mouth nightly. 90 tablet 3    metoprolol tartrate 100 MG Oral Tab Take 1 tablet (100 mg total) by mouth 2 (two) times daily. 180 tablet 3    HYDROcodone-acetaminophen 5-325 MG Oral Tab Take 1 tablet by mouth every 6 (six) hours as needed for Pain. 30 tablet 0    AMLODIPINE 5 MG Oral Tab TAKE 1 TABLET BY MOUTH  DAILY 90 tablet 3    Calcium Carbonate-Vitamin D 600-200 MG-UNIT Oral Tab Take by mouth. aspirin 81 MG Oral Tab EC Take 1 tablet (81 mg total) by mouth daily. 30 tablet 3    Multiple Vitamin (MULTI-VITAMINS) Oral Tab Take  by mouth. take 1 tablet by oral route  every day with food       Past Medical History:   Diagnosis Date    Allergic rhinitis     Allergy     allergies    Anxiety state     Cataract     Colon polyps     colonic polyps.   colonoscopy with polypectomy    Coronary atherosclerosis     Esophageal reflux     Heart attack ([facility] [address]     High blood pressure     High cholesterol     Male hypogonadism     Obesity, unspecified     Other and unspecified hyperlipidemia     Pneumonia due to organism     15-20 yrs ago    Sleep apnea     Unspecified essential hypertension     Visual impairment     glasses     Past Surgical History:   Procedure Laterality Date    CABG      CARPAL TUNNEL RELEASE Bilateral 2010    surgery, carpal tunnel syndrome, successful    COLONOSCOPY      COLONOSCOPY      HERNIA SURGERY      umbilical    UPPER GI ENDOSCOPY,EXAM         Social History     Socioeconomic History    Marital status: Single   Tobacco Use    Smoking status: Never    Smokeless tobacco: Never   Vaping Use    Vaping Use: Never used   Substance and Sexual Activity    Alcohol use: Not Currently     Comment: Rarely    Drug use: Yes     Types: Cannabis     Comment: PM time   Other Topics Concern    Caffeine Concern Yes     Comment: coffee 1-2 cups daily    Exercise No   Social History Narrative    The patient does not use an assistive device. .      The patient does live in a home with stairs. REVIEW OF SYSTEMS:  GENERAL: normal appetite  SKIN: no rashes or abnormal skin lesions  HEENT: See HPI  LUNGS: denies shortness of breath or wheezing, See HPI  CARDIOVASCULAR: denies chest pain or palpitations   GI: denies N/V/C or abdominal pain  NEURO: Denies headaches    EXAM:  General: Alert, Well-appearing, and In no acute distress  Respiratory:   Speaking in full sentences comfortably  Normal work of breathing  No cough during visit  Head: Normocephalic  Nose: No obvious nasal discharge. Skin: No obvious rashes or lesions from what observed. No results found for this or any previous visit (from the past 24 hour(s)). ASSESSMENT AND PLAN:  Dionisio Clark is a 75year old male who presents with symptoms that are consistent with    ASSESSMENT:   Encounter Diagnosis   Name Primary? Acute cough Yes       PLAN: Pt overall looks well. No cough during exam. No concerning symptoms. Low suspicion for bacterial etiology. Discussed course of viral illness and cough can linger for several weeks. Discussed otc meds to take.  Discussed when to seek in person care. See patient Instructions    There are no Patient Instructions on file for this visit. The patient indicates understanding of these issues and agrees to the plan. The patient is asked to f/u with PCP or at IC if sx's persist or worsen. Mirta Naidu understands video visit evaluation is not a substitute for face-to-face examination or emergency care. Patient advised to go to ER or call 911 for worsening symptoms or acute distress.

## 2024-01-16 ENCOUNTER — LAB ENCOUNTER (OUTPATIENT)
Dept: LAB | Age: 74
End: 2024-01-16
Attending: FAMILY MEDICINE
Payer: MEDICARE

## 2024-01-16 ENCOUNTER — OFFICE VISIT (OUTPATIENT)
Dept: FAMILY MEDICINE CLINIC | Facility: CLINIC | Age: 74
End: 2024-01-16

## 2024-01-16 VITALS
DIASTOLIC BLOOD PRESSURE: 78 MMHG | BODY MASS INDEX: 36.16 KG/M2 | HEIGHT: 66 IN | SYSTOLIC BLOOD PRESSURE: 137 MMHG | HEART RATE: 71 BPM | TEMPERATURE: 98 F | WEIGHT: 225 LBS

## 2024-01-16 DIAGNOSIS — Z12.11 COLON CANCER SCREENING: ICD-10-CM

## 2024-01-16 DIAGNOSIS — M10.9 GOUT, UNSPECIFIED CAUSE, UNSPECIFIED CHRONICITY, UNSPECIFIED SITE: ICD-10-CM

## 2024-01-16 DIAGNOSIS — Z29.11 NEED FOR RSV VACCINATION: ICD-10-CM

## 2024-01-16 DIAGNOSIS — I42.9 CARDIOMYOPATHY, UNSPECIFIED TYPE (HCC): ICD-10-CM

## 2024-01-16 DIAGNOSIS — Z23 INFLUENZA VACCINE NEEDED: ICD-10-CM

## 2024-01-16 DIAGNOSIS — I11.9 LVH (LEFT VENTRICULAR HYPERTROPHY) DUE TO HYPERTENSIVE DISEASE, WITHOUT HEART FAILURE: Primary | ICD-10-CM

## 2024-01-16 DIAGNOSIS — Z00.00 MEDICARE ANNUAL WELLNESS VISIT, SUBSEQUENT: ICD-10-CM

## 2024-01-16 DIAGNOSIS — I77.89 ASCENDING AORTA ENLARGEMENT (HCC): ICD-10-CM

## 2024-01-16 LAB
ALBUMIN SERPL-MCNC: 4.4 G/DL (ref 3.2–4.8)
ALBUMIN/GLOB SERPL: 1.7 {RATIO} (ref 1–2)
ALP LIVER SERPL-CCNC: 82 U/L
ALT SERPL-CCNC: 13 U/L
ANION GAP SERPL CALC-SCNC: 6 MMOL/L (ref 0–18)
AST SERPL-CCNC: 20 U/L (ref ?–34)
BASOPHILS # BLD AUTO: 0.06 X10(3) UL (ref 0–0.2)
BASOPHILS NFR BLD AUTO: 0.6 %
BILIRUB SERPL-MCNC: 1 MG/DL (ref 0.2–1.1)
BUN BLD-MCNC: 13 MG/DL (ref 9–23)
BUN/CREAT SERPL: 12.5 (ref 10–20)
CALCIUM BLD-MCNC: 9.8 MG/DL (ref 8.7–10.4)
CHLORIDE SERPL-SCNC: 104 MMOL/L (ref 98–112)
CHOLEST SERPL-MCNC: 158 MG/DL (ref ?–200)
CO2 SERPL-SCNC: 32 MMOL/L (ref 21–32)
CREAT BLD-MCNC: 1.04 MG/DL
DEPRECATED RDW RBC AUTO: 38.6 FL (ref 35.1–46.3)
EGFRCR SERPLBLD CKD-EPI 2021: 75 ML/MIN/1.73M2 (ref 60–?)
EOSINOPHIL # BLD AUTO: 0.33 X10(3) UL (ref 0–0.7)
EOSINOPHIL NFR BLD AUTO: 3.1 %
ERYTHROCYTE [DISTWIDTH] IN BLOOD BY AUTOMATED COUNT: 12.2 % (ref 11–15)
FASTING PATIENT LIPID ANSWER: NO
FASTING STATUS PATIENT QL REPORTED: NO
GLOBULIN PLAS-MCNC: 2.6 G/DL (ref 2.8–4.4)
GLUCOSE BLD-MCNC: 124 MG/DL (ref 70–99)
HCT VFR BLD AUTO: 45.1 %
HDLC SERPL-MCNC: 37 MG/DL (ref 40–59)
HGB BLD-MCNC: 14.7 G/DL
IMM GRANULOCYTES # BLD AUTO: 0.02 X10(3) UL (ref 0–1)
IMM GRANULOCYTES NFR BLD: 0.2 %
LDLC SERPL CALC-MCNC: 73 MG/DL (ref ?–100)
LYMPHOCYTES # BLD AUTO: 2.31 X10(3) UL (ref 1–4)
LYMPHOCYTES NFR BLD AUTO: 22 %
MCH RBC QN AUTO: 27.9 PG (ref 26–34)
MCHC RBC AUTO-ENTMCNC: 32.6 G/DL (ref 31–37)
MCV RBC AUTO: 85.7 FL
MONOCYTES # BLD AUTO: 0.77 X10(3) UL (ref 0.1–1)
MONOCYTES NFR BLD AUTO: 7.3 %
NEUTROPHILS # BLD AUTO: 7 X10 (3) UL (ref 1.5–7.7)
NEUTROPHILS # BLD AUTO: 7 X10(3) UL (ref 1.5–7.7)
NEUTROPHILS NFR BLD AUTO: 66.8 %
NONHDLC SERPL-MCNC: 121 MG/DL (ref ?–130)
OSMOLALITY SERPL CALC.SUM OF ELEC: 296 MOSM/KG (ref 275–295)
PLATELET # BLD AUTO: 159 10(3)UL (ref 150–450)
POTASSIUM SERPL-SCNC: 3.5 MMOL/L (ref 3.5–5.1)
PROT SERPL-MCNC: 7 G/DL (ref 5.7–8.2)
RBC # BLD AUTO: 5.26 X10(6)UL
SODIUM SERPL-SCNC: 142 MMOL/L (ref 136–145)
TRIGL SERPL-MCNC: 301 MG/DL (ref 30–149)
TSI SER-ACNC: 1.18 MIU/ML (ref 0.55–4.78)
VLDLC SERPL CALC-MCNC: 47 MG/DL (ref 0–30)
WBC # BLD AUTO: 10.5 X10(3) UL (ref 4–11)

## 2024-01-16 PROCEDURE — 99213 OFFICE O/P EST LOW 20 MIN: CPT | Performed by: FAMILY MEDICINE

## 2024-01-16 PROCEDURE — 80053 COMPREHEN METABOLIC PANEL: CPT

## 2024-01-16 PROCEDURE — 1125F AMNT PAIN NOTED PAIN PRSNT: CPT | Performed by: FAMILY MEDICINE

## 2024-01-16 PROCEDURE — 85025 COMPLETE CBC W/AUTO DIFF WBC: CPT

## 2024-01-16 PROCEDURE — 80061 LIPID PANEL: CPT

## 2024-01-16 PROCEDURE — 36415 COLL VENOUS BLD VENIPUNCTURE: CPT

## 2024-01-16 PROCEDURE — G0439 PPPS, SUBSEQ VISIT: HCPCS | Performed by: FAMILY MEDICINE

## 2024-01-16 PROCEDURE — 84443 ASSAY THYROID STIM HORMONE: CPT

## 2024-01-16 RX ORDER — PREDNISONE 20 MG/1
20 TABLET ORAL DAILY
Qty: 21 TABLET | Refills: 0 | Status: SHIPPED | OUTPATIENT
Start: 2024-01-16 | End: 2024-02-06

## 2024-01-16 NOTE — PATIENT INSTRUCTIONS
All adult screening ordered and done appropriate for patient's age and gender and risk factors and complaints.  Medication reviewed and renewed where needed and appropriate.  Comply with medications.  Monitor blood pressures and record at home. Limit salt intake.  Recommend weight loss via daily exercising and consistent healthy dietary changes.  Encouraged physical fitness and daily physical activity daily.

## 2024-01-16 NOTE — PROGRESS NOTES
Subjective:     Patient ID: Micheal Munoz is a 73 year old male.    This patient is a well-established 73-year-old hypertensive/gout suffering  male here for Medicare annual visit which will also satisfy all the requirements for a complete preventive care physical and for status update on any confirmed chronic medical illnesses and follow up on any previous labs or procedures that were suggestive or in need of further work up. Colonoscopy is due. Bowel and bladder functions are intact.    We also need to follow-up on this patient's echocardiogram which showed an enlarged ascending aorta and also confirmed LVH.    Patient denies headaches, chest pain, dizziness, shortness of breath, visual changes, and/or exertional fatigue.    Patient eligible for seasonal influenza and RSV vaccines and consents to have them done, but he will return for nurse visit as he is planning an out-of-town excursion and does not want to be sick from these immunizations during this time.              History/Other:   Review of Systems  Current Outpatient Medications   Medication Sig Dispense Refill    RSVPreF3 Vac Recomb Adjuvanted 120 MCG/0.5ML Intramuscular Recon Susp Inject 0.5 mL into the muscle one time for 1 dose. Please administer vaccine at pharmacy 1 each 0    predniSONE 20 MG Oral Tab Take 1 tablet (20 mg total) by mouth daily for 21 days. 21 tablet 0    Valsartan-hydroCHLOROthiazide 320-25 MG Oral Tab Take 1 tablet by mouth daily. 90 tablet 3    methylPREDNISolone 4 MG Oral Tablet Therapy Pack Take as directed on package. 1 each 0    methylPREDNISolone (MEDROL) 4 MG Oral Tablet Therapy Pack As directed. 1 each 0    Omeprazole 40 MG Oral Capsule Delayed Release Take 1 capsule (40 mg total) by mouth daily. 90 capsule 3    atorvastatin 40 MG Oral Tab Take 1 tablet (40 mg total) by mouth nightly. 90 tablet 3    metoprolol tartrate 100 MG Oral Tab Take 1 tablet (100 mg total) by mouth 2 (two) times daily. 180 tablet 3     HYDROcodone-acetaminophen 5-325 MG Oral Tab Take 1 tablet by mouth every 6 (six) hours as needed for Pain. 30 tablet 0    AMLODIPINE 5 MG Oral Tab TAKE 1 TABLET BY MOUTH  DAILY 90 tablet 3    Calcium Carbonate-Vitamin D 600-200 MG-UNIT Oral Tab Take by mouth.      aspirin 81 MG Oral Tab EC Take 1 tablet (81 mg total) by mouth daily. 30 tablet 3    Multiple Vitamin (MULTI-VITAMINS) Oral Tab Take  by mouth. take 1 tablet by oral route  every day with food       Allergies:  Allergies   Allergen Reactions    Seasonal        Past Medical History:   Diagnosis Date    Allergic rhinitis     Allergy     allergies    Anxiety state     Cataract     Colon polyps     colonic polyps.  colonoscopy with polypectomy    Coronary atherosclerosis     Esophageal reflux     Heart attack (HCC)     High blood pressure     High cholesterol     Male hypogonadism     Obesity, unspecified     Other and unspecified hyperlipidemia     Pneumonia due to organism     15-20 yrs ago    Sleep apnea     Unspecified essential hypertension     Visual impairment     glasses      Past Surgical History:   Procedure Laterality Date    CABG      CARPAL TUNNEL RELEASE Bilateral 2010    surgery, carpal tunnel syndrome, successful    COLONOSCOPY      COLONOSCOPY      HERNIA SURGERY      umbilical    UPPER GI ENDOSCOPY,EXAM        Family History   Problem Relation Age of Onset    Hypertension Father     Lipids Father     Lipids Mother     Hypertension Mother     Diabetes Mother     Heart Disease Mother         arterial disease    Hypertension Maternal Grandmother     Stroke Maternal Grandmother         CVA (stroke)      Social History:   Social History     Socioeconomic History    Marital status: Single   Tobacco Use    Smoking status: Never    Smokeless tobacco: Never   Vaping Use    Vaping Use: Never used   Substance and Sexual Activity    Alcohol use: Not Currently     Comment: Rarely    Drug use: Yes     Types: Cannabis     Comment: PM time   Other Topics  Concern    Caffeine Concern Yes     Comment: coffee 1-2 cups daily    Exercise No   Social History Narrative    The patient does not use an assistive device..      The patient does live in a home with stairs.        Micheal Munoz's SCREENING SCHEDULE   Tests on this list are recommended by your physician but may not be covered, or covered at this frequency, by your insurer. Please check with your insurance carrier before scheduling to verify coverage.    PREVENTATIVE SERVICES  INDICATIONS AND SCHEDULE Internal Lab or Procedure External Lab or Procedure   Diabetes Screening      HbgA1C   Annually HgbA1C (%)   Date Value   06/20/2023 5.9 (H)         No data to display                Fasting Blood Sugar (FSB) Annually Glucose (mg/dL)   Date Value   01/16/2024 124 (H)       Cardiovascular Disease Screening     LDL Annually LDL Cholesterol (mg/dL)   Date Value   01/16/2024 73        EKG One Time      Colorectal Cancer Screening      Colonoscopy Screen every 10 years Health Maintenance   Topic Date Due    Colorectal Cancer Screening  Never done    Update Health Maintenance if applicable    Flex Sigmoidoscopy Screen every 5 years No results found for this or any previous visit.      No data to display                 Fecal Occult Blood Annually No results found for: \"FOB\", \"OCCULTSTOOL\"      No data to display                Glaucoma Screening      Ophthalmology Visit Annually      Prostate Cancer Screening      PSA  Annually Health Maintenance   Topic Date Due    PSA  06/20/2025     Update Health Maintenance if applicable   Immunizations      Influenza No orders found for this or any previous visit. Update Immunization Activity if applicable    Pneumococcal No orders found for this or any previous visit. Update Immunization Activity if applicable    Hepatitis B No orders found for this or any previous visit. Update Immunization Activity if applicable    Tetanus Orders placed or performed in visit on 11/15/19     TETANUS, DIPHTHERIA TOXOIDS AND ACELLULAR PERTUSIS VACCINE (TDAP), >7 YEARS, IM USE   Orders placed or performed in visit on 09/08/16    TETANUS, DIPHTHERIA TOXOIDS AND ACELLULAR PERTUSIS VACCINE (TDAP), >7 YEARS, IM USE    Update Immunization Activity if applicable    Zoster (Not covered by Medicare Part B) No orders found for this or any previous visit. Update Immunization Activity if applicable     SPECIFIC DISEASE MONITORING Internal Lab or Procedure External Lab or Procedure   Annual Monitoring of Persistent     Medications (ACE/ARB, digoxin, diuretics)    Potassium  Annually Potassium (mmol/L)   Date Value   01/16/2024 3.5     POTASSIUM (P) (mmol/L)   Date Value   08/09/2016 3.2 (L)         No data to display                Creatinine  Annually Creatinine (mg/dL)   Date Value   01/16/2024 1.04         No data to display                Digoxin Serum Conc  Annually No results found for: \"DIGOXIN\"      No data to display                Diabetes      HgbA1C  Annually HgbA1C (%)   Date Value   06/20/2023 5.9 (H)         No data to display                Creat/alb ratio  Annually      LDL  Annually LDL Cholesterol (mg/dL)   Date Value   01/16/2024 73         No data to display                 Dilated Eye exam  Annually      No data to display                   No data to display                COPD      Spirometry Testing Annually No results found for this or any previous visit.      No data to display                    General Health     In the past six months, have you lost more than 10 pounds without trying?: 2 - No    Has your appetite been poor?: No    Type of Diet: Balanced    How does the patient maintain a good energy level?: Other    How would you describe your daily physical activity?: Light    How would you describe your current health state?: Good    How do you maintain positive mental well-being?: Social Interaction         Have you had any immunizations at another office such as Influenza, Hepatitis  B, Tetanus, or Pneumococcal?: Yes     Functional Ability     Bathing or Showering: Able without help    Toileting: Able without help    Dressing: Able without help    Eating: Able without help    Driving: Able without help    Preparing your meals: Able without help    Managing money/bills: Able without help    Taking medications as prescribed: Able without help    Are you able to afford your medications?: No    Hearing Problems?: No     Functional Status     Hearing Problems?: No    Vision Problems? : No    Difficulty walking?: No    Difficulty dressing or bathing?: No    Problems with daily activities? : No    Memory Problems?: No      Fall/Risk Assessment                                                              Depression Screening (PHQ-2/PHQ-9): Over the LAST 2 WEEKS                      Advance Directives     Do you have a healthcare power of ?: Yes    Do you have a living will?: No     Hearing Assessment (Required for AWV/SWV)      Hearing Screening    Time taken: 1/16/2024  9:07 AM  Entry User: Naa Campbell MA  Screening Method: Questionnaire  I have a problem hearing over the telephone: No I have trouble following the conversations when two or more people are talking at the same time: No   I have trouble understanding things on the TV: No I have to strain to understand conversations: No   I have to worry about missing the telephone ring or doorbell: No I have trouble hearing conversations in a noisy background such as a crowded room or restaurant: No   I get confused about where sounds come from: No I misunderstand some words in a sentence and need to ask people to repeat themselves: No   I especially have trouble understanding the speech of women and children: No I have trouble understanding the speaker in a large room such as at a meeting or place of Gnosticist: No   Many people I talk to seem to mumble (or don't speak clearly): No People get annoyed because I misunderstand what they say: No   I  misunderstand what others are saying and make inappropriate responses: No I avoid social activities because I cannot hear well and fear I will reply improperly: No   Family members and friends have told me they think I may have hearing loss: No             Visual Acuity     Right Eye Visual Acuity: Corrected Left Eye Visual Acuity: Corrected   Right Eye Chart Acuity: 20/40 Left Eye Chart Acuity: 20/40     Cognitive Assessment     What day of the week is this?: Correct    What month is it?: Correct    What year is it?: Correct    Recall \"Ball\": Correct    Recall \"Flag\": Correct    Recall \"Tree\": Correct          Objective:   Vitals:    01/16/24 0937   BP: 137/78   Pulse:    Temp:        Physical Exam  Constitutional:       General: He is not in acute distress.     Appearance: Normal appearance. He is not ill-appearing.   HENT:      Head: Normocephalic and atraumatic.      Right Ear: Tympanic membrane normal.      Left Ear: Tympanic membrane normal.      Nose: Nose normal.      Mouth/Throat:      Mouth: Mucous membranes are moist.   Neck:      Thyroid: No thyromegaly.   Cardiovascular:      Rate and Rhythm: Normal rate and regular rhythm.      Heart sounds: Murmur heard.      No gallop.   Pulmonary:      Breath sounds: Normal breath sounds.   Abdominal:      General: Bowel sounds are normal.      Palpations: Abdomen is soft. There is no mass.      Comments: No pulsatile mass   Neurological:      Mental Status: He is alert and oriented to person, place, and time.   Psychiatric:         Mood and Affect: Mood normal.         Assessment & Plan:   1. Medicare annual wellness visit, subsequent  Survey completed.  The following labs have been ordered.  - CBC With Diff; Future  - CMP; Future  - Lipid Panel; Future  - TSH,  Reflex Free T4; Future    2. LVH (left ventricular hypertrophy) due to hypertensive disease, without heart failure  Status update.  - CARD ECHO 2D DOPPLER (CPT=93306); Future    3. Cardiomyopathy,  unspecified type (HCC)  Patient given recommendation to follow-up with cardiologist after echocardiogram has been updated.    4. Ascending aorta enlargement (HCC)  Echo to update the status regarding ascending aorta enlargement    5. Influenza vaccine needed  Patient to return for nurse visit regarding flu vaccine.  - High Dose Fluzone 65 yr and older PFS [40397]    6. Colon cancer screening  Referred.  - Gastro Referral - Mikie ([place])    7. Need for RSV vaccination  Ordered.  Patient to schedule appointment with his pharmacy for this vaccine.  - RSVPreF3 Vac Recomb Adjuvanted 120 MCG/0.5ML Intramuscular Recon Susp; Inject 0.5 mL into the muscle one time for 1 dose. Please administer vaccine at pharmacy  Dispense: 1 each; Refill: 0    8. Gout, unspecified cause, unspecified chronicity, unspecified site  Currently asymptomatic.  Standby prescription for acute flares has been sent to the pharmacy.  - predniSONE 20 MG Oral Tab; Take 1 tablet (20 mg total) by mouth daily for 21 days.  Dispense: 21 tablet; Refill: 0      Orders Placed This Encounter   Procedures    CBC With Diff    CMP    Lipid Panel    TSH,  Reflex Free T4    High Dose Fluzone 65 yr and older PFS [08456]    RSV Vaccine - Arexvy 0.5mL ages 60+ [70009]       Meds This Visit:  Requested Prescriptions     Signed Prescriptions Disp Refills    RSVPreF3 Vac Recomb Adjuvanted 120 MCG/0.5ML Intramuscular Recon Susp 1 each 0     Sig: Inject 0.5 mL into the muscle one time for 1 dose. Please administer vaccine at pharmacy    predniSONE 20 MG Oral Tab 21 tablet 0     Sig: Take 1 tablet (20 mg total) by mouth daily for 21 days.       Imaging & Referrals:  INFLUENZA VAC HIGH DOSE PRSV FREE  RSV VACC PREF RECOMB ADJT IM  GASTRO - INTERNAL  CARD ECHO 2D DOPPLER (CPT=93306)     Patient Instructions   All adult screening ordered and done appropriate for patient's age and gender and risk factors and complaints.  Medication reviewed and renewed where needed and  appropriate.  Comply with medications.  Monitor blood pressures and record at home. Limit salt intake.  Recommend weight loss via daily exercising and consistent healthy dietary changes.  Encouraged physical fitness and daily physical activity daily.    Return in about 1 year (around 1/16/2025), or if symptoms worsen or fail to improve.

## 2024-01-17 DIAGNOSIS — R73.09 ELEVATED RANDOM BLOOD GLUCOSE LEVEL: Primary | ICD-10-CM

## 2024-01-18 RX ORDER — AMLODIPINE BESYLATE 5 MG/1
5 TABLET ORAL DAILY
Qty: 90 TABLET | Refills: 3 | Status: SHIPPED | OUTPATIENT
Start: 2024-01-18

## 2024-01-18 NOTE — TELEPHONE ENCOUNTER
Refill passed per [facility] protocol.  Requested Prescriptions   Pending Prescriptions Disp Refills    AMLODIPINE 5 MG Oral Tab [Pharmacy Med Name: amLODIPine Besylate 5 MG Oral Tablet] 90 tablet 3     Sig: TAKE 1 TABLET BY MOUTH  DAILY       Hypertensive Medications Protocol Passed - 1/17/2024  9:47 PM        Passed - In person appointment in the past 12 or next 3 months     Recent Outpatient Visits              2 days ago LVH (left ventricular hypertrophy) due to hypertensive disease, without heart failure    [facility] Atul Roth DO    Office Visit    3 weeks ago Acute cough    [facility], Virtual Visit Marina Espinoza PA-C    Telemedicine    4 months ago Polyarthritis    [facility] Amanda Snyder MD    Office Visit    4 months ago Positive self-administered antigen test for COVID-19    [facility] Opal Mcgarry APRN    Telemedicine    6 months ago Foraminal stenosis of cervical region    [facility] Atul Roth DO    Office Visit          Future Appointments         Provider Department Appt Notes    In 2 months Amanda Snyder MD [facility] neck f/up  *informed to bring id/ins               Passed - Last BP reading less than 140/90     BP Readings from Last 1 Encounters:   01/16/24 137/78               Passed - CMP or BMP in past 6 months     Recent Results (from the past 4392 hour(s))   CMP    Collection Time: 01/16/24 10:02 AM   Result Value Ref Range    Glucose 124 (H) 70 - 99 mg/dL    Sodium 142 136 - 145 mmol/L    Potassium 3.5 3.5 - 5.1 mmol/L    Chloride 104 98 - 112 mmol/L    CO2 32.0 21.0 - 32.0 mmol/L    Anion Gap 6 0 - 18 mmol/L    BUN 13 9 - 23 mg/dL    Creatinine 1.04 0.70 - 1.30 mg/dL    BUN/CREA Ratio 12.5 10.0 - 20.0    Calcium,  Total 9.8 8.7 - 10.4 mg/dL    Calculated Osmolality 296 (H) 275 - 295 mOsm/kg    eGFR-Cr 75 >=60 mL/min/1.73m2    ALT 13 10 - 49 U/L    AST 20 <=34 U/L    Alkaline Phosphatase 82 45 - 117 U/L    Bilirubin, Total 1.0 0.2 - 1.1 mg/dL    Total Protein 7.0 5.7 - 8.2 g/dL    Albumin 4.4 3.2 - 4.8 g/dL    Globulin  2.6 (L) 2.8 - 4.4 g/dL    A/G Ratio 1.7 1.0 - 2.0    Patient Fasting for CMP? No      *Note: Due to a large number of results and/or encounters for the requested time period, some results have not been displayed. A complete set of results can be found in Results Review.               Passed - In person appointment or virtual visit in the past 6 months     Recent Outpatient Visits              2 days ago LVH (left ventricular hypertrophy) due to hypertensive disease, without heart failure    [facility] Atul Roth DO    Office Visit    3 weeks ago Acute cough    [facility], Virtual Visit Marina Espinoza PA-C    Telemedicine    4 months ago Polyarthritis    [facility] Amanda Snyder MD    Office Visit    4 months ago Positive self-administered antigen test for COVID-19    [facility] Opal Mcgarry APRN    Telemedicine    6 months ago Foraminal stenosis of cervical region    [facility] Atul Roth DO    Office Visit          Future Appointments         Provider Department Appt Notes    In 2 months Amanda Snyder MD [facility] neck f/up  *informed to bring id/ins               Passed - EGFRCR or GFRNAA > 50     GFR Evaluation  EGFRCR: 75 , resulted on 1/16/2024             Future Appointments         Provider Department Appt Notes    In 2 months Amanda Snyder MD [facility] neck f/up  *informed to bring  id/ins          Recent Outpatient Visits              2 days ago LVH (left ventricular hypertrophy) due to hypertensive disease, without heart failure    [facility] Atul Roth DO    Office Visit    3 weeks ago Acute cough    [facility], Virtual Visit Marina Espinoza PA-C    Telemedicine    4 months ago Polyarthritis    [facility]    Office Visit    4 months ago Positive self-administered antigen test [facility] COVID-19    [facility]    [facility]    6 months ago Foraminal stenosis of cervical region    [facility] Atul Roth DO    Office Visit

## 2024-04-15 ENCOUNTER — OFFICE VISIT (OUTPATIENT)
Dept: RHEUMATOLOGY | Facility: CLINIC | Age: 74
End: 2024-04-15

## 2024-04-15 VITALS
HEART RATE: 61 BPM | WEIGHT: 227.63 LBS | SYSTOLIC BLOOD PRESSURE: 156 MMHG | RESPIRATION RATE: 16 BRPM | HEIGHT: 66 IN | DIASTOLIC BLOOD PRESSURE: 77 MMHG | BODY MASS INDEX: 36.58 KG/M2

## 2024-04-15 DIAGNOSIS — R76.8 ELEVATED RHEUMATOID FACTOR: ICD-10-CM

## 2024-04-15 DIAGNOSIS — M54.12 CERVICAL RADICULOPATHY: ICD-10-CM

## 2024-04-15 DIAGNOSIS — M10.9 GOUT, UNSPECIFIED CAUSE, UNSPECIFIED CHRONICITY, UNSPECIFIED SITE: Primary | ICD-10-CM

## 2024-04-15 PROCEDURE — 99214 OFFICE O/P EST MOD 30 MIN: CPT | Performed by: INTERNAL MEDICINE

## 2024-04-15 NOTE — PROGRESS NOTES
Micheal Munoz is a 73 year old male who presents for   Chief Complaint   Patient presents with    Follow - Up     Polyarthritis      Medication Follow-Up   .   HPI:     I had the pleasure of seeing Mciheal Munoz on 9/13/2023 for evaluation.     He is a pleasant 73 year old who has polyarhtirits and positive RF and elevated sed rate 73mm/hr. . He was referred by Dr. Roth.   He had a sudden flare up of his right knee. Any knid of movement it started hurting. The next day he was going to goto the doctor - and went to the ER. In 2/2023  - for right ankle gout.   He's also had a falre up in his left arm. He feels the aches and pain is chronic from getting old. He gets some discmofort in his left elbow.   He then had a bad flare in 3/15/2023 - in his right hand - - with righ arm  thand swellign and pain. He did take steroids for this .   He saw Dr. Weiller for this in 6/2023. The prednisone burst helps each time. It flared one more time so he got another steroid. Both helped.   He then saw Dr. Roth and got a fourth flare    and script for medrolpak.  -he's had total 4-5 .   He was told it was possible gout in the ER. . He was eating egg nog bread when one joint was hurting. Never had a 1st toe that was hurting.   He tried allopurainol 1 a day - for his left elbows pain flare up. This did not help.   Usually the flares was only in one area. Not mutiples joints.   Recently had covid on 8/27/2023 - was positive on home test. Took paxlovid - no major signs.     No hx of fevers. No rashes.       He has chronic neck pain. He feels his neck pain started on his left side but no his right.   Once it starts it get sworse and worse. It's been bothering him now but it's usually a flare up for a few days.   He had it bother him once before.   Right now he has about 8/10 pain -    He has episodes of left side numbness - arm and leg. At times it's just left arm and sometimes left aleg. He had three episodes that are bad.   He calls them TIA like episodes but it's not his neck - it's his brain. It lasts 3-5 minutes.   He has several work up - told it was not a TIA .   Not sure if it's from cervical radiculopathy -   He was told it likely this.     He has no hx of lower back pain - as a teenager - he hurt is back jumping in leaves. When he walks his legs go numb. No pain with this.   No hx of psoriasis.     He feels his pain is traveling from neck to shoulders.     No sob.   Has belching - no abd pain.     Going on vacation to [place] for 2 weeks -     4/15/2024  He saw his PCP in November.   He took one tablet of medrol every 2-3 weeks and that helps like that. For 1 day.   He was given a script of prendisone 20mg one tablets ad ay at a time - he was given this in January as well.   He gets aches and pains.   When pain climbs he takes prednisone and then it goes away.   So no major flares since last visit.   Most of the problem it's his shoulder blades and it creeps to his neck.     He had mri c spine 10/26/2023 - shows mild to moderate stenosis       Wt Readings from Last 2 Encounters:   04/15/24 227 lb 9.6 oz (103.2 kg)   01/16/24 225 lb (102.1 kg)     Body mass index is 36.74 kg/m².      Current Outpatient Medications   Medication Sig Dispense Refill    amLODIPine 5 MG Oral Tab Take 1 tablet (5 mg total) by mouth daily. 90 tablet 3    Valsartan-hydroCHLOROthiazide 320-25 MG Oral Tab Take 1 tablet by mouth daily. 90 tablet 3    Omeprazole 40 MG Oral Capsule Delayed Release Take 1 capsule (40 mg total) by mouth daily. 90 capsule 3    atorvastatin 40 MG Oral Tab Take 1 tablet (40 mg total) by mouth nightly. 90 tablet 3    metoprolol tartrate 100 MG Oral Tab Take 1 tablet (100 mg total) by mouth 2 (two) times daily. 180 tablet 3    HYDROcodone-acetaminophen 5-325 MG Oral Tab Take 1 tablet by mouth every 6 (six) hours as needed for Pain. 30 tablet 0    Calcium Carbonate-Vitamin D 600-200 MG-UNIT Oral Tab Take by mouth.      aspirin 81  MG Oral Tab EC Take 1 tablet (81 mg total) by mouth daily. 30 tablet 3    Multiple Vitamin (MULTI-VITAMINS) Oral Tab Take  by mouth. take 1 tablet by oral route  every day with food      methylPREDNISolone 4 MG Oral Tablet Therapy Pack Take as directed on package. (Patient not taking: Reported on 4/15/2024) 1 each 0      Past Medical History:    Allergic rhinitis    Allergy    allergies    Anxiety state    Cataract    Colon polyps    colonic polyps.  colonoscopy with polypectomy    Coronary atherosclerosis    Esophageal reflux    Heart attack (HCC)    High blood pressure    High cholesterol    Male hypogonadism    Obesity, unspecified    Other and unspecified hyperlipidemia    Pneumonia due to organism    15-20 yrs ago    Sleep apnea    Unspecified essential hypertension    Visual impairment    glasses      Past Surgical History:   Procedure Laterality Date    Cabg      Carpal tunnel release Bilateral 2010    surgery, carpal tunnel syndrome, successful    Colonoscopy      Colonoscopy      Hernia surgery      umbilical    Upper gi endoscopy,exam        Family History   Problem Relation Age of Onset    Hypertension Father     Lipids Father     Lipids Mother     Hypertension Mother     Diabetes Mother     Heart Disease Mother         arterial disease    Hypertension Maternal Grandmother     Stroke Maternal Grandmother         CVA (stroke)      Social History:  Social History     Socioeconomic History    Marital status: Single   Tobacco Use    Smoking status: Never    Smokeless tobacco: Never   Vaping Use    Vaping status: Never Used   Substance and Sexual Activity    Alcohol use: Not Currently     Comment: Rarely    Drug use: Yes     Types: Cannabis     Comment: PM time   Other Topics Concern    Caffeine Concern Yes     Comment: coffee 1-2 cups daily    Exercise No   Social History Narrative    The patient does not use an assistive device..      The patient does live in a home with stairs.      Has partner.      REVIEW  OF SYSTEMS:   Review Of Systems:  Fatigue  Constitutional:No fever, no change in weight or appetitie  Derm: No rashes, no oral ulcers, no alopecia, no photosensitivity, no psoriasis  HEENT: No dry eyes, no dry mouth, no Raynaud's, no nasal ulcers, no parotid swelling, no neck pain, no jaw pain, no temple pain  Eyes: No visual changes,   CVS: No chest pain, no heart disease  RS: No SOB, no Cough, No Pleurtic pain,   GI: No nausea, no vomiiting, no abominal pain, no hx of ulcer, no gastritis, no heartburn, no dyshpagia, no BRBPR or melena  : no dysuria, no hx of miscarriages, no DVT Hx, no hx of OCP,   Neuro: No numbness or tingling, no headache, no hx of seizures,   Psych: no hx of anxiety or depression  ENDO: no hx of thyroid disease, no hx of DM  Joint/Muscluskeltal: see HPI,   All other ROS are negative.     EXAM:   BP (!) 164/79   Pulse 59   Resp 16   Ht 5' 6\" (1.676 m)   Wt 227 lb 9.6 oz (103.2 kg)   BMI 36.74 kg/m²   HEENT: Clear oropharynx, no oral ulcers, EOM intact, clear sclear, PERRLA, pleasant, no acute distress, no CAD,   No rashes  CVS: RRR, no murmurs  RS: CTAB, no crackles, no rhonchi  ABD: Soft Non tender, no HSM felt, BS positive  Joint exam:   neck tendnerness, good ROM,   Right knee not tender or swollen at this time   Left elbow - not tender , fully extension  EXTREMITIES: no cyanosis, clubbing or edema  NEURO: intact touch, 5/5 ue and le strength    Component      Latest Ref Rng 6/20/2023   WBC      4.0 - 11.0 x10(3) uL 12.4 (H)    RBC      3.80 - 5.80 x10(6)uL 4.92    Hemoglobin      13.0 - 17.5 g/dL 13.8    Hematocrit      39.0 - 53.0 % 41.1    MCV      80.0 - 100.0 fL 83.5    MCH      26.0 - 34.0 pg 28.0    MCHC      31.0 - 37.0 g/dL 33.6    RDW-SD      35.1 - 46.3 fL 37.4    RDW      11.0 - 15.0 % 12.4    Platelet Count      150.0 - 450.0 10(3)uL 282.0    Prelim Neutrophil Abs      1.50 - 7.70 x10 (3) uL 9.15 (H)    Neutrophils Absolute      1.50 - 7.70 x10(3) uL 9.15 (H)     Lymphocytes Absolute      1.00 - 4.00 x10(3) uL 1.85    Monocytes Absolute      0.10 - 1.00 x10(3) uL 1.13 (H)    Eosinophils Absolute      0.00 - 0.70 x10(3) uL 0.16    Basophils Absolute      0.00 - 0.20 x10(3) uL 0.03    Immature Granulocyte Absolute      0.00 - 1.00 x10(3) uL 0.06    Neutrophils %      % 74.0    Lymphocytes %      % 14.9    Monocytes %      % 9.1    Eosinophils %      % 1.3    Basophils %      % 0.2    Immature Granulocyte %      % 0.5    Glucose      70 - 99 mg/dL 142 (H)    Sodium      136 - 145 mmol/L 138    Potassium      3.5 - 5.1 mmol/L 3.2 (L)    Chloride      98 - 112 mmol/L 101    Carbon Dioxide, Total      21.0 - 32.0 mmol/L 28.0    ANION GAP      0 - 18 mmol/L 9    BUN      7 - 18 mg/dL 21 (H)    CREATININE      0.70 - 1.30 mg/dL 1.09    BUN/CREATININE RATIO      10.0 - 20.0  19.3    CALCIUM      8.5 - 10.1 mg/dL 9.3    CALCULATED OSMOLALITY      275 - 295 mOsm/kg 291    EGFR      >=60 mL/min/1.73m2 71    ALT (SGPT)      16 - 61 U/L 18    AST (SGOT)      15 - 37 U/L 14 (L)    ALKALINE PHOSPHATASE      45 - 117 U/L 82    Total Bilirubin      0.1 - 2.0 mg/dL 1.1    PROTEIN, TOTAL      6.4 - 8.2 g/dL 7.5    Albumin      3.4 - 5.0 g/dL 3.2 (L)    Globulin      2.8 - 4.4 g/dL 4.3    A/G Ratio      1.0 - 2.0  0.7 (L)    Patient Fasting for CMP? No    Color Urine      Yellow  Yellow    Clarity Urine      Clear  Clear    Spec Gravity      1.005 - 1.030  1.018    Glucose Urine      Normal mg/dL Normal    Bilirubin Urine      Negative  Negative    Ketones, UA      Negative mg/dL Negative    Blood Urine      Negative  Trace !    PH Urine      5.0 - 8.0  6.0    Protein Urine      Negative, Trace mg/dL Negative    Urobilinogen Urine      Normal  Normal    Nitrite Urine      Negative  Negative    Leukocyte Esterase       Negative  Negative    WBC Urine      0 - 5 /HPF 1-5    RBC Urine      0 - 2 /HPF 3-5 !    Bacteria Urine      None Seen /HPF None Seen    Cholesterol, Total      <200 mg/dL 154     HDL Cholesterol      40 - 59 mg/dL 48    Triglycerides      30 - 149 mg/dL 108    LDL Cholesterol Calc      <100 mg/dL 86    VLDL      0 - 30 mg/dL 17    NON-HDL CHOLESTEROL      <130 mg/dL 106    Patient Fasting for Lipid? No    HEMOGLOBIN A1c      <5.7 % 5.9 (H)    ESTIMATED AVERAGE GLUCOSE      68 - 126 mg/dL 123    TSH      0.358 - 3.740 mIU/mL 0.877    PSA Screen      <=4.00 ng/mL 1.50    URIC ACID      3.5 - 7.2 mg/dL 5.9    RHEUMATOID FACTOR      <15 IU/mL 15 (H)    SED RATE      0 - 20 mm/Hr 73 (H)      Component      Latest Ref [facility] 9/13/2023   RHEUMATOID FACTOR      <15 IU/mL 14    C-Citrullinated Peptide IgG AB      0.0 - 6.9 U/mL <0.4    C-REACTIVE PROTEIN      <0.30 mg/dL 0.49 (H)    SED RATE      0 - 20 mm/Hr 18    URIC ACID      3.5 - 7.2 mg/dL 7.1       Component      Latest Ref [facility] 1/16/2024   WBC      4.0 - 11.0 x10(3) uL 10.5    RBC      3.80 - 5.80 x10(6)uL 5.26    Hemoglobin      13.0 - 17.5 g/dL 14.7    Hematocrit      39.0 - 53.0 % 45.1    MCV      80.0 - 100.0 fL 85.7    MCH      26.0 - 34.0 pg 27.9    MCHC      31.0 - 37.0 g/dL 32.6    RDW-SD      35.1 - 46.3 fL 38.6    RDW      11.0 - 15.0 % 12.2    Platelet Count      150.0 - 450.0 10(3)uL 159.0    Prelim Neutrophil Abs      1.50 - 7.70 x10 (3) uL 7.00    Neutrophils Absolute      1.50 - 7.70 x10(3) uL 7.00    Lymphocytes Absolute      1.00 - 4.00 x10(3) uL 2.31    Monocytes Absolute      0.10 - 1.00 x10(3) uL 0.77    Eosinophils Absolute      0.00 - 0.70 x10(3) uL 0.33    Basophils Absolute      0.00 - 0.20 x10(3) uL 0.06    Immature Granulocyte Absolute      0.00 - 1.00 x10(3) uL 0.02    Neutrophils %      % 66.8    Lymphocytes %      % 22.0    Monocytes %      % 7.3    Eosinophils %      % 3.1    Basophils %      % 0.6    Immature Granulocyte %      % 0.2    Glucose      70 - 99 mg/dL 124 (H)    Sodium      136 - 145 mmol/L 142    Potassium      3.5 - 5.1 mmol/L 3.5    Chloride      98 - 112 mmol/L 104    Carbon Dioxide, Total       21.0 - 32.0 mmol/L 32.0    ANION GAP      0 - 18 mmol/L 6    BUN      9 - 23 mg/dL 13    CREATININE      0.70 - 1.30 mg/dL 1.04    BUN/CREATININE RATIO      10.0 - 20.0  12.5    CALCIUM      8.7 - 10.4 mg/dL 9.8    CALCULATED OSMOLALITY      275 - 295 mOsm/kg 296 (H)    EGFR      >=60 mL/min/1.73m2 75    ALT (SGPT)      10 - 49 U/L 13    AST (SGOT)      <=34 U/L 20    ALKALINE PHOSPHATASE      45 - 117 U/L 82    Total Bilirubin      0.2 - 1.1 mg/dL 1.0    PROTEIN, TOTAL      5.7 - 8.2 g/dL 7.0    Albumin      3.2 - 4.8 g/dL 4.4    Globulin      2.8 - 4.4 g/dL 2.6 (L)    A/G Ratio      1.0 - 2.0  1.7    Patient Fasting for CMP? No    Cholesterol, Total      <200 mg/dL 158    HDL Cholesterol      40 - 59 mg/dL 37 (L)    Triglycerides      30 - 149 mg/dL 301 (H)    LDL Cholesterol Calc      <100 mg/dL 73    VLDL      0 - 30 mg/dL 47 (H)    NON-HDL CHOLESTEROL      <130 mg/dL 121    Patient Fasting for Lipid? No    TSH      0.550 - 4.780 mIU/mL 1.182       Legend:  (H) High  (L) Low  10.26/2023 - mri c spine  1. Stable mild-to-moderate multilevel spondylosis resulting in mild-to-moderate central vertebral canal stenosis at C5-C6 and C6-C7, as well as mild central canal stenosis at C3-C4 and C4-C5.  Neural foraminal stenoses are also seen bilaterally at   multiple levels, as detailed above.         ASSESSMENT AND PLAN:   Micheal Munoz is a 73 year old male who presents for   Chief Complaint   Patient presents with    Follow - Up     Polyarthritis      Medication Follow-Up     Intermittent migratory polyarthritis - started in 2/2023 - with right knee, then right hand/arm, then left elbow, and now his neck x 2   - likely gout - and not rheumatoid arthritis, spondylitis versus reactive arthritis - RF is 15/14 - borderline   -no hx of of infection prior to his first episode   - not a big drinker to suspect - once a month - - but does eat red meat   -  mri cervical spine - cervical radiculopahty - with stenosis - if he  moves his neck wrong he can have left sided numbness and tingling.=not at a point where he wants surgery - so intermittent steroids helping the shoulders and right side lower back and shoulder blades.   - hx of gout - rare - - it's intermittent -   - Check uric acid rf, and sed rate again- Return to clinic in 6- 12months if flare ups are a lot -     2. Hx of CAD- after bypass - cut back on eating - and lost 40lb -   3. Left sided numbnes - intermrtnt with increasing neck pain - intermittent prednisone helping. - sometimes it is related to neck - is there since 2021 - if he moves his head wrong he can trigger one - prednisone 20mg ad ay -     Summary:  Check uric acid rf, and sed rate again  Return to clinic in 6- 12months if flare ups are a lot -       Amanda Snyder MD  4/15/2024   10:50 AM

## 2024-06-06 DIAGNOSIS — M54.16 LUMBAR RADICULOPATHY: Primary | ICD-10-CM

## 2024-06-06 DIAGNOSIS — M54.2 CERVICALGIA: ICD-10-CM

## 2024-06-06 RX ORDER — PREDNISONE 20 MG/1
40 TABLET ORAL 2 TIMES DAILY
Qty: 20 TABLET | Refills: 0 | Status: SHIPPED | OUTPATIENT
Start: 2024-06-06 | End: 2024-06-11

## 2024-07-03 DIAGNOSIS — I10 ESSENTIAL HYPERTENSION: ICD-10-CM

## 2024-07-05 ENCOUNTER — HOSPITAL ENCOUNTER (OUTPATIENT)
Dept: CV DIAGNOSTICS | Facility: HOSPITAL | Age: 74
Discharge: HOME | End: 2024-07-05
Attending: FAMILY MEDICINE
Payer: MEDICARE

## 2024-07-05 DIAGNOSIS — K21.9 GASTROESOPHAGEAL REFLUX DISEASE: ICD-10-CM

## 2024-07-05 DIAGNOSIS — I11.9 LVH (LEFT VENTRICULAR HYPERTROPHY) DUE TO HYPERTENSIVE DISEASE, WITHOUT HEART FAILURE: ICD-10-CM

## 2024-07-05 PROCEDURE — 93306 TTE W/DOPPLER COMPLETE: CPT | Performed by: FAMILY MEDICINE

## 2024-07-08 RX ORDER — METOPROLOL TARTRATE 100 MG/1
100 TABLET ORAL 2 TIMES DAILY
Qty: 180 TABLET | Refills: 3 | Status: SHIPPED | OUTPATIENT
Start: 2024-07-08

## 2024-07-08 NOTE — TELEPHONE ENCOUNTER
Please review. Protocol Failed; No Protocol    Requested Prescriptions   Pending Prescriptions Disp Refills    METOPROLOL TARTRATE 100 MG Oral Tab [Pharmacy Med Name: METOPROLOL TARTRATE 100MG TABLETS] 180 tablet 3     Sig: TAKE 1 TABLET(100 MG) BY MOUTH TWICE DAILY       Hypertension Medications Protocol Failed - 7/3/2024  8:39 AM        Failed - Last BP reading less than 140/90     BP Readings from Last 1 Encounters:   04/15/24 156/77               Passed - CMP or BMP in past 12 months        Passed - In person appointment or virtual visit in the past 12 mos or appointment in next 3 mos     Recent Outpatient Visits              2 months ago Gout, unspecified cause, unspecified chronicity, unspecified site    [facility] Amanda Snyder MD    Office Visit    5 months ago LVH (left ventricular hypertrophy) due to hypertensive disease, without heart failure    [facility] Atul Roth DO    Office Visit    6 months ago Acute cough    [facility], Virtual Visit Marina Espinoza PA-C    Telemedicine    9 months ago [facility]arthritis    [facility]    Office Visit    10 months ago Positive self-administered antigen test for COVID-19    [facility] Opal Mcgarry APRN    Telemedicine          Future Appointments         Provider Department Appt Notes    In 1 month Amanda Snyder MD [facility] 4 mo f/u                    Passed - EGFRCR or GFRNAA > 50     GFR Evaluation  EGFRCR: 75 , resulted on 1/16/2024                 Future Appointments         Provider Department Appt Notes    In 1 month [facility] 4 [facility] f[facility]u          Recent Outpatient Visits              2 months ago Gout, unspecified  cause, unspecified chronicity, unspecified site    [facility], [facility], Amanda Weller MD    Office Visit    5 months ago LVH (left ventricular hypertrophy) due to hypertensive disease, without heart failure    [facility], [facility] [place]    Office Visit    6 months ago Acute cough    [facility], Virtual Visit Marina Espinoza PA-C    Telemedicine    9 months ago Polyarthritis    [facility], Amanda Weller MD    Office Visit    10 months ago Positive self-administered antigen test for COVID-19    [facility], [facility], Opal Case APRN    Telemedicine

## 2024-07-10 RX ORDER — ATORVASTATIN CALCIUM 40 MG/1
40 TABLET, FILM COATED ORAL NIGHTLY
Qty: 90 TABLET | Refills: 3 | Status: SHIPPED | OUTPATIENT
Start: 2024-07-10

## 2024-07-10 RX ORDER — OMEPRAZOLE 40 MG/1
40 CAPSULE, DELAYED RELEASE ORAL DAILY
Qty: 90 CAPSULE | Refills: 3 | Status: SHIPPED | OUTPATIENT
Start: 2024-07-10

## 2024-07-10 NOTE — TELEPHONE ENCOUNTER
Refill passed per [facility] protocol.  Requested Prescriptions   Pending Prescriptions Disp Refills    OMEPRAZOLE 40 MG Oral Capsule Delayed Release [Pharmacy Med Name: Omeprazole 40 MG Oral Capsule Delayed Release] 90 capsule 3     Sig: TAKE 1 CAPSULE BY MOUTH DAILY       Gastrointestional Medication Protocol Passed - 7/5/2024  9:50 PM        Passed - In person appointment or virtual visit in the past 12 mos or appointment in next 3 mos     Recent Outpatient Visits              2 months ago Gout, unspecified cause, unspecified chronicity, unspecified site    [facility]    Office Visit    5 months ago LVH (left ventricular hypertrophy) due to hypertensive disease, without heart failure    [facility] Atul Roth DO    Office Visit    6 months ago Acute cough    [facility], Virtual Visit Marina Espinoza PA-C    Telemedicine    10 months ago Polyarthritis    [facility] Amanda Snyder MD    Office Visit    10 months ago Positive self-administered antigen test for COVID-19    [facility] Opal Mcgarry APRN    Telemedicine          Future Appointments         Provider Department Appt Notes    In 1 month Amanda Snyder MD [facility] 4 mo f/u                      ATORVASTATIN 40 MG Oral Tab [Pharmacy Med Name: Atorvastatin Calcium 40 MG Oral Tablet] 90 tablet 3     Sig: TAKE 1 TABLET BY MOUTH NIGHTLY       Cholesterol Medication Protocol Passed - 7/5/2024  9:50 PM        Passed - ALT < 80     Lab Results   Component Value Date    ALT 13 01/16/2024             Passed - ALT resulted within past year        Passed - Lipid panel within past 12 months     Lab Results   Component Value Date    CHOLEST 158 01/16/2024    TRIG 301 (H) 01/16/2024    HDL  37 (L) 01/16/2024    LDL 73 01/16/2024    VLDL 47 (H) 01/16/2024    NONHDLC 121 01/16/2024             Passed - In person appointment or virtual visit in the past 12 mos or appointment in next 3 mos     Recent Outpatient Visits              2 months ago Gout, unspecified cause, unspecified chronicity, unspecified site    [facility]    Office Visit    5 months ago LVH (left ventricular hypertrophy) due to hypertensive disease, without heart failure    [facility] Atul Roth,     Office Visit    6 months ago Acute cough    [facility], Virtual Visit Marina Espinoza PA-C    Telemedicine    10 months ago Polyarthritis    [facility]    [facility] Visit    10 months ago Positive self-administered antigen test for COVID-19    [facility] Opal Mcgarry APRN    Telemedicine          Future Appointments         Provider Department Appt Notes    In 1 month [facility]                       Recent Outpatient Visits              2 months ago Gout, unspecified cause, unspecified chronicity, unspecified site    [facility] Amanda Weller MD    Office Visit    5 months ago LVH (left ventricular hypertrophy) due to hypertensive disease, without heart failure    [facility] Atul Roth DO    Office Visit    6 months ago Acute cough    [facility], Virtual Visit Marina Espinoza PA-C    Telemedicine    10 months ago Polyarthritis    [facility]    Office Visit    10 months ago Positive self-administered antigen test for COVID-19    [place]  [facility] Opal Mcgarry APRN    Telemedicine          Future Appointments         Provider Department Appt Notes    In 1 month Amanda Snyder MD [facility] 4 mo f/u

## 2024-08-23 ENCOUNTER — TELEPHONE (OUTPATIENT)
Dept: FAMILY MEDICINE CLINIC | Facility: CLINIC | Age: 74
End: 2024-08-23

## 2024-08-23 NOTE — TELEPHONE ENCOUNTER
Spoke to pt in regards to blood pressure check f/u. Pt was able to provide me with current reading. Chart has been updated.

## 2024-09-03 DIAGNOSIS — I10 ESSENTIAL HYPERTENSION: ICD-10-CM

## 2024-09-06 NOTE — TELEPHONE ENCOUNTER
Please Review. Protocol Failed; No Protocol   BP Readings from Last 1 Encounters:   04/15/24 156/77     Requested Prescriptions   Pending Prescriptions Disp Refills    VALSARTAN-HYDROCHLOROTHIAZIDE 320-25 MG Oral Tab [Pharmacy Med Name: Valsartan-hydroCHLOROthiazide 320-25 MG Oral Tablet] 90 tablet 3     Sig: Take 1 tablet by mouth daily.       Hypertension Medications Protocol Failed - 9/6/2024  9:52 AM        Failed - Last BP reading less than 140/90     BP Readings from Last 1 Encounters:   04/15/24 156/77               Passed - CMP or BMP in past 12 months        Passed - In person appointment or virtual visit in the past 12 mos or appointment in next 3 mos     Recent Outpatient Visits              4 months ago Gout, unspecified cause, unspecified chronicity, unspecified site    [facility], Amanda Weller MD    Office Visit    7 months ago LVH (left ventricular hypertrophy) due to hypertensive disease, without heart failure    [facility] Atul Roth DO    Office Visit    8 months ago Acute cough    [facility], Virtual Visit Marina Espinoza PA-C    Telemedicine    11 months ago Polyarthritis    [facility]    [facility] Visit    1 year ago Positive self-administered antigen test for COVID-19    [facility] [place]    Telemedicine                      Passed - EGFRCR or GFRNAA > 50     GFR Evaluation  EGFRCR: 75 , resulted on 1/16/2024                   Recent Outpatient Visits              4 months ago Gout, unspecified cause, unspecified chronicity, unspecified site    [facility]    [facility] Visit    7 months ago LVH (left ventricular hypertrophy) due to hypertensive disease, without heart failure    [facility] Atul Roth DO    Office Visit    8 months ago Acute cough    [facility], Virtual Visit Marina Espinoza PA-C    Telemedicine    11 months ago Polyarthritis    [facility], [facility], Amanda Weller MD    Office Visit    1 year ago Positive self-administered antigen test for COVID-19    [facility], [facility], Opal Case APRN    Telemedicine

## 2024-09-07 RX ORDER — VALSARTAN AND HYDROCHLOROTHIAZIDE 320; 25 MG/1; MG/1
1 TABLET, FILM COATED ORAL DAILY
Qty: 90 TABLET | Refills: 3 | Status: SHIPPED | OUTPATIENT
Start: 2024-09-07

## 2024-09-19 ENCOUNTER — LAB ENCOUNTER (OUTPATIENT)
Dept: LAB | Age: 74
End: 2024-09-19
Attending: INTERNAL MEDICINE
Payer: MEDICARE

## 2024-09-19 DIAGNOSIS — R76.8 ELEVATED RHEUMATOID FACTOR: ICD-10-CM

## 2024-09-19 DIAGNOSIS — R73.09 ELEVATED RANDOM BLOOD GLUCOSE LEVEL: ICD-10-CM

## 2024-09-19 DIAGNOSIS — M10.9 GOUT, UNSPECIFIED CAUSE, UNSPECIFIED CHRONICITY, UNSPECIFIED SITE: ICD-10-CM

## 2024-09-19 DIAGNOSIS — M54.12 CERVICAL RADICULOPATHY: ICD-10-CM

## 2024-09-19 LAB
ERYTHROCYTE [SEDIMENTATION RATE] IN BLOOD: 12 MM/HR
EST. AVERAGE GLUCOSE BLD GHB EST-MCNC: 131 MG/DL (ref 68–126)
HBA1C MFR BLD: 6.2 % (ref ?–5.7)
RHEUMATOID FACT SERPL-ACNC: 17.8 IU/ML (ref ?–14)
URATE SERPL-MCNC: 7.4 MG/DL

## 2024-09-19 PROCEDURE — 86431 RHEUMATOID FACTOR QUANT: CPT

## 2024-09-19 PROCEDURE — 83036 HEMOGLOBIN GLYCOSYLATED A1C: CPT

## 2024-09-19 PROCEDURE — 85652 RBC SED RATE AUTOMATED: CPT

## 2024-09-19 PROCEDURE — 84550 ASSAY OF BLOOD/URIC ACID: CPT

## 2024-09-19 PROCEDURE — 36415 COLL VENOUS BLD VENIPUNCTURE: CPT

## 2024-09-24 ENCOUNTER — TELEPHONE (OUTPATIENT)
Dept: FAMILY MEDICINE CLINIC | Facility: CLINIC | Age: 74
End: 2024-09-24

## 2024-09-26 ENCOUNTER — PATIENT MESSAGE (OUTPATIENT)
Dept: FAMILY MEDICINE CLINIC | Facility: CLINIC | Age: 74
End: 2024-09-26

## 2024-09-26 DIAGNOSIS — M54.2 CERVICALGIA: ICD-10-CM

## 2024-09-26 DIAGNOSIS — M54.16 LUMBAR RADICULOPATHY: ICD-10-CM

## 2024-09-27 RX ORDER — PREDNISONE 20 MG/1
40 TABLET ORAL 2 TIMES DAILY
Qty: 20 TABLET | Refills: 0 | Status: SHIPPED | OUTPATIENT
Start: 2024-09-27 | End: 2024-10-02

## 2024-09-27 NOTE — TELEPHONE ENCOUNTER
Diamond Ring RN 9/27/2024 8:32 AM CDT        ----- Message -----  From: Micheal Munoz  Sent: 9/26/2024 12:06 PM CDT  To: Em Rn Triage  Subject: Need prescription renewal     I am down to 5 20mg prednisone tabs. I seem to be having more flareups than in the past. I think in Nov, for my next checkup you might need to prescribe a small dose that I take to deal with the chronic pain. The last prescription was dated in June of this year for your FYI. Thanking you now for your assistance in this matter.

## 2024-11-06 ENCOUNTER — HOSPITAL ENCOUNTER (OUTPATIENT)
Dept: CT IMAGING | Facility: HOSPITAL | Age: 74
Discharge: HOME | End: 2024-11-06
Attending: INTERNAL MEDICINE
Payer: MEDICARE

## 2024-11-06 DIAGNOSIS — I71.21 ANEURYSM OF THE ASCENDING AORTA, WITHOUT RUPTURE (HCC): ICD-10-CM

## 2024-11-06 LAB
CREAT BLD-MCNC: 1.1 MG/DL
EGFRCR SERPLBLD CKD-EPI 2021: 70 ML/MIN/1.73M2 (ref 60–?)

## 2024-11-06 PROCEDURE — 82565 ASSAY OF CREATININE: CPT

## 2024-11-06 PROCEDURE — 71275 CT ANGIOGRAPHY CHEST: CPT | Performed by: INTERNAL MEDICINE

## 2024-11-11 ENCOUNTER — PATIENT MESSAGE (OUTPATIENT)
Dept: FAMILY MEDICINE CLINIC | Facility: CLINIC | Age: 74
End: 2024-11-11

## 2024-11-11 DIAGNOSIS — Q80.0 ICHTHYOSIS VULGARIS: ICD-10-CM

## 2024-11-11 RX ORDER — AMMONIUM LACTATE 12 G/100G
1 LOTION TOPICAL 2 TIMES DAILY
Qty: 225 G | Refills: 0 | Status: SHIPPED | OUTPATIENT
Start: 2024-11-11

## 2024-12-02 RX ORDER — AMLODIPINE BESYLATE 5 MG/1
5 TABLET ORAL DAILY
Qty: 90 TABLET | Refills: 0 | Status: SHIPPED | OUTPATIENT
Start: 2024-12-02

## 2024-12-02 NOTE — TELEPHONE ENCOUNTER
Please review; protocol failed/ has no protocol    Message sent for patient to make an appointment.     Requested Prescriptions   Pending Prescriptions Disp Refills    AMLODIPINE 5 MG Oral Tab [Pharmacy Med Name: amLODIPine Besylate 5 MG Oral Tablet] 90 tablet 3     Sig: TAKE 1 TABLET BY MOUTH DAILY       Hypertension Medications Protocol Failed - 12/2/2024  8:08 AM        Failed - Last BP reading less than 140/90     BP Readings from Last 1 Encounters:   04/15/24 156/77               Passed - CMP or BMP in past 12 months        Passed - In person appointment or virtual visit in the past 12 mos or appointment in next 3 mos     Recent Outpatient Visits              7 months ago Gout, unspecified cause, unspecified chronicity, unspecified site    [facility]    [facility]    10 months ago LVH (left ventricular hypertrophy) due to hypertensive disease, without heart failure    [facility] Atul Roth DO    Office Visit    11 months ago Acute cough    [facility], Virtual Visit Marina Espinoza PA-C    Telemedicine    1 year ago Polyarthritis    [facility]    [facility] Visit    1 year ago Positive self-administered antigen test for COVID-19    [facility] [place]    Telemedicine                      Passed - EGFRCR or GFRNAA > 50     GFR Evaluation  EGFRCR: 70 , resulted on 11/6/2024             Recent Outpatient Visits              7 months ago Gout, unspecified cause, unspecified chronicity, unspecified site    [facility]    Office Visit    10 months ago LVH (left ventricular hypertrophy) due to hypertensive disease, without heart failure    [facility]  Atul Roth DO    Office Visit    11 months ago Acute cough    [facility], Virtual Visit Marina Espinoza PA-C    Telemedicine    1 year ago Polyarthritis    [facility] [place]    Office Visit    1 year ago Positive self-administered antigen test for COVID-19    [facility], [facility], [place]    Telemedicine

## 2024-12-30 ENCOUNTER — PATIENT MESSAGE (OUTPATIENT)
Dept: FAMILY MEDICINE CLINIC | Facility: CLINIC | Age: 74
End: 2024-12-30

## 2024-12-30 DIAGNOSIS — M54.2 CERVICALGIA: ICD-10-CM

## 2024-12-30 DIAGNOSIS — M54.16 LUMBAR RADICULOPATHY: ICD-10-CM

## 2025-01-01 RX ORDER — PREDNISONE 20 MG/1
40 TABLET ORAL 2 TIMES DAILY
Qty: 20 TABLET | Refills: 0 | Status: SHIPPED | OUTPATIENT
Start: 2025-01-01 | End: 2025-01-06

## 2025-02-04 ENCOUNTER — PATIENT MESSAGE (OUTPATIENT)
Dept: FAMILY MEDICINE CLINIC | Facility: CLINIC | Age: 75
End: 2025-02-04

## 2025-02-05 NOTE — TELEPHONE ENCOUNTER
From  Carlita Hagen RN To  Micheal Munoz Sent and Delivered  2/4/2025  3:01 PM   Last Read in MyChart  2/5/2025  5:58 AM by Micheal Munoz

## 2025-02-15 NOTE — TELEPHONE ENCOUNTER
Please review; protocol failed/ has no protocol      No active /future labs noted   Please see message below for upcoming appointment.    Future Appointments   Date Time Provider Department Center   6/6/2025 10:20 AM Atul Roth DO [facility]       Requested Prescriptions   Pending Prescriptions Disp Refills    AMLODIPINE 5 MG Oral Tab [Pharmacy Med Name: amLODIPine Besylate 5 MG Oral Tablet] 90 tablet 3     Sig: TAKE 1 TABLET BY MOUTH DAILY       Hypertension Medications Protocol Failed - 2/15/2025  9:50 AM        Failed - CMP or BMP in past 12 months        Failed - Last BP reading less than 140/90     BP Readings from Last 1 Encounters:   04/15/24 156/77               Failed - In person appointment or virtual visit in the past 12 mos or appointment in next 3 mos     Recent Outpatient Visits              10 months ago Gout, unspecified cause, unspecified chronicity, unspecified site    [facility]    [facility]    1 year ago LVH (left ventricular hypertrophy) due to hypertensive disease, without heart failure    [facility] Atul Roth DO    Office Visit    1 year ago Acute cough    [facility], Virtual Visit Marina Espinoza PA-C    Telemedicine    1 year ago Polyarthritis    [facility]    [facility]    1 year ago Positive self-administered antigen test for COVID-19    [facility], Opal Case APRN    Telemedicine          Future Appointments         Provider Department Appt Notes    In 3 months Atul Roth DO [facility] annual Medicare wellness exam                    Passed - EGFRCR or GFRNAA > 50     GFR Evaluation  EGFRCR: 70 , resulted on 11/6/2024          Passed - Medication is active on med  list           Recent Outpatient Visits              10 months ago Gout, unspecified cause, unspecified chronicity, unspecified site    [facility]    [facility]    1 year ago LVH (left ventricular hypertrophy) due to hypertensive disease, without heart failure    [facility] [place] Atul Roth DO    Office Visit    1 year ago Acute cough    [facility], Virtual Visit Marina Espinoza PA-C    Telemedicine    1 year ago Polyarthritis    [facility]    [facility]    1 year ago Positive self-administered antigen test for COVID-19    [facility]    [facility]          Future Appointments         Provider Department Appt Notes    In 3 months Atul Roth DO [facility] [place] annual Medicare wellness exam

## 2025-02-16 RX ORDER — AMLODIPINE BESYLATE 5 MG/1
5 TABLET ORAL DAILY
Qty: 30 TABLET | Refills: 0 | Status: SHIPPED | OUTPATIENT
Start: 2025-02-16

## 2025-03-10 RX ORDER — AMLODIPINE BESYLATE 5 MG/1
5 TABLET ORAL DAILY
Qty: 30 TABLET | Refills: 11 | Status: SHIPPED | OUTPATIENT
Start: 2025-03-10

## 2025-03-15 ENCOUNTER — PATIENT MESSAGE (OUTPATIENT)
Dept: FAMILY MEDICINE CLINIC | Facility: CLINIC | Age: 75
End: 2025-03-15

## 2025-03-27 ENCOUNTER — TELEPHONE (OUTPATIENT)
Dept: FAMILY MEDICINE CLINIC | Facility: CLINIC | Age: 75
End: 2025-03-27

## 2025-03-27 NOTE — TELEPHONE ENCOUNTER
PRIOR AUTH:   diazePAM (VALIUM) 2 MG Oral Tab Take 1 tablet (2 mg total) by mouth every 6 (six) hours as needed for Anxiety. 30 tablet 0   KEY:[id number]  LAST NAME:KWABENA CROWDER 05/02/1950

## 2025-03-27 NOTE — TELEPHONE ENCOUNTER
Closed   3/27/2025  1:57 PM  Close reason: Prior Authorization not required for patient/medication

## 2025-03-29 DIAGNOSIS — F43.21 GRIEF REACTION: ICD-10-CM

## 2025-03-29 RX ORDER — DIAZEPAM 2 MG/1
2 TABLET ORAL EVERY 6 HOURS PRN
Qty: 30 TABLET | Refills: 0 | Status: SHIPPED | OUTPATIENT
Start: 2025-03-29

## 2025-03-29 NOTE — TELEPHONE ENCOUNTER
Patient called reports diazepam was sent to mail order pharmacy and issue with his credit card and they cancelled it. Would like the refill sent to his local pharmacy please. Has 3 pills left. Script pended.

## 2025-04-24 NOTE — DIETARY NOTE
Irma Fabian MD [facility] Jackson Care Team Pool2 hours ago (11:10 AM)       No further input.  I would trust to the providers taking care of him acutely with regards to need for any additional evaluations and treatment.    Dr. Jackson       Relayed via Intellutiont to patients spouse.     Anish TORRES RN, BSN  [facility]     NUTRITION:  Diet Education    Consult received for diet education per protocol. Education deferred until s/p intervention and when appropriate for teaching. Pt still sedated at time of visit. POD#0 s/p CABG x5. Patient/family visited.  Nutrition care di

## 2025-04-29 DIAGNOSIS — F43.20 GRIEF REACTION: ICD-10-CM

## 2025-05-01 RX ORDER — DIAZEPAM 2 MG/1
2 TABLET ORAL EVERY 6 HOURS PRN
Qty: 30 TABLET | Refills: 0 | OUTPATIENT
Start: 2025-05-01

## 2025-05-01 NOTE — TELEPHONE ENCOUNTER
Please let patient know I really would recommend weaning off of daily Valium at this time.  He has been using it for about 6 weeks, and continued daily use is likely to be habit-forming.  Please check on other potential supports-grief group, establishing with therapist, mindfulness exercises.

## 2025-05-01 NOTE — TELEPHONE ENCOUNTER
Please review. Refill failed protocol.     Patient Comment: I still have 5 pills left. I only use a half of a pill a day unless I am having a bad day and I take the other half after 1:00 pm usually.     Recent fills each # 30 : 3/29/25  Last prescription written: 03/29/25  Last office visit:  3/20/2025    Future Appointments   Date Time Provider Department Center   6/6/2025 10:20 AM [facility]

## 2025-05-02 ENCOUNTER — NURSE TRIAGE (OUTPATIENT)
Dept: FAMILY MEDICINE CLINIC | Facility: CLINIC | Age: 75
End: 2025-05-02

## 2025-05-02 ENCOUNTER — PATIENT MESSAGE (OUTPATIENT)
Dept: FAMILY MEDICINE CLINIC | Facility: CLINIC | Age: 75
End: 2025-05-02

## 2025-05-02 DIAGNOSIS — F43.20 GRIEF REACTION: ICD-10-CM

## 2025-05-02 DIAGNOSIS — F32.A DEPRESSION, UNSPECIFIED DEPRESSION TYPE: Primary | ICD-10-CM

## 2025-05-02 NOTE — TELEPHONE ENCOUNTER
Action Requested: Summary for Provider     []  Critical Lab, Recommendations Needed  [x] Need Additional Advice  []   FYI    []   Need Orders  [] Need Medications Sent to Pharmacy  []  Other     SUMMARY: Patient reports feelings of sadness, overwhelmed    Reason for call: Depression  Onset: March    Patient reports that life partner recently passed away. Patient is expressing feelings of sadness, suicidal thoughts and being overwhelmed. States that he has had thoughts of harming himself but with no plan. States that he is upset at the thought. Patient is not speaking to therapist. Patient states he does have adequate support. Denies using drugs or alcohol. Patient requesting refill of medication. Please advise.                       Reason for Disposition   Recent death of a loved one    Protocols used: Depression-A-OH

## 2025-05-02 NOTE — TELEPHONE ENCOUNTER
Patient calling (verified full name and date of birth).  Relayed Dr. Marshall message and patient voiced understanding with treatment plan

## 2025-05-03 NOTE — TELEPHONE ENCOUNTER
Spoke to patient he is asking for a refill on the diazepam. He states he only got one prescription for diazepam. He doesn't understand why he can't take this as he is not abusing it. It helps take the edge off so he can get through the day.     The mail order one was canceled and it was last filled 3/29/25 at [facility].    Patient has already been provided information for , [facility] for grief counseling.

## 2025-05-03 NOTE — TELEPHONE ENCOUNTER
Did you give the patient information for our behavioral health counselor at [place]-Jasmine Singh?  Please give the patient this information, as he is going to need certified, professional help regarding the loss of his partner

## 2025-05-04 RX ORDER — DIAZEPAM 2 MG/1
2 TABLET ORAL EVERY 6 HOURS PRN
Qty: 30 TABLET | Refills: 0 | Status: SHIPPED | OUTPATIENT
Start: 2025-05-04

## 2025-05-04 NOTE — TELEPHONE ENCOUNTER
Please let him know I have sent Rx to pharmacy.  But I strongly recommend reducing frequency of use to maximum of 3 days/week.  Daily use not only habit-forming, but also increases risk for falls, memory problems.  Can discuss further with upcoming visit with PCP.

## 2025-05-15 DIAGNOSIS — K21.9 GASTROESOPHAGEAL REFLUX DISEASE: ICD-10-CM

## 2025-05-16 RX ORDER — OMEPRAZOLE 40 MG/1
40 CAPSULE, DELAYED RELEASE ORAL DAILY
Qty: 90 CAPSULE | Refills: 3 | Status: SHIPPED | OUTPATIENT
Start: 2025-05-16

## 2025-05-16 RX ORDER — ATORVASTATIN CALCIUM 40 MG/1
40 TABLET, FILM COATED ORAL NIGHTLY
Qty: 90 TABLET | Refills: 3 | Status: SHIPPED | OUTPATIENT
Start: 2025-05-16

## 2025-05-16 NOTE — TELEPHONE ENCOUNTER
Please Review. Protocol Failed; No Protocol     Requested Prescriptions   Pending Prescriptions Disp Refills    atorvastatin 40 MG Oral Tab [Pharmacy Med Name: Atorvastatin Calcium 40 MG Oral Tablet] 90 tablet 3     Sig: Take 1 tablet (40 mg total) by mouth nightly.       Cholesterol Medication Protocol Failed - 5/16/2025 10:24 AM        Failed - ALT < 80     Lab Results   Component Value Date    ALT 13 01/16/2024             Failed - ALT resulted within past year        Failed - Lipid panel within past 12 months     Lab Results   Component Value Date    CHOLEST 158 01/16/2024    TRIG 301 (H) 01/16/2024    HDL 37 (L) 01/16/2024    LDL 73 01/16/2024    VLDL 47 (H) 01/16/2024    NONHDLC 121 01/16/2024             Passed - In person appointment or virtual visit in the past 12 mos or appointment in next 3 mos

## 2025-05-16 NOTE — TELEPHONE ENCOUNTER
Refill Per Protocol     Requested Prescriptions   Pending Prescriptions Disp Refills    ATORVASTATIN 40 MG Oral Tab [Pharmacy Med Name: Atorvastatin Calcium 40 MG Oral Tablet] 90 tablet 3     Sig: TAKE 1 TABLET BY MOUTH NIGHTLY       Cholesterol Medication Protocol Failed - 5/16/2025 10:24 AM        Failed - ALT < 80     Lab Results   Component Value Date    ALT 13 01/16/2024             Failed - ALT resulted within past year        Failed - Lipid panel within past 12 months     Lab Results   Component Value Date    CHOLEST 158 01/16/2024    TRIG 301 (H) 01/16/2024    HDL 37 (L) 01/16/2024    LDL 73 01/16/2024    VLDL 47 (H) 01/16/2024    NONHDLC 121 01/16/2024             Passed - In person appointment or virtual visit in the past 12 mos or appointment in next 3 mos     Recent Outpatient Visits              1 month ago Grief reaction    [facility] Brielle Marshall MD    Office Visit    1 year ago Gout, unspecified cause, unspecified chronicity, unspecified site    [facility]    Office Visit    1 year ago LVH (left ventricular hypertrophy) due to hypertensive disease, without heart failure    [facility] Atul Roth DO    Office Visit    1 year ago Acute cough    [facility], Virtual Visit Marina Espinoza PA-C    Telemedicine    1 year ago Polyarthritis    [facility]    [facility]          Future Appointments         Provider Department Appt Notes    In 3 weeks Atlu Roth DO [facility] 1/16/2024 LAST PX annual Medicare wellness exam                    Passed - Medication is active on med list          OMEPRAZOLE 40 MG Oral Capsule Delayed Release [Pharmacy Med Name: Omeprazole 40 MG Oral Capsule Delayed Release] 90 capsule  3     Sig: TAKE 1 CAPSULE BY MOUTH DAILY       Gastrointestional Medication Protocol Passed - 5/16/2025 10:24 AM        Passed - In person appointment or virtual visit in the past 12 mos or appointment in next 3 mos     Recent Outpatient Visits              1 month ago Grief reaction    [facility], [facility] Brielle Marshall MD    Office Visit    1 year ago Gout, unspecified cause, unspecified chronicity, unspecified site    [facility]    [facility]    1 year ago LVH (left ventricular hypertrophy) due to hypertensive disease, without heart failure    [facility] Atul Roth DO    Office Visit    1 year ago Acute cough    [facility], Virtual Visit Marina Espinoza PA-C    Telemedicine    1 year ago Polyarthritis    [facility]    [facility]          Future Appointments         Provider Department Appt Notes    In 3 weeks Atul Roth DO [facility] 1/16/2024 LAST PX annual Medicare wellness exam                    Passed - Medication is active on med list

## 2025-06-03 ENCOUNTER — LAB ENCOUNTER (OUTPATIENT)
Dept: LAB | Age: 75
End: 2025-06-03
Attending: INTERNAL MEDICINE
Payer: MEDICARE

## 2025-06-03 DIAGNOSIS — E78.00 HYPERCHOLESTEROLEMIA: Primary | ICD-10-CM

## 2025-06-03 LAB
ALBUMIN SERPL-MCNC: 4.5 G/DL (ref 3.2–4.8)
ALBUMIN/GLOB SERPL: 2 {RATIO} (ref 1–2)
ALP LIVER SERPL-CCNC: 70 U/L (ref 45–117)
ALT SERPL-CCNC: 13 U/L (ref 10–49)
ANION GAP SERPL CALC-SCNC: 9 MMOL/L (ref 0–18)
AST SERPL-CCNC: 22 U/L (ref ?–34)
BILIRUB SERPL-MCNC: 1.1 MG/DL (ref 0.2–1.1)
BUN BLD-MCNC: 22 MG/DL (ref 9–23)
BUN/CREAT SERPL: 16.5 (ref 10–20)
CALCIUM BLD-MCNC: 9.7 MG/DL (ref 8.7–10.4)
CHLORIDE SERPL-SCNC: 102 MMOL/L (ref 98–112)
CHOLEST SERPL-MCNC: 158 MG/DL (ref ?–200)
CO2 SERPL-SCNC: 32 MMOL/L (ref 21–32)
CREAT BLD-MCNC: 1.33 MG/DL (ref 0.7–1.3)
EGFRCR SERPLBLD CKD-EPI 2021: 55 ML/MIN/1.73M2 (ref 60–?)
FASTING PATIENT LIPID ANSWER: YES
FASTING STATUS PATIENT QL REPORTED: YES
GLOBULIN PLAS-MCNC: 2.2 G/DL (ref 2–3.5)
GLUCOSE BLD-MCNC: 109 MG/DL (ref 70–99)
HDLC SERPL-MCNC: 48 MG/DL (ref 40–59)
LDLC SERPL CALC-MCNC: 88 MG/DL (ref ?–100)
NONHDLC SERPL-MCNC: 110 MG/DL (ref ?–130)
OSMOLALITY SERPL CALC.SUM OF ELEC: 300 MOSM/KG (ref 275–295)
POTASSIUM SERPL-SCNC: 3.6 MMOL/L (ref 3.5–5.1)
PROT SERPL-MCNC: 6.7 G/DL (ref 5.7–8.2)
SODIUM SERPL-SCNC: 143 MMOL/L (ref 136–145)
TRIGL SERPL-MCNC: 122 MG/DL (ref 30–149)
VLDLC SERPL CALC-MCNC: 20 MG/DL (ref 0–30)

## 2025-06-03 PROCEDURE — 36415 COLL VENOUS BLD VENIPUNCTURE: CPT

## 2025-06-03 PROCEDURE — 80061 LIPID PANEL: CPT

## 2025-06-03 PROCEDURE — 80053 COMPREHEN METABOLIC PANEL: CPT

## 2025-06-11 ENCOUNTER — OFFICE VISIT (OUTPATIENT)
Dept: FAMILY MEDICINE CLINIC | Facility: CLINIC | Age: 75
End: 2025-06-11
Payer: MEDICARE

## 2025-06-11 VITALS
DIASTOLIC BLOOD PRESSURE: 78 MMHG | WEIGHT: 200.38 LBS | TEMPERATURE: 98 F | SYSTOLIC BLOOD PRESSURE: 130 MMHG | BODY MASS INDEX: 32 KG/M2 | OXYGEN SATURATION: 98 %

## 2025-06-11 DIAGNOSIS — R73.03 PREDIABETES: ICD-10-CM

## 2025-06-11 DIAGNOSIS — Z12.5 ENCOUNTER FOR PROSTATE CANCER SCREENING: ICD-10-CM

## 2025-06-11 DIAGNOSIS — F43.20 GRIEF REACTION: ICD-10-CM

## 2025-06-11 DIAGNOSIS — I11.9 LVH (LEFT VENTRICULAR HYPERTROPHY) DUE TO HYPERTENSIVE DISEASE, WITHOUT HEART FAILURE: ICD-10-CM

## 2025-06-11 DIAGNOSIS — R94.4 DECREASED GFR: ICD-10-CM

## 2025-06-11 DIAGNOSIS — F32.A DEPRESSION, UNSPECIFIED DEPRESSION TYPE: ICD-10-CM

## 2025-06-11 DIAGNOSIS — M54.16 LUMBAR RADICULOPATHY: ICD-10-CM

## 2025-06-11 DIAGNOSIS — Z00.00 MEDICARE ANNUAL WELLNESS VISIT, SUBSEQUENT: Primary | ICD-10-CM

## 2025-06-11 DIAGNOSIS — I42.9 CARDIOMYOPATHY, UNSPECIFIED TYPE (HCC): ICD-10-CM

## 2025-06-11 DIAGNOSIS — M48.02 FORAMINAL STENOSIS OF CERVICAL REGION: ICD-10-CM

## 2025-06-11 DIAGNOSIS — I10 ESSENTIAL HYPERTENSION: ICD-10-CM

## 2025-06-11 RX ORDER — SERTRALINE HYDROCHLORIDE 25 MG/1
25 TABLET, FILM COATED ORAL DAILY
Qty: 90 TABLET | Refills: 0 | Status: SHIPPED | OUTPATIENT
Start: 2025-06-11 | End: 2025-09-09

## 2025-06-11 NOTE — PROGRESS NOTES
Subjective:     Patient ID: Micheal Munoz is a 75 year old male.    This patient is a well-established hypertensive  male here for Medicare annual visit which will also satisfy the requirements for a complete preventive care physical and for status update on any confirmed chronic medical illnesses and follow up on any previous labs or procedures that were suggestive or in need of further work up. Colonoscopy is current. Bowel and bladder functions are intact.    Patient is still in acute bereavement phase as he lost his partner.    Patient currently denies headaches, chest pain, dizziness, shortness of breath, acute visual changes, and/or exertional fatigue.    Patient declines on shingles and Prevnar vaccine on today.        History/Other:   Review of Systems  Current Medications[1]  Allergies:Allergies[2]    Past Medical History[3]   Past Surgical History[4]   Family History[5]   Social History: Short Social Hx on File[6]     Micheal Munoz's SCREENING SCHEDULE   Tests on this list are recommended by your physician but may not be covered, or covered at this frequency, by your insurer. Please check with your insurance carrier before scheduling to verify coverage.    PREVENTATIVE SERVICES  INDICATIONS AND SCHEDULE Internal Lab or Procedure External Lab or Procedure   Diabetes Screening      HbgA1C   Annually HgbA1C (%)   Date Value   09/19/2024 6.2 (H)         No data to display                Fasting Blood Sugar (FSB) Annually Glucose (mg/dL)   Date Value   06/03/2025 109 (H)       Cardiovascular Disease Screening     LDL Annually LDL Cholesterol (mg/dL)   Date Value   06/03/2025 88        EKG One Time      Colorectal Cancer Screening      Colonoscopy Screen every 10 years No recommendations at this time Update Health Maintenance if applicable    Flex Sigmoidoscopy Screen every 5 years No results found for this or any previous visit.      No data to display                 Fecal Occult Blood Annually  No results found for: \"FOB\", \"OCCULTSTOOL\"      No data to display                Glaucoma Screening      Ophthalmology Visit Annually      Prostate Cancer Screening      PSA  Annually There are no preventive care reminders to display for this patient.  Update Health Maintenance if applicable   Immunizations      Influenza No orders found for this or any previous visit. Update Immunization Activity if applicable    Pneumococcal No orders found for this or any previous visit. Update Immunization Activity if applicable    Hepatitis B No orders found for this or any previous visit. Update Immunization Activity if applicable    Tetanus Orders placed or performed in visit on 11/15/19    TETANUS, DIPHTHERIA TOXOIDS AND ACELLULAR PERTUSIS VACCINE (TDAP), >7 YEARS, IM USE   Orders placed or performed in visit on 09/08/16    TETANUS, DIPHTHERIA TOXOIDS AND ACELLULAR PERTUSIS VACCINE (TDAP), >7 YEARS, IM USE    Update Immunization Activity if applicable    Zoster (Not covered by Medicare Part B) No orders found for this or any previous visit. Update Immunization Activity if applicable     SPECIFIC DISEASE MONITORING Internal Lab or Procedure External Lab or Procedure   Annual Monitoring of Persistent     Medications (ACE/ARB, digoxin, diuretics)    Potassium  Annually Potassium (mmol/L)   Date Value   06/03/2025 3.6     POTASSIUM (P) (mmol/L)   Date Value   08/09/2016 3.2 (L)         No data to display                Creatinine  Annually Creatinine (mg/dL)   Date Value   06/03/2025 1.33 (H)         No data to display                Digoxin Serum Conc  Annually No results found for: \"DIGOXIN\"      No data to display                Diabetes      HgbA1C  Annually HgbA1C (%)   Date Value   09/19/2024 6.2 (H)         No data to display                Creat/alb ratio  Annually      LDL  Annually LDL Cholesterol (mg/dL)   Date Value   06/03/2025 88         No data to display                 Dilated Eye exam  Annually      No data to  display                   No data to display                COPD      Spirometry Testing Annually No results found for this or any previous visit.      No data to display                    General Health     In the past six months, have you lost more than 10 pounds without trying?: (Patient-Rptd) 1 - Yes    Has your appetite been poor?: (Patient-Rptd) Yes    Type of Diet: (Patient-Rptd) Other    How does the patient maintain a good energy level?: (Patient-Rptd) Daily Walks    How would you describe your daily physical activity?: (Patient-Rptd) Light    How would you describe your current health state?: (Patient-Rptd) Fair    How do you maintain positive mental well-being?: (Patient-Rptd) Social Interaction, Visiting Friends, Visiting Family         Have you had any immunizations at another office such as Influenza, Hepatitis B, Tetanus, or Pneumococcal?: (Patient-Rptd) Yes     Functional Ability     Bathing or Showering: (Patient-Rptd) Able without help    Toileting: (Patient-Rptd) Able without help    Dressing: (Patient-Rptd) Able without help    Eating: (Patient-Rptd) Able without help    Driving: (Patient-Rptd) Able without help    Preparing your meals: (Patient-Rptd) Able without help    Managing money/bills: (Patient-Rptd) Able without help    Taking medications as prescribed: (Patient-Rptd) Able without help    Are you able to afford your medications?: (Patient-Rptd) Yes    Hearing Problems?: (Patient-Rptd) No     Functional Status     Hearing Problems?: (Patient-Rptd) No    Vision Problems? : (Patient-Rptd) No    Difficulty walking?: (Patient-Rptd) No    Difficulty dressing or bathing?: (Patient-Rptd) No    Problems with daily activities? : (Patient-Rptd) No    Memory Problems?: (Patient-Rptd) Yes      Fall/Risk Assessment                                                              Depression Screening (PHQ-2/PHQ-9): Over the LAST 2 WEEKS                      Advance Directives     Do you have a healthcare  power of ?: (Patient-Rptd) Yes    Do you have a living will?: (Patient-Rptd) Yes     Hearing Assessment (Required for AWV/SWV)    Questionnaire    Visual Acuity                   Cognitive Assessment                                         Objective:   Vitals:    06/11/25 0935   BP: 130/78   Temp:        Physical Exam  Constitutional:       General: He is in acute distress.      Appearance: He is not ill-appearing.      Comments: Expectantly, emotionally challenged.   HENT:      Right Ear: Tympanic membrane normal.      Left Ear: Tympanic membrane normal.      Nose: Nose normal.      Mouth/Throat:      Mouth: Mucous membranes are moist.   Neck:      Thyroid: No thyromegaly.   Cardiovascular:      Rate and Rhythm: Normal rate and regular rhythm.      Heart sounds:      No gallop.   Pulmonary:      Effort: Pulmonary effort is normal.      Breath sounds: Normal breath sounds.   Abdominal:      General: Bowel sounds are normal. There is no distension.      Palpations: Abdomen is soft. There is no mass.      Comments: No pulsatile mass.  No abdominal bruit.   Neurological:      General: No focal deficit present.      Mental Status: He is alert and oriented to person, place, and time.   Psychiatric:         Attention and Perception: Attention normal.         Mood and Affect: Affect is tearful.         Speech: Speech normal.         Behavior: Behavior normal.         Cognition and Memory: Cognition normal.         Judgment: Judgment normal.      Comments: Acute bereavement phase apparent.         Assessment & Plan:   1. Medicare annual wellness visit, subsequent  Survey completed and the following labs have been ordered.  - CBC With Differential With Platelet; Future  - TSH W Reflex To Free T4; Future  - Urinalysis, Routine; Future    2. Grief reaction  May need formal counseling    3. Essential hypertension  Blood pressure measures to goal when measured manually by physician.  Compliance with blood pressure  medication emphasized and recommended.    4. LVH (left ventricular hypertrophy) due to hypertensive disease, without heart failure  Status quo.  Currently in sinus rhythm and compensated.    5. Prediabetes  Status update on today.    6. Cardiomyopathy, unspecified type (HCC)  Currently compensated.  Continue to comply with medications as prescribed and recommended by cardiologist.    7. Lumbar radiculopathy  Pain management as needed.    8. Decreased GFR  Status update with recent CMP showing a decreased GFR.  - Renal Function Panel [E]; Future    9. Foraminal stenosis of cervical region  Status unchanged.  Currently asymptomatic.    10. Encounter for prostate cancer screening  Ordered.  - PSA Total, Screen; Future    11. Depression, unspecified depression type  Treatment initiated on low-dose as follows.  - sertraline 25 MG Oral Tab; Take 1 tablet (25 mg total) by mouth daily.  Dispense: 90 tablet; Refill: 0      Orders Placed This Encounter   Procedures    CBC With Differential With Platelet    TSH W Reflex To Free T4    Urinalysis, Routine    PSA Total, Screen    Renal Function Panel [E]       Meds This Visit:  Requested Prescriptions     Signed Prescriptions Disp Refills    sertraline 25 MG Oral Tab 90 tablet 0     Sig: Take 1 tablet (25 mg total) by mouth daily.       Imaging & Referrals:  None     Patient Instructions   Medication reviewed and renewed where needed and appropriate.  Comply with medications.  Monitor blood pressures and record at home. Limit salt intake.  All adult screening ordered and done appropriate for patient's age and gender and risk factors and complaints.  Encouraged physical fitness and daily physical activity daily.  Recommend weight loss via daily exercising and consistent healthy dietary changes.  Start low dosage of Zoloft at 25 mg orally daily and we will adjust as deemed necessary and appropriate.     Return in about 1 year (around 6/11/2026), or if symptoms worsen or fail to  improve.         [1]   Current Outpatient Medications   Medication Sig Dispense Refill    sertraline 25 MG Oral Tab Take 1 tablet (25 mg total) by mouth daily. 90 tablet 0    atorvastatin 40 MG Oral Tab Take 1 tablet (40 mg total) by mouth nightly. 90 tablet 3    Omeprazole 40 MG Oral Capsule Delayed Release Take 1 capsule (40 mg total) by mouth daily. 90 capsule 3    diazePAM (VALIUM) 2 MG Oral Tab Take 1 tablet (2 mg total) by mouth every 6 (six) hours as needed for Anxiety. 30 tablet 0    AMLODIPINE 5 MG Oral Tab TAKE 1 TABLET BY MOUTH DAILY 30 tablet 11    ammonium lactate 12 % External Lotion Apply 1 Application topically 2 (two) times daily. (Patient not taking: Reported on 3/20/2025) 225 g 0    Valsartan-hydroCHLOROthiazide 320-25 MG Oral Tab Take 1 tablet by mouth daily. 90 tablet 3    metoprolol tartrate 100 MG Oral Tab Take 1 tablet (100 mg total) by mouth 2 (two) times daily. 180 tablet 3    methylPREDNISolone 4 MG Oral Tablet Therapy Pack Take as directed on package. 1 each 0    Calcium Carbonate-Vitamin D 600-200 MG-UNIT Oral Tab Take by mouth.      aspirin 81 MG Oral Tab EC Take 1 tablet (81 mg total) by mouth daily. 30 tablet 3    Multiple Vitamin (MULTI-VITAMINS) Oral Tab Take  by mouth. take 1 tablet by oral route  every day with food     [2]   Allergies  Allergen Reactions    Seasonal    [3]   Past Medical History:   Allergic rhinitis    Allergy    allergies    Anxiety state    Cataract    Colon polyps    colonic polyps.  colonoscopy with polypectomy    Coronary atherosclerosis    Esophageal reflux    Heart attack (HCC)    High blood pressure    High cholesterol    Male hypogonadism    Obesity, unspecified    Other and unspecified hyperlipidemia    Pneumonia due to organism    15-20 yrs ago    Sleep apnea    Unspecified essential hypertension    Visual impairment    glasses   [4]   Past Surgical History:  Procedure Laterality Date    Cabg      Carpal tunnel release Bilateral 2010    surgery, carpal  tunnel syndrome, successful    Colonoscopy      Colonoscopy      Hernia surgery      umbilical    Upper gi endoscopy,exam     [5]   Family History  Problem Relation Age of Onset    Hypertension Father     Lipids Father     Lipids Mother     Hypertension Mother     Diabetes Mother     Heart Disease Mother         arterial disease    Hypertension Maternal Grandmother     Stroke Maternal Grandmother         CVA (stroke)   [6]   Social History  Socioeconomic History    Marital status: Single   Tobacco Use    Smoking status: Never    Smokeless tobacco: Never   Vaping Use    Vaping status: Never Used   Substance and Sexual Activity    Alcohol use: Not Currently     Comment: Rarely    Drug use: Yes     Types: Cannabis     Comment: PM time   Other Topics Concern    Caffeine Concern Yes     Comment: coffee 1-2 cups daily    Exercise No   Social History Narrative    The patient does not use an assistive device..      The patient does live in a home with stairs.     Social Drivers of Health     Food Insecurity: No Food Insecurity (3/20/2025)    NCSS - Food Insecurity     Worried About Running Out of Food in the Last Year: No     Ran Out of Food in the Last Year: No   Transportation Needs: No Transportation Needs (3/20/2025)    NCSS - Transportation     Lack of Transportation: No   Housing Stability: Not At Risk (3/20/2025)    NCSS - Housing/Utilities     Has Housing: Yes     Worried About Losing Housing: No     Unable to Get Utilities: No

## 2025-06-11 NOTE — PATIENT INSTRUCTIONS
Medication reviewed and renewed where needed and appropriate.  Comply with medications.  Monitor blood pressures and record at home. Limit salt intake.  All adult screening ordered and done appropriate for patient's age and gender and risk factors and complaints.  Encouraged physical fitness and daily physical activity daily.  Recommend weight loss via daily exercising and consistent healthy dietary changes.  Start low dosage of Zoloft at 25 mg orally daily and we will adjust as deemed necessary and appropriate.

## 2025-06-18 ENCOUNTER — PATIENT MESSAGE (OUTPATIENT)
Dept: FAMILY MEDICINE CLINIC | Facility: CLINIC | Age: 75
End: 2025-06-18

## 2025-06-18 NOTE — TELEPHONE ENCOUNTER
DR Roth ===see message and advise, RN updated the medication record =discontinued sertraline.     Please respond directly to the patient if no additional staff support is required.      6/11/25 VISIT NOTE :  11. Depression, unspecified depression type  Treatment initiated on low-dose as follows.  - sertraline 25 MG Oral Tab; Take 1 tablet (25 mg total) by mouth daily.  Dispense: 90 tablet; Refill: 0

## 2025-06-24 ENCOUNTER — LAB ENCOUNTER (OUTPATIENT)
Dept: LAB | Age: 75
End: 2025-06-24
Attending: FAMILY MEDICINE
Payer: MEDICARE

## 2025-06-24 DIAGNOSIS — F43.20 GRIEF REACTION: ICD-10-CM

## 2025-06-24 DIAGNOSIS — R94.4 DECREASED GFR: ICD-10-CM

## 2025-06-24 DIAGNOSIS — Z00.00 MEDICARE ANNUAL WELLNESS VISIT, SUBSEQUENT: ICD-10-CM

## 2025-06-24 DIAGNOSIS — Z12.5 ENCOUNTER FOR PROSTATE CANCER SCREENING: ICD-10-CM

## 2025-06-24 LAB
ALBUMIN SERPL-MCNC: 4.4 G/DL (ref 3.2–4.8)
ANION GAP SERPL CALC-SCNC: 7 MMOL/L (ref 0–18)
BASOPHILS # BLD AUTO: 0.04 X10(3) UL (ref 0–0.2)
BASOPHILS NFR BLD AUTO: 0.4 %
BILIRUB UR QL: NEGATIVE
BUN BLD-MCNC: 23 MG/DL (ref 9–23)
BUN/CREAT SERPL: 20.2 (ref 10–20)
CALCIUM BLD-MCNC: 9.6 MG/DL (ref 8.7–10.4)
CHLORIDE SERPL-SCNC: 104 MMOL/L (ref 98–112)
CLARITY UR: CLEAR
CO2 SERPL-SCNC: 30 MMOL/L (ref 21–32)
COLOR UR: YELLOW
COMPLEXED PSA SERPL-MCNC: 1.47 NG/ML (ref ?–4)
CREAT BLD-MCNC: 1.14 MG/DL (ref 0.7–1.3)
DEPRECATED RDW RBC AUTO: 39.7 FL (ref 35.1–46.3)
EGFRCR SERPLBLD CKD-EPI 2021: 67 ML/MIN/1.73M2 (ref 60–?)
EOSINOPHIL # BLD AUTO: 0.14 X10(3) UL (ref 0–0.7)
EOSINOPHIL NFR BLD AUTO: 1.5 %
ERYTHROCYTE [DISTWIDTH] IN BLOOD BY AUTOMATED COUNT: 12.2 % (ref 11–15)
GLUCOSE BLD-MCNC: 107 MG/DL (ref 70–99)
GLUCOSE UR-MCNC: NEGATIVE MG/DL
HCT VFR BLD AUTO: 43.1 % (ref 39–53)
HGB BLD-MCNC: 14.4 G/DL (ref 13–17.5)
HGB UR QL STRIP.AUTO: NEGATIVE
IMM GRANULOCYTES # BLD AUTO: 0.03 X10(3) UL (ref 0–1)
IMM GRANULOCYTES NFR BLD: 0.3 %
KETONES UR-MCNC: NEGATIVE MG/DL
LEUKOCYTE ESTERASE UR QL STRIP.AUTO: NEGATIVE
LYMPHOCYTES # BLD AUTO: 2.47 X10(3) UL (ref 1–4)
LYMPHOCYTES NFR BLD AUTO: 25.7 %
MCH RBC QN AUTO: 29.4 PG (ref 26–34)
MCHC RBC AUTO-ENTMCNC: 33.4 G/DL (ref 31–37)
MCV RBC AUTO: 88 FL (ref 80–100)
MONOCYTES # BLD AUTO: 0.62 X10(3) UL (ref 0.1–1)
MONOCYTES NFR BLD AUTO: 6.4 %
NEUTROPHILS # BLD AUTO: 6.32 X10 (3) UL (ref 1.5–7.7)
NEUTROPHILS # BLD AUTO: 6.32 X10(3) UL (ref 1.5–7.7)
NEUTROPHILS NFR BLD AUTO: 65.7 %
NITRITE UR QL STRIP.AUTO: NEGATIVE
OSMOLALITY SERPL CALC.SUM OF ELEC: 296 MOSM/KG (ref 275–295)
PH UR: 5.5 [PH] (ref 5–8)
PHOSPHATE SERPL-MCNC: 2.7 MG/DL (ref 2.4–5.1)
PLATELET # BLD AUTO: 127 10(3)UL (ref 150–450)
POTASSIUM SERPL-SCNC: 3 MMOL/L (ref 3.5–5.1)
PROT UR-MCNC: NEGATIVE MG/DL
RBC # BLD AUTO: 4.9 X10(6)UL (ref 3.8–5.8)
SODIUM SERPL-SCNC: 141 MMOL/L (ref 136–145)
SP GR UR STRIP: 1.02 (ref 1–1.03)
TSI SER-ACNC: 0.76 UIU/ML (ref 0.55–4.78)
UROBILINOGEN UR STRIP-ACNC: 0.2
WBC # BLD AUTO: 9.6 X10(3) UL (ref 4–11)

## 2025-06-24 PROCEDURE — 85025 COMPLETE CBC W/AUTO DIFF WBC: CPT

## 2025-06-24 PROCEDURE — 81003 URINALYSIS AUTO W/O SCOPE: CPT

## 2025-06-24 PROCEDURE — 36415 COLL VENOUS BLD VENIPUNCTURE: CPT

## 2025-06-24 PROCEDURE — 84443 ASSAY THYROID STIM HORMONE: CPT

## 2025-06-24 PROCEDURE — 80069 RENAL FUNCTION PANEL: CPT

## 2025-06-26 DIAGNOSIS — D69.6 THROMBOCYTOPENIA: ICD-10-CM

## 2025-06-26 DIAGNOSIS — R73.9 ELEVATED RANDOM BLOOD GLUCOSE LEVEL: Primary | ICD-10-CM

## 2025-06-26 DIAGNOSIS — E87.6 HYPOKALEMIA: ICD-10-CM

## 2025-06-26 RX ORDER — DIAZEPAM 2 MG/1
2 TABLET ORAL EVERY 6 HOURS PRN
Qty: 30 TABLET | Refills: 0 | Status: SHIPPED | OUTPATIENT
Start: 2025-06-26

## 2025-06-26 NOTE — TELEPHONE ENCOUNTER
I just want to clarify.  Is it the patient's intention to call [facility]all Gr[facility] in order to be connected to a counselor?

## 2025-06-26 NOTE — TELEPHONE ENCOUNTER
Please review; protocol failed/No Protocol      Recent Fills: 03/29/2025, 05/04/2025    Last Rx Written: 05/04/2025    Last Office Visit: 06/11/2025

## 2025-06-27 ENCOUNTER — PATIENT MESSAGE (OUTPATIENT)
Dept: FAMILY MEDICINE CLINIC | Facility: CLINIC | Age: 75
End: 2025-06-27

## 2025-06-27 NOTE — TELEPHONE ENCOUNTER
DR Roth ==KISHORE Cueva,  provided [facility] number just In case he change his mind .Per the patient's Off[employer] message===see below;  \"I think for the time being I will abstain from seeing the behavioral counselor at this time. \"

## 2025-07-18 DIAGNOSIS — I10 ESSENTIAL HYPERTENSION: ICD-10-CM

## 2025-07-21 DIAGNOSIS — I10 ESSENTIAL HYPERTENSION: ICD-10-CM

## 2025-07-22 ENCOUNTER — LAB ENCOUNTER (OUTPATIENT)
Dept: LAB | Age: 75
End: 2025-07-22
Attending: FAMILY MEDICINE
Payer: MEDICARE

## 2025-07-22 DIAGNOSIS — D69.6 THROMBOCYTOPENIA: ICD-10-CM

## 2025-07-22 DIAGNOSIS — I10 ESSENTIAL HYPERTENSION: ICD-10-CM

## 2025-07-22 DIAGNOSIS — R73.9 ELEVATED RANDOM BLOOD GLUCOSE LEVEL: ICD-10-CM

## 2025-07-22 DIAGNOSIS — E87.6 HYPOKALEMIA: ICD-10-CM

## 2025-07-22 LAB
ANION GAP SERPL CALC-SCNC: 8 MMOL/L (ref 0–18)
BASOPHILS # BLD AUTO: 0.02 X10(3) UL (ref 0–0.2)
BASOPHILS NFR BLD AUTO: 0.2 %
BUN BLD-MCNC: 15 MG/DL (ref 9–23)
BUN/CREAT SERPL: 14.3 (ref 10–20)
CALCIUM BLD-MCNC: 9.5 MG/DL (ref 8.7–10.4)
CHLORIDE SERPL-SCNC: 100 MMOL/L (ref 98–112)
CO2 SERPL-SCNC: 32 MMOL/L (ref 21–32)
CREAT BLD-MCNC: 1.05 MG/DL (ref 0.7–1.3)
DEPRECATED RDW RBC AUTO: 37.2 FL (ref 35.1–46.3)
EGFRCR SERPLBLD CKD-EPI 2021: 74 ML/MIN/1.73M2 (ref 60–?)
EOSINOPHIL # BLD AUTO: 0.1 X10(3) UL (ref 0–0.7)
EOSINOPHIL NFR BLD AUTO: 1.1 %
ERYTHROCYTE [DISTWIDTH] IN BLOOD BY AUTOMATED COUNT: 12.2 % (ref 11–15)
EST. AVERAGE GLUCOSE BLD GHB EST-MCNC: 123 MG/DL (ref 68–126)
FASTING STATUS PATIENT QL REPORTED: NO
GLUCOSE BLD-MCNC: 110 MG/DL (ref 70–99)
HBA1C MFR BLD: 5.9 % (ref ?–5.7)
HCT VFR BLD AUTO: 42.9 % (ref 39–53)
HGB BLD-MCNC: 14.7 G/DL (ref 13–17.5)
IMM GRANULOCYTES # BLD AUTO: 0.02 X10(3) UL (ref 0–1)
IMM GRANULOCYTES NFR BLD: 0.2 %
LYMPHOCYTES # BLD AUTO: 2.3 X10(3) UL (ref 1–4)
LYMPHOCYTES NFR BLD AUTO: 25.7 %
MCH RBC QN AUTO: 28.9 PG (ref 26–34)
MCHC RBC AUTO-ENTMCNC: 34.3 G/DL (ref 31–37)
MCV RBC AUTO: 84.3 FL (ref 80–100)
MONOCYTES # BLD AUTO: 0.59 X10(3) UL (ref 0.1–1)
MONOCYTES NFR BLD AUTO: 6.6 %
NEUTROPHILS # BLD AUTO: 5.91 X10 (3) UL (ref 1.5–7.7)
NEUTROPHILS # BLD AUTO: 5.91 X10(3) UL (ref 1.5–7.7)
NEUTROPHILS NFR BLD AUTO: 66.2 %
OSMOLALITY SERPL CALC.SUM OF ELEC: 291 MOSM/KG (ref 275–295)
PLATELET # BLD AUTO: 194 10(3)UL (ref 150–450)
POTASSIUM SERPL-SCNC: 3.2 MMOL/L (ref 3.5–5.1)
RBC # BLD AUTO: 5.09 X10(6)UL (ref 3.8–5.8)
SODIUM SERPL-SCNC: 140 MMOL/L (ref 136–145)
WBC # BLD AUTO: 8.9 X10(3) UL (ref 4–11)

## 2025-07-22 PROCEDURE — 83036 HEMOGLOBIN GLYCOSYLATED A1C: CPT

## 2025-07-22 PROCEDURE — 80048 BASIC METABOLIC PNL TOTAL CA: CPT

## 2025-07-22 PROCEDURE — 36415 COLL VENOUS BLD VENIPUNCTURE: CPT

## 2025-07-22 PROCEDURE — 85025 COMPLETE CBC W/AUTO DIFF WBC: CPT

## 2025-07-22 RX ORDER — METOPROLOL TARTRATE 100 MG/1
100 TABLET ORAL 2 TIMES DAILY
Qty: 180 TABLET | Refills: 3 | Status: CANCELLED | OUTPATIENT
Start: 2025-07-22

## 2025-07-22 RX ORDER — METOPROLOL TARTRATE 100 MG/1
100 TABLET ORAL 2 TIMES DAILY
Qty: 180 TABLET | Refills: 3 | Status: SHIPPED | OUTPATIENT
Start: 2025-07-22

## 2025-07-23 RX ORDER — VALSARTAN AND HYDROCHLOROTHIAZIDE 320; 25 MG/1; MG/1
1 TABLET, FILM COATED ORAL DAILY
Qty: 90 TABLET | Refills: 3 | Status: SHIPPED | OUTPATIENT
Start: 2025-07-23

## 2025-07-24 NOTE — TELEPHONE ENCOUNTER
Refill passed per [facility] protocol.      Requested Prescriptions   Pending Prescriptions Disp Refills    VALSARTAN-HYDROCHLOROTHIAZIDE 320-25 MG Oral Tab [Pharmacy Med Name: Valsartan-hydroCHLOROthiazide 320-25 MG Oral Tablet] 90 tablet 3     Sig: Take 1 tablet by mouth daily.       Hypertension Medications Protocol Passed - 7/23/2025  8:18 PM        Passed - CMP or BMP in past 12 months        Passed - Last BP reading less than 140/90     BP Readings from Last 1 Encounters:   06/11/25 130/78               Passed - In person appointment or virtual visit in the past 12 mos or appointment in next 3 mos     Recent Outpatient Visits              1 month ago Medicare annual wellness visit, subsequent    [facility] Atul Roth DO    Office Visit    4 months ago Grief reaction    [facility] Brielle Marshall MD    Office Visit    1 year ago Gout, unspecified cause, unspecified chronicity, unspecified site    [facility] Amanda Snyder MD    Office Visit    1 year ago LVH (left ventricular hypertrophy) due to hypertensive disease, without heart failure    [facility] tAul Roth,     Office Visit    1 year ago Acute cough    [facility], Virtual Visit Marina Espinoza PA-C    Telemedicine                      Passed - EGFRCR or GFRNAA > 50     GFR Evaluation  EGFRCR: 74 , resulted on 7/22/2025          Passed - Medication is active on med list                  Recent Outpatient Visits              1 month ago Medicare annual wellness visit, subsequent    [facility] Atul Roth DO    Office Visit    4 months ago Grief reaction    [facility] Brielle Marshall MD    Office Visit    1 year ago Gout, unspecified cause,  unspecified chronicity, unspecified site    [facility], Amanda Weller MD    Office Visit    1 year ago LVH (left ventricular hypertrophy) due to hypertensive disease, without heart failure    [facility] Atul Roth DO    Office Visit    1 year ago Acute cough    [facility] Marina Espinoza PA-C    Telemedicine

## (undated) DEVICE — GAMMEX® PI HYBRID SIZE 8, STERILE POWDER-FREE SURGICAL GLOVE, POLYISOPRENE AND NEOPRENE BLEND: Brand: GAMMEX

## (undated) DEVICE — MINI-BLADE®: Brand: BEAVER®

## (undated) DEVICE — PACK ASSRY CUSTOM TUBING

## (undated) DEVICE — SOL  .9 1000ML BTL

## (undated) DEVICE — CS5/5+ FASTPACK, 225ML 150U RES: Brand: HAEMONETICS CELL SAVER 5/5+ SYSTEMS

## (undated) DEVICE — [HIGH FLOW INSUFFLATOR,  DO NOT USE IF PACKAGE IS DAMAGED,  KEEP DRY,  KEEP AWAY FROM SUNLIGHT,  PROTECT FROM HEAT AND RADIOACTIVE SOURCES.]: Brand: PNEUMOSURE

## (undated) DEVICE — FORESIGHT LARGE SENSOR: Brand: FORESIGHT

## (undated) DEVICE — RETROGRADE CARDIOPLEGIA CATHETER: Brand: EDWARDS LIFESCIENCES RETROGRADE CARDIOPLEGIA CATHETER

## (undated) DEVICE — SUTURE PROLENE 6-0 C-1

## (undated) DEVICE — INSERT SUTURE OPEN HEART

## (undated) DEVICE — PACK CUSTOM TUBING

## (undated) DEVICE — CONNECTOR PRFSN QCK PRM .25IN

## (undated) DEVICE — DRAPE SLUSH/WARMER W/DISC

## (undated) DEVICE — SUCTION CANISTER, 3000CC,SAFELINER: Brand: DEROYAL

## (undated) DEVICE — SOL  .9 1000ML BAG

## (undated) DEVICE — ABSORBABLE HEMOSTAT (OXIDIZED REGENERATED CELLULOSE, U.S.P.): Brand: SURGICEL

## (undated) DEVICE — TRAY ENDOVEIN KTV16 HARVESTING

## (undated) DEVICE — SUTURE SURGICAL STEEL #7

## (undated) DEVICE — TRAY CATH BDX 16FR 350ML FL

## (undated) DEVICE — SUTURE PROLENE 4-0 RB-1

## (undated) DEVICE — SUTURE PROLENE 7-0 8701H

## (undated) DEVICE — 32 FR RIGHT ANGLE – SOFT PVC CATHETER: Brand: PVC THORACIC CATHETERS

## (undated) DEVICE — STABILIZER SRG UNV AST CABG

## (undated) DEVICE — SYSTEM ZDRIVE NLTX DISPOSABLE

## (undated) DEVICE — HEART A: Brand: MEDLINE INDUSTRIES, INC.

## (undated) DEVICE — SUTURE VICRYL 1-0 J977H

## (undated) DEVICE — SUTURE SILK 2-0 SH

## (undated) DEVICE — SUTURE NON ABS 3-0 30INL MONO

## (undated) DEVICE — STERILE (10.2 X 147CM) TELESCOPICALLY-FOLDED COVER: Brand: CIV-FLEX™ TRANSDUCER COVER

## (undated) DEVICE — 32 FR STRAIGHT – SOFT PVC CATHETER: Brand: PVC THORACIC CATHETERS

## (undated) DEVICE — SUTURE PDS II 2-0 CT-1

## (undated) DEVICE — SUTURE SILK 2-0 SA85H

## (undated) DEVICE — UNDYED BRAIDED (POLYGLACTIN 910), SYNTHETIC ABSORBABLE SUTURE: Brand: COATED VICRYL

## (undated) DEVICE — EZ GLIDE AORTIC CANNULA: Brand: EDWARDS LIFESCIENCES EZ GLIDE AORTIC CANNULA

## (undated) DEVICE — 12 ML SYRINGE LUER-LOCK TIP: Brand: MONOJECT

## (undated) DEVICE — CATH THORACIC 32F 5 EYLT

## (undated) DEVICE — ELEC DEFIB QUIK COMBO

## (undated) DEVICE — 12 FOOT DISPOSABLE EXTENSION CABLE WITH SAFE CONNECT / SCREW-DOWN

## (undated) DEVICE — STERILE TETRA-FLEX CF, ELASTIC BANDAGE LATEX FREE 6IN X5.5 YD: Brand: TETRA-FLEX™CF

## (undated) DEVICE — PAD PLMM SLVR 12.5X4IN SLVR

## (undated) DEVICE — HEMOCLIP HORIZON SM MULTI

## (undated) DEVICE — LEAD BIPOLAR TEMP 6495XF53

## (undated) DEVICE — DEVICE BLWR/MSTR ACCUMIST ATCH

## (undated) DEVICE — STERILE TETRA-FLEX CF, ELASTIC BANDAGE, 4" X 5.5YD: Brand: TETRA-FLEX™CF

## (undated) DEVICE — CANNULA PRFSN 15IN 32/40FR .5

## (undated) DEVICE — SUTURE WIRE DOUBLE STERNOTOMY

## (undated) DEVICE — SUTURE ETHIBOND 0 CT-1

## (undated) DEVICE — CARTRIDGE HC HMS+ CRTDG SYR

## (undated) DEVICE — HEART DRAPE AND SUPPLY: Brand: MEDLINE INDUSTRIES, INC.

## (undated) DEVICE — SUTURE SILK 1-0 SA87G

## (undated) DEVICE — ADAPTER CRDPLG ANTGRD RTRGD 3W

## (undated) DEVICE — SUTURE MONOCRYL 3-0 Y936H

## (undated) DEVICE — PUNCH AORT 4MM 8IN ROT BLT TIP

## (undated) DEVICE — SUTURE PROLENE 5-0 8325H

## (undated) DEVICE — FLOSEAL HEMOSTATIC MATRIX, 5ML: Brand: FLOSEAL HEMOSTATIC MATRIX

## (undated) DEVICE — SUTURE VICRYL 2-0 CT-1

## (undated) DEVICE — SUTURE SILK 4-0 SA63H

## (undated) DEVICE — CATH SECURING DEVICE STATLOCK

## (undated) DEVICE — ALARM PT PTCH LVL

## (undated) DEVICE — 3M™ BAIR HUGGER® UNDERBODY BLANKET, FULL ACCESS, 10 PER CASE 63500: Brand: BAIR HUGGER™

## (undated) NOTE — Clinical Note
Pt was contacted for TCM. Pt reports he is doing well. Pt feels the redness and swelling has improved and he denies SOB. Pt has started the antibiotic and is aware to complete it.  Please complete med rec at [facility] appt as pt did not review all of his medicati

## (undated) NOTE — Clinical Note
FYI, TCM call made, see notes.  NCM confirmed patient had a HFU scheduled on 3/1/2021, NCM rescheduled to a TCM HFU on  2/15/2021 at 1:20 pm. (within TCM timeframe)

## (undated) NOTE — LETTER
[address] [address]  [phone]    1/28/2021        Argentina Lindsey DO  [address] [place] [address]           Patient: Eze Crowell   YOB: 1950

## (undated) NOTE — LETTER
[facility]  Administration of Anesthesia  1. Audra Fernandez, or _________________________________ acting on his behalf, (Patient) (Dependent/Representative) request to receive anesthesia for my pending procedure/operation/treatment. 6. OBSTETRIC PATIENTS: Specific risks/consequences of spinal/epidural anesthesia may include itching, low blood pressure, difficulty urinating, slowing of the baby's heart rate and headache.  Rare risks include infections, high spinal block, spinal bleeding ___________________________________________________           _____________________________________________________  Date/Time                                                                                               Responsible person in case of minor

## (undated) NOTE — LETTER
May 9, 2018    Jolene Sainz  [address]    Dear Tariq Zuniga: It was a pleasure speaking with you over the phone recently.  To follow up, I wanted to send you some contact information to utilize when you have a question and or

## (undated) NOTE — LETTER
David Charles [address], [facility], [place]  [address]  INFORMED CONSENT FOR TRANSFUSION OF BLOOD OR BLOOD PRODUCTS  My physician has informed me of the nature, purpose, benefits and risks of transfusion for blood and blood components that __________________________________________          ______________________________________________  (Signature of Patient)                                                            (Responsible party in case of Minor,

## (undated) NOTE — MR AVS SNAPSHOT
After Visit Summary   2/26/2021    Tyra Jaquez    MRN: [id number]           Visit Information     Date & Time  2/26/2021 12:45 PM Provider  [facility]  Phone  [phone]      Your OMEPRAZOLE 40 MG Oral Capsule Delayed Release TAKE 1 CAPSULE BY MOUTH  DAILY    VALSARTAN-HYDROCHLOROTHIAZIDE 320-25 MG Oral Tab TAKE 1 TABLET BY MOUTH  DAILY    diazepam 5 MG Oral Tab TAKE 1/2 TABLET AS NEEDED    Multiple Vitamin (MULTI-VITAMINS) Oral Ta If you receive a survey from [facility], please take a few minutes to complete it and provide feedback. We strive to deliver the best patient experience and are looking for ways to make improvements. Your feedback will help us do so.  For more information EMERGENCY ROOM Life-threatening emergencies needing immediate intervention at a hospital emergency room.  Average cost  $2,300*   *Cost varies based on your insurance coverage  For more information about hours, locations or appointment options available at

## (undated) NOTE — LETTER
[address] [place] [address]      Authorization for Surgical Operation and Procedure     Date:___________                                                                                                         Time:_______ 4.   Should the need arise during my operation or immediate post-operative period, I also consent to the administration of blood and/or blood products.   Further, I understand that despite careful testing and screening of blood or blood products by nelly 8.   I recognize that in the event my procedure results in extended X-Ray/fluoroscopy time, I may develop a skin reaction. 9.  If I have a Do Not Attempt Resuscitation (DNAR) order in place, that status will be suspended while in the operating room, proc STATEMENT OF PHYSICIAN My signature below affirms that prior to the time of the procedure; I have explained to the patient and/or his/her legal representative, the risks and benefits involved in the proposed treatment and any reasonable alternative to the

## (undated) NOTE — MR AVS SNAPSHOT
[facility]  [address], [address]  [phone]               Thank you for choosing us for your health care visit with Bere Lezama MD.  We are glad to serve you and happy to provide you with this summar schedule your appointment. Failure to obtain required authorization numbers can create reimbursement difficulties for you.     Assoc Dx:  History of colon polyps [Z86.010], Colon cancer screening [Z12.11]          Reason for Today's Visit     Physical     M Metoprolol Succinate ER 50 MG Tb24   Take 1 tablet (50 mg total) by mouth once daily. Commonly known as: Toprol XL           MULTI-VITAMINS Tabs   Take  by mouth.  take 1 tablet by oral route  every day with food           Omeprazole 40 MG Cpdr   Take 1 - Valsartan-Hydrochlorothiazide 320-25 MG Tabs      You can get these medications from any pharmacy     Bring a paper prescription for each of these medications    - HYDROcodone-acetaminophen 5-325 MG Tabs            Lisahart     Sign up for Je, your s are inactive.      HOW TO GET STARTED: HOW TO STAY MOTIVATED:   Start activities slowly and build up over time Do what you like   Get your heart pumping – brisk walking, biking, swimming Even 10 minute increments are effective and add up over the week   2 ½